# Patient Record
Sex: MALE | Race: WHITE | NOT HISPANIC OR LATINO | Employment: OTHER | URBAN - METROPOLITAN AREA
[De-identification: names, ages, dates, MRNs, and addresses within clinical notes are randomized per-mention and may not be internally consistent; named-entity substitution may affect disease eponyms.]

---

## 2017-01-16 ENCOUNTER — HOSPITAL ENCOUNTER (OUTPATIENT)
Dept: RADIOLOGY | Facility: HOSPITAL | Age: 62
Discharge: HOME | End: 2017-01-16
Attending: INTERNAL MEDICINE
Payer: COMMERCIAL

## 2017-01-16 ENCOUNTER — TELEPHONE (OUTPATIENT)
Dept: TRANSPLANT | Facility: CLINIC | Age: 62
End: 2017-01-16

## 2017-01-16 ENCOUNTER — OFFICE VISIT (OUTPATIENT)
Dept: TRANSPLANT | Facility: CLINIC | Age: 62
End: 2017-01-16
Payer: COMMERCIAL

## 2017-01-16 VITALS
HEIGHT: 67 IN | DIASTOLIC BLOOD PRESSURE: 73 MMHG | WEIGHT: 134.5 LBS | TEMPERATURE: 98 F | OXYGEN SATURATION: 100 % | HEART RATE: 52 BPM | BODY MASS INDEX: 21.11 KG/M2 | RESPIRATION RATE: 16 BRPM | SYSTOLIC BLOOD PRESSURE: 118 MMHG

## 2017-01-16 DIAGNOSIS — Z29.89 PROPHYLACTIC IMMUNOTHERAPY: Chronic | ICD-10-CM

## 2017-01-16 DIAGNOSIS — Z00.5 HEPATITIS B VIRUS INFECTION IN CADAVERIC DONOR: ICD-10-CM

## 2017-01-16 DIAGNOSIS — Z86.19 H/O HEPATITIS: ICD-10-CM

## 2017-01-16 DIAGNOSIS — B19.10 HEPATITIS B VIRUS INFECTION IN CADAVERIC DONOR: ICD-10-CM

## 2017-01-16 DIAGNOSIS — Z94.4 LIVER REPLACED BY TRANSPLANT: ICD-10-CM

## 2017-01-16 DIAGNOSIS — Z94.4 LIVER REPLACED BY TRANSPLANT: Primary | ICD-10-CM

## 2017-01-16 DIAGNOSIS — E87.5 HYPERKALEMIA: Primary | ICD-10-CM

## 2017-01-16 PROCEDURE — 71260 CT THORAX DX C+: CPT | Mod: 26,,, | Performed by: RADIOLOGY

## 2017-01-16 PROCEDURE — 99214 OFFICE O/P EST MOD 30 MIN: CPT | Mod: S$GLB,,, | Performed by: NURSE PRACTITIONER

## 2017-01-16 PROCEDURE — 99999 PR PBB SHADOW E&M-EST. PATIENT-LVL III: CPT | Mod: PBBFAC,,, | Performed by: NURSE PRACTITIONER

## 2017-01-16 PROCEDURE — 74177 CT ABD & PELVIS W/CONTRAST: CPT | Mod: 26,,, | Performed by: RADIOLOGY

## 2017-01-16 PROCEDURE — 97802 MEDICAL NUTRITION INDIV IN: CPT | Mod: S$GLB,,, | Performed by: DIETITIAN, REGISTERED

## 2017-01-16 PROCEDURE — 25500020 PHARM REV CODE 255: Performed by: INTERNAL MEDICINE

## 2017-01-16 PROCEDURE — 71260 CT THORAX DX C+: CPT | Mod: TC

## 2017-01-16 PROCEDURE — 74177 CT ABD & PELVIS W/CONTRAST: CPT | Mod: TC

## 2017-01-16 PROCEDURE — 1159F MED LIST DOCD IN RCRD: CPT | Mod: S$GLB,,, | Performed by: NURSE PRACTITIONER

## 2017-01-16 RX ADMIN — IOHEXOL 15 ML: 350 INJECTION, SOLUTION INTRAVENOUS at 11:01

## 2017-01-16 RX ADMIN — IOHEXOL 75 ML: 350 INJECTION, SOLUTION INTRAVENOUS at 01:01

## 2017-01-16 RX ADMIN — IOHEXOL 15 ML: 350 INJECTION, SOLUTION INTRAVENOUS at 10:01

## 2017-01-16 NOTE — TELEPHONE ENCOUNTER
Informed pt K=6.0 on today's labs with no visible hemolysis. Called in Kionex 30 gms po x 1 per Dr. Ball to [facility] pharmacy. Instructed to take dose today and will repeat potassium level tomorrow. Educated pt on low K diet. He verbalizes understanding.

## 2017-01-16 NOTE — PROGRESS NOTES
Transplant Hepatology  Liver Transplant Recipient Follow-up    Transplant Date: 2013  UNOS Native Liver Dx: Primary Liver Malignancy: Hepatoma (HCC) and Cirrhosis    Mario is here for follow up of his liver transplant.    ORGAN: LIVER  Whole or Partial: whole liver  Donor Type:  - brain death  CDC High Risk: no  Donor CMV Status: negative  Donor HCV Status: positive  Donor HBcAb: positive  Biliary Anastomosis: end to end  Arterial Anatomy: replaced left hepatic from left gastric  IVC reconstruction: end to end ivc  Portal vein status: patent    He has had the following complications since transplant: recurrent Hepatitis C. The noted complications is well controlled.    Subjective:     Interval History: Mario was last seen on 10/2015. Currently, he is doing well. Current complaints include none.  He is s/p 24 weeks of Harvoni w/ SVR 24 since at least 2016  He has good allograft function with normal LFTs, maintained on tacro and MMF  Has hx of skin ca, sees Derm regularly for skin checks  On Truvada for prophylaxis      Review of Systems   Constitutional: Negative for activity change, appetite change, chills, diaphoresis, fatigue, fever and unexpected weight change.   HENT: Negative for facial swelling.    Respiratory: Negative for cough, chest tightness and shortness of breath.    Cardiovascular: Negative for chest pain, palpitations and leg swelling.   Gastrointestinal: Negative for abdominal distention, abdominal pain, blood in stool, constipation, diarrhea, nausea and vomiting.   Musculoskeletal: Negative.  Negative for neck pain and neck stiffness.   Skin: Negative for color change, rash and wound.   Neurological: Negative for dizziness, tremors, weakness and light-headedness.   Hematological: Negative for adenopathy. Does not bruise/bleed easily.   Psychiatric/Behavioral: Negative for agitation and decreased concentration. The patient is not nervous/anxious.        Objective:     Physical  Exam   Constitutional: He is oriented to person, place, and time. He appears well-developed and well-nourished. No distress.   HENT:   Head: Normocephalic and atraumatic.   Eyes: Conjunctivae are normal. Pupils are equal, round, and reactive to light. No scleral icterus.   Neck: Normal range of motion. Neck supple.   Cardiovascular: Normal rate.    Pulmonary/Chest: Effort normal.   Abdominal: Soft. He exhibits no distension and no mass. There is no tenderness. There is no rebound and no guarding.   Musculoskeletal: Normal range of motion.   Neurological: He is alert and oriented to person, place, and time. No cranial nerve deficit. He exhibits normal muscle tone. Coordination normal.   No asterixis   Skin: Skin is warm and dry. No rash noted. He is not diaphoretic. No erythema.        Surgical incision healed well, no hernia appreciated   Psychiatric: He has a normal mood and affect. His behavior is normal. Judgment and thought content normal.     Lab Results   Component Value Date    BILITOT 0.8 01/16/2017    AST 22 01/16/2017    ALT 24 01/16/2017    ALKPHOS 70 01/16/2017    CREATININE 1.1 01/16/2017    ALBUMIN 3.9 01/16/2017     Lab Results   Component Value Date    WBC 4.59 01/16/2017    HGB 13.1 (L) 01/16/2017    HCT 37.2 (L) 01/16/2017    HCT 32 (L) 07/11/2013     (L) 01/16/2017     Lab Results   Component Value Date    TACROLIMUS 6.7 01/16/2017       Assessment/Plan:     1. Liver replaced by transplant    2. H/O hepatitis C    3. Prophylactic immunotherapy    4. Hepatitis B virus infection in cadaveric donor        S/P liver transplant due to HCV cirrhosis/HCC : Normal LFTs. Continue monitoring symptoms, labs and drug levels for drug-related toxicity and side effects  -continue with post transplant labs per protocol  -HCC screening per protocol, AFP normal, CT pending  IS: Chronic immunosuppressive medications for rejection prophylaxis at target.   no adjustment needed.   Maintenance:  -Instructed to  f/u with PCP for regular health maintenance care including cancer screenings and BMD  -Reviewed need to avoid sun exposure with use of sunblock, hats, long sleeves related to increased risk of skin cancers  -Recommend f/u with Dermatology for annual skin checks    RTC 1 year    SYEDA Navarro Patient Status  Functional Status: 100% - Normal, no complaints, no evidence of disease  Physical Capacity: No Limitations

## 2017-01-16 NOTE — LETTER
January 16, 2017        Neto Roblero  [address]  [facility] [address]  Phone: [phone]  Fax: [phone]             [facility]  [address]  [facility] [address]  Phone: [phone]   Patient: Mario Grier   MR Number: [id number]   YOB: 1955   Date of Visit: 1/16/2017       Dear Dr. Neto Roblero    Thank you for referring Mario Grier to me for evaluation. Attached you will find relevant portions of my assessment and plan of care.    If you have questions, please do not hesitate to call me. I look forward to following Mario Grier along with you.    Sincerely,    Ranjana Abel NP    Enclosure    If you would like to receive this communication electronically, please contact [email or web address] or [phone] to request [employer] access.    [employer] is a tool which provides read-only access to select patient information with whom you have a relationship. Its easy to use and provides real time access to review your patients record including encounter summaries, notes, results, and demographic information.    If you feel you have received this communication in error or would no longer like to receive these types of communications, please e-mail [email or web address]

## 2017-01-16 NOTE — PROGRESS NOTES
Met with pt today during post Ltx clinic.   Pt has Ltx 7/11/2013 h/o hep C,HCC,wegiht loss.  Pt present with caregiver today.   K 6.0 today. Pt not familiar with what the significance of elevated potassium level is.   He reports not eating many potatoes, but he is eating chocolate flavored cereal daily with banana. He also has chocolate flavored Slim Fast shakes.    Provided edu on limiting high potassium food intake.  Provided RD contact info.

## 2017-01-17 ENCOUNTER — TELEPHONE (OUTPATIENT)
Dept: TRANSPLANT | Facility: CLINIC | Age: 62
End: 2017-01-17

## 2017-01-17 ENCOUNTER — HOSPITAL ENCOUNTER (EMERGENCY)
Facility: HOSPITAL | Age: 62
Discharge: HOME | End: 2017-01-18
Attending: FAMILY MEDICINE
Payer: COMMERCIAL

## 2017-01-17 VITALS
DIASTOLIC BLOOD PRESSURE: 67 MMHG | BODY MASS INDEX: 20.88 KG/M2 | TEMPERATURE: 100 F | HEIGHT: 67 IN | RESPIRATION RATE: 20 BRPM | SYSTOLIC BLOOD PRESSURE: 134 MMHG | HEART RATE: 92 BPM | OXYGEN SATURATION: 100 % | WEIGHT: 133 LBS

## 2017-01-17 DIAGNOSIS — K52.9 ACUTE GASTROENTERITIS: Primary | ICD-10-CM

## 2017-01-17 LAB
ALBUMIN SERPL BCP-MCNC: 4.6 G/DL
ALP SERPL-CCNC: 75 U/L
ALT SERPL W/O P-5'-P-CCNC: 22 U/L
ANION GAP SERPL CALC-SCNC: 11 MMOL/L
AST SERPL-CCNC: 22 U/L
BASOPHILS # BLD AUTO: 0 K/UL
BASOPHILS NFR BLD: 0 %
BILIRUB SERPL-MCNC: 1.6 MG/DL
BUN SERPL-MCNC: 25 MG/DL
CALCIUM SERPL-MCNC: 9.9 MG/DL
CHLORIDE SERPL-SCNC: 106 MMOL/L
CO2 SERPL-SCNC: 24 MMOL/L
CREAT SERPL-MCNC: 1.1 MG/DL
DIFFERENTIAL METHOD: ABNORMAL
EOSINOPHIL # BLD AUTO: 0 K/UL
EOSINOPHIL NFR BLD: 0 %
ERYTHROCYTE [DISTWIDTH] IN BLOOD BY AUTOMATED COUNT: 12.1 %
EST. GFR  (AFRICAN AMERICAN): >60 ML/MIN/1.73 M^2
EST. GFR  (NON AFRICAN AMERICAN): >60 ML/MIN/1.73 M^2
GLUCOSE SERPL-MCNC: 126 MG/DL
HCT VFR BLD AUTO: 40.2 %
HGB BLD-MCNC: 14.8 G/DL
LIPASE SERPL-CCNC: 10 U/L
LYMPHOCYTES # BLD AUTO: 0.9 K/UL
LYMPHOCYTES NFR BLD: 6.8 %
MCH RBC QN AUTO: 34.5 PG
MCHC RBC AUTO-ENTMCNC: 36.8 %
MCV RBC AUTO: 94 FL
MONOCYTES # BLD AUTO: 0.5 K/UL
MONOCYTES NFR BLD: 3.9 %
NEUTROPHILS # BLD AUTO: 11.1 K/UL
NEUTROPHILS NFR BLD: 89.1 %
PLATELET # BLD AUTO: 136 K/UL
PLATELET BLD QL SMEAR: ABNORMAL
PMV BLD AUTO: 12 FL
POTASSIUM SERPL-SCNC: 3.7 MMOL/L
PROT SERPL-MCNC: 8 G/DL
RBC # BLD AUTO: 4.29 M/UL
SODIUM SERPL-SCNC: 141 MMOL/L
WBC # BLD AUTO: 12.49 K/UL

## 2017-01-17 PROCEDURE — 96365 THER/PROPH/DIAG IV INF INIT: CPT

## 2017-01-17 PROCEDURE — 25000003 PHARM REV CODE 250: Performed by: EMERGENCY MEDICINE

## 2017-01-17 PROCEDURE — 96361 HYDRATE IV INFUSION ADD-ON: CPT

## 2017-01-17 PROCEDURE — 99284 EMERGENCY DEPT VISIT MOD MDM: CPT | Mod: 25

## 2017-01-17 PROCEDURE — 93005 ELECTROCARDIOGRAM TRACING: CPT

## 2017-01-17 PROCEDURE — 63600175 PHARM REV CODE 636 W HCPCS

## 2017-01-17 PROCEDURE — 83690 ASSAY OF LIPASE: CPT

## 2017-01-17 PROCEDURE — 96375 TX/PRO/DX INJ NEW DRUG ADDON: CPT

## 2017-01-17 PROCEDURE — 85025 COMPLETE CBC W/AUTO DIFF WBC: CPT

## 2017-01-17 PROCEDURE — 63600175 PHARM REV CODE 636 W HCPCS: Performed by: EMERGENCY MEDICINE

## 2017-01-17 PROCEDURE — 25000003 PHARM REV CODE 250

## 2017-01-17 PROCEDURE — 93010 ELECTROCARDIOGRAM REPORT: CPT | Mod: ,,, | Performed by: INTERNAL MEDICINE

## 2017-01-17 PROCEDURE — 99285 EMERGENCY DEPT VISIT HI MDM: CPT | Mod: ,,, | Performed by: EMERGENCY MEDICINE

## 2017-01-17 PROCEDURE — 80053 COMPREHEN METABOLIC PANEL: CPT

## 2017-01-17 RX ORDER — PROMETHAZINE HYDROCHLORIDE 25 MG/1
25 SUPPOSITORY RECTAL EVERY 6 HOURS PRN
Qty: 15 SUPPOSITORY | Refills: 0 | OUTPATIENT
Start: 2017-01-17 | End: 2019-03-25

## 2017-01-17 RX ORDER — MORPHINE SULFATE 2 MG/ML
6 INJECTION, SOLUTION INTRAMUSCULAR; INTRAVENOUS
Status: COMPLETED | OUTPATIENT
Start: 2017-01-17 | End: 2017-01-17

## 2017-01-17 RX ORDER — SODIUM CHLORIDE 9 MG/ML
1000 INJECTION, SOLUTION INTRAVENOUS
Status: COMPLETED | OUTPATIENT
Start: 2017-01-17 | End: 2017-01-17

## 2017-01-17 RX ORDER — ONDANSETRON 2 MG/ML
4 INJECTION INTRAMUSCULAR; INTRAVENOUS
Status: COMPLETED | OUTPATIENT
Start: 2017-01-17 | End: 2017-01-17

## 2017-01-17 RX ORDER — TRAMADOL HYDROCHLORIDE 50 MG/1
50 TABLET ORAL
Status: COMPLETED | OUTPATIENT
Start: 2017-01-17 | End: 2017-01-17

## 2017-01-17 RX ADMIN — SODIUM CHLORIDE 1000 ML: 0.9 INJECTION, SOLUTION INTRAVENOUS at 09:01

## 2017-01-17 RX ADMIN — SODIUM CHLORIDE 500 ML: 0.9 INJECTION, SOLUTION INTRAVENOUS at 11:01

## 2017-01-17 RX ADMIN — SODIUM CHLORIDE 1000 ML: 0.9 INJECTION, SOLUTION INTRAVENOUS at 08:01

## 2017-01-17 RX ADMIN — TRAMADOL HYDROCHLORIDE 50 MG: 50 TABLET, COATED ORAL at 08:01

## 2017-01-17 RX ADMIN — PROMETHAZINE HYDROCHLORIDE 12.5 MG: 25 INJECTION INTRAMUSCULAR; INTRAVENOUS at 09:01

## 2017-01-17 RX ADMIN — MORPHINE SULFATE 6 MG: 2 INJECTION, SOLUTION INTRAMUSCULAR; INTRAVENOUS at 11:01

## 2017-01-17 RX ADMIN — ONDANSETRON 4 MG: 2 INJECTION INTRAMUSCULAR; INTRAVENOUS at 08:01

## 2017-01-17 NOTE — TELEPHONE ENCOUNTER
----- Message from Cele Vizcarra sent at 1/17/2017  2:53 PM CST -----  Contact: Lillian  ..Mario Grier is returning call, [phone]

## 2017-01-17 NOTE — ED AVS SNAPSHOT
[facility]  [address]  [place] [address]               Mario Grier   2017  8:19 PM   ED    Description:  Male : 1955   Department:  [facility]           Your Care was Coordinated By:     Provider Role From To    Carlos Comer MD Attending Provider 17    Mita Pepe MD Attending Provider 17 --    Jessica Garber PA-C Physician Assistant 17      Reason for Visit     Abnormal lab           Diagnoses this Visit        Comments    Acute gastroenteritis    -  Primary       ED Disposition     ED Disposition Condition Comment    Discharge             To Do List           Follow-up Information     Follow up with Ranjana Abel NP. Schedule an appointment as soon as possible for a visit in 1 week.    Specialty:  Transplant    Contact information:    [address]  [place] [address]  [phone]        [facility]     [facility] -  Assistance  Registered nurses in the [facility] provide clinical advisement, health education, appointment booking, and other advisory services.  Call for this free service at [phone].             Medications           Message regarding Medications     Verify the changes and/or additions to your medication regime listed below are the same as discussed with your clinician today.  If any of these changes or additions are incorrect, please notify your healthcare provider.        These medications were administered today        Dose Freq    0.9%  NaCl infusion 1,000 mL ED 1 Time    Sig: Inject 1,000 mLs into the vein ED 1 Time.    Class: Normal    Route: Intravenous    Cosign for Ordering: Accepted by Carlos Comer MD on 2017  9:35 PM    ondansetron injection 4 mg 4 mg ED 1 Time    Sig: Inject 4 mg into the vein ED 1 Time.    Class: Normal    Route: Intravenous    Cosign for  "Ordering: Accepted by Carlos Comer MD on 1/17/2017  9:35 PM    tramadol tablet 50 mg 50 mg ED 1 Time    Sig: Take 1 tablet (50 mg total) by mouth ED 1 Time.    Class: Normal    Route: Oral    Cosign for Ordering: Accepted by Carlos Comer MD on 1/17/2017  9:35 PM    promethazine (PHENERGAN) 12.5 mg in dextrose 5 % 50 mL IVPB 12.5 mg ED 1 Time    Sig: Inject 12.5 mg into the vein ED 1 Time.    Class: Normal    Route: Intravenous    Cosign for Ordering: Required by Carlos Comer MD    0.9%  NaCl infusion 1,000 mL ED 1 Time    Sig: Inject 1,000 mLs into the vein ED 1 Time.    Class: Normal    Route: Intravenous    Cosign for Ordering: Required by Carlos Comer MD    morphine injection 6 mg 6 mg ED 1 Time    Sig: Inject 3 mLs (6 mg total) into the vein ED 1 Time.    Class: Normal    Route: Intravenous    sodium chloride 0.9% bolus 500 mL 500 mL ED 1 Time    Sig: Inject 500 mLs into the vein ED 1 Time.    Class: Normal    Route: Intravenous           Verify that the below list of medications is an accurate representation of the medications you are currently taking.  If none reported, the list may be blank. If incorrect, please contact your healthcare provider. Carry this list with you in case of emergency.           Current Medications     diazepam (VALIUM) 10 MG Tab Take 10 mg by mouth nightly as needed.     multivitamin capsule Take 1 capsule by mouth once daily.    mycophenolate (CELLCEPT) 250 mg Cap Take 2 capsules (500 mg total) by mouth 2 (two) times daily.    oxycodone 20 mg Tab Take 1 tablet by mouth 2 (two) times daily as needed.     tacrolimus (PROGRAF) 1 MG Cap 2 mg in am and 1 mg in pm    TRUVADA 200-300 mg Tab TAKE 1 TABLET DAILY           Clinical Reference Information           Your Vitals Were     BP Pulse Temp Resp Height Weight    134/67 92 99.6 °F (37.6 °C) (Oral) 20 5' 7" (1.702 m) 60.3 kg (133 lb)    SpO2 BMI             100% 20.83 kg/m2         Allergies as of 1/17/2017        " Reactions    Penicillins     Ciprofloxacin Rash      Immunizations Administered on Date of Encounter - 1/17/2017     None      ED Micro, Lab, POCT     Start Ordered       Status Ordering Provider    01/17/17 2034 01/17/17 2034  CBC auto differential  STAT      Final result     01/17/17 2034 01/17/17 2034  Comprehensive metabolic panel  STAT      Final result     01/17/17 2034 01/17/17 2034  Lipase  STAT      Final result     01/17/17 2034 01/17/17 2034    STAT,   Status:  Canceled      Canceled       ED Imaging Orders     None      Discharge References/Attachments     GASTROENTERITIS, NONINFECTIOUS (ENGLISH)      Smoking Cessation     If you would like to quit smoking:   You may be eligible for free services if you are a [place] resident and started smoking cigarettes before September 1, 1988.  Call the Smoking Cessation Trust ([facility]) toll free at [phone] or [phone].   Call 9-058-QUIT-NOW if you do not meet the above criteria.             [facility] complies with applicable Federal civil rights laws and does not discriminate on the basis of race, color, national origin, age, disability, or sex.        Language Assistance Services     ATTENTION: Language assistance services are available, free of charge. Please call [phone].      ATENCIÓN: Si habla español, tiene a rangel disposición servicios gratuitos de asistencia lingüística. Llame al [phone].     CHÚ Ý: N?u b?n nói Ti?ng Vi?t, có các d?ch v? h? tr? ngôn ng? mi?n phí dành cho b?n. G?i s? [phone].

## 2017-01-17 NOTE — TELEPHONE ENCOUNTER
Spoke to patient who states he took Kionex last night and had multiple bowel movements but this morning has woken up nauseated. He states he has thrown up twice and is not feeling well. Advised pt if symptoms do not start to improve would go to local Urgent Care for treatment and potassium recheck. He verbalizes understanding and will call with update.

## 2017-01-17 NOTE — TELEPHONE ENCOUNTER
Spoke to pt's girlfriend Lillian to follow up on pt's status which she said has not improved. She states pt has not stopped vomiting and is not able to keep down any water. Advised that she take pt to urgent care or ER asap to be evaluated. She verbalizes understanding.

## 2017-01-17 NOTE — TELEPHONE ENCOUNTER
----- Message from Cele Vizcarra sent at 1/17/2017 10:23 AM CST -----  Contact: Lillian Saleem was prescribed Kionex 30 gms po x 1 on yesterday and is very sick (constant throwing up and fatigue)  [phone]

## 2017-01-18 ENCOUNTER — TELEPHONE (OUTPATIENT)
Dept: TRANSPLANT | Facility: CLINIC | Age: 62
End: 2017-01-18

## 2017-01-18 NOTE — ED NOTES
Patient identifiers verified and correct for Mario Grier.    LOC: The patient is awake, alert and aware of environment with an appropriate affect, the patient is oriented x 3 and speaking appropriately.  APPEARANCE: Patient resting comfortably and in no acute distress, patient is clean and well groomed, patient's clothing is properly fastened.  SKIN: The skin is warm and dry, color consistent with ethnicity, patient has normal skin turgor and moist mucus membranes, skin intact, no breakdown or bruising noted.  MUSCULOSKELETAL: Patient moving all extremities spontaneously, no obvious swelling or deformities noted.  RESPIRATORY: Airway is open and patent, respirations are spontaneous, patient has a normal effort and rate, no accessory muscle use noted, bilateral breath sounds even and clear.  CARDIAC: Patient has a normal rate and regular rhythm, no periphreal edema noted, capillary refill < 3 seconds.  ABDOMEN: Soft and non tender to palpation, no distention noted, normoactive bowel sounds present in all four quadrants. Pt reports nausea and vomiting  NEUROLOGIC: Pupils equal bilaterally, eyes open spontaneously, behavior appropriate to situation, follows commands, facial expression symmetrical, bilateral hand grasp equal and even, purposeful motor response noted, normal sensation in all extremities when touched with a finger.
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
## 2017-01-18 NOTE — ED NOTES
Pt/wife requesting pain med, and states his whole body and joints hurt. Pa informed of pt's request, and states Dr. Pepe will come, and assess pt before pain med is ordered.
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
## 2017-01-18 NOTE — TELEPHONE ENCOUNTER
----- Message from Jim Ball MD sent at 1/17/2017  8:47 AM CST -----  Results reviewed. Please CT stable.

## 2017-01-18 NOTE — TELEPHONE ENCOUNTER
Informed pt labs and CT scans reviewed are stable, no med changes needed. Will repeat labs in 3 months and CT scans in 1 year. Letter sent.

## 2017-01-18 NOTE — PROVIDER PROGRESS NOTES - EMERGENCY DEPT.
Encounter Date: 1/17/2017    ED Physician Progress Notes       SCRIBE NOTE: I, Graham Goins, am scribing for, and in the presence of,  Dr. Minor.  Physician Statement: I, Dr. Minor, personally performed the services described in this documentation as scribed by Graham Goins in my presence, and it is both accurate and complete.      EKG - STEMI Decision  Initial Reading: No STEMI present.

## 2017-01-30 RX ORDER — EMTRICITABINE AND TENOFOVIR DISOPROXIL FUMARATE 200; 300 MG/1; MG/1
1 TABLET, FILM COATED ORAL DAILY
Qty: 30 TABLET | Refills: 11 | Status: SHIPPED | OUTPATIENT
Start: 2017-01-30 | End: 2017-02-01 | Stop reason: SDUPTHER

## 2017-01-30 NOTE — TELEPHONE ENCOUNTER
----- Message from Gaby Lopez sent at 1/30/2017 10:38 AM CST -----  Contact: pt  Calling to get med refill for TRUVADA 200-300 mg Tab please call pt @ # [phone].

## 2017-02-01 RX ORDER — EMTRICITABINE AND TENOFOVIR DISOPROXIL FUMARATE 200; 300 MG/1; MG/1
TABLET, FILM COATED ORAL
Qty: 30 TABLET | Refills: 0 | Status: SHIPPED | OUTPATIENT
Start: 2017-02-01 | End: 2018-01-24 | Stop reason: SDUPTHER

## 2017-03-16 ENCOUNTER — PATIENT MESSAGE (OUTPATIENT)
Dept: TRANSPLANT | Facility: CLINIC | Age: 62
End: 2017-03-16

## 2017-03-17 NOTE — TELEPHONE ENCOUNTER
RAMU returned call to patient this morning, no answer, RAMU left  for return call about insurance loss and prescription assistance.   RAMU replied to patient via My Chart and sent an email to patient with needed documents to be completed.   SW asking for a return phone call as well to discuss prescription assistance for Prograf and Cellcept through transplant, information for [facility] assistance, and information for [facility] Insurance repJoseluis.    RAMU wants to address loss of coverage and needed continued follow up care.    RAMU remains available.

## 2017-04-10 ENCOUNTER — PATIENT MESSAGE (OUTPATIENT)
Dept: TRANSPLANT | Facility: CLINIC | Age: 62
End: 2017-04-10

## 2017-04-11 ENCOUNTER — PATIENT MESSAGE (OUTPATIENT)
Dept: TRANSPLANT | Facility: CLINIC | Age: 62
End: 2017-04-11

## 2017-04-25 ENCOUNTER — TELEPHONE (OUTPATIENT)
Dept: TRANSPLANT | Facility: CLINIC | Age: 62
End: 2017-04-25

## 2017-04-25 NOTE — TELEPHONE ENCOUNTER
----- Message from Cele Vizcarra sent at 4/25/2017 11:07 AM CDT -----  Contact: Alfred Hatch  Calling to get a DX code for pts TRUVADA 200-300 mg Tab, please call    
Diagnosis code provided to pharmacy  
07:30

## 2017-05-15 ENCOUNTER — LAB VISIT (OUTPATIENT)
Dept: LAB | Facility: HOSPITAL | Age: 62
End: 2017-05-15
Attending: INTERNAL MEDICINE
Payer: MEDICAID

## 2017-05-15 DIAGNOSIS — Z94.4 LIVER REPLACED BY TRANSPLANT: ICD-10-CM

## 2017-05-15 LAB
ALBUMIN SERPL BCP-MCNC: 3.8 G/DL
ALP SERPL-CCNC: 83 U/L
ALT SERPL W/O P-5'-P-CCNC: 37 U/L
ANION GAP SERPL CALC-SCNC: 8 MMOL/L
AST SERPL-CCNC: 38 U/L
BASOPHILS # BLD AUTO: 0.01 K/UL
BASOPHILS NFR BLD: 0.2 %
BILIRUB SERPL-MCNC: 0.7 MG/DL
BUN SERPL-MCNC: 23 MG/DL
CALCIUM SERPL-MCNC: 9.3 MG/DL
CHLORIDE SERPL-SCNC: 106 MMOL/L
CO2 SERPL-SCNC: 26 MMOL/L
CREAT SERPL-MCNC: 1.4 MG/DL
DIFFERENTIAL METHOD: ABNORMAL
EOSINOPHIL # BLD AUTO: 0.2 K/UL
EOSINOPHIL NFR BLD: 3.1 %
ERYTHROCYTE [DISTWIDTH] IN BLOOD BY AUTOMATED COUNT: 12 %
EST. GFR  (AFRICAN AMERICAN): >60 ML/MIN/1.73 M^2
EST. GFR  (NON AFRICAN AMERICAN): 54 ML/MIN/1.73 M^2
GGT SERPL-CCNC: 199 U/L
GLUCOSE SERPL-MCNC: 100 MG/DL
HCT VFR BLD AUTO: 37.2 %
HGB BLD-MCNC: 13 G/DL
LYMPHOCYTES # BLD AUTO: 1 K/UL
LYMPHOCYTES NFR BLD: 20 %
MCH RBC QN AUTO: 34.2 PG
MCHC RBC AUTO-ENTMCNC: 34.9 %
MCV RBC AUTO: 98 FL
MONOCYTES # BLD AUTO: 0.4 K/UL
MONOCYTES NFR BLD: 7.6 %
NEUTROPHILS # BLD AUTO: 3.6 K/UL
NEUTROPHILS NFR BLD: 69.1 %
PLATELET # BLD AUTO: 113 K/UL
PMV BLD AUTO: 12.4 FL
POTASSIUM SERPL-SCNC: 4.7 MMOL/L
PROT SERPL-MCNC: 7.3 G/DL
RBC # BLD AUTO: 3.8 M/UL
SODIUM SERPL-SCNC: 140 MMOL/L
WBC # BLD AUTO: 5.15 K/UL

## 2017-05-15 PROCEDURE — 80197 ASSAY OF TACROLIMUS: CPT

## 2017-05-15 PROCEDURE — 80053 COMPREHEN METABOLIC PANEL: CPT

## 2017-05-15 PROCEDURE — 87340 HEPATITIS B SURFACE AG IA: CPT

## 2017-05-15 PROCEDURE — 87517 HEPATITIS B DNA QUANT: CPT

## 2017-05-15 PROCEDURE — 85025 COMPLETE CBC W/AUTO DIFF WBC: CPT

## 2017-05-15 PROCEDURE — 82977 ASSAY OF GGT: CPT

## 2017-05-15 PROCEDURE — 36415 COLL VENOUS BLD VENIPUNCTURE: CPT

## 2017-05-16 LAB
HBV SURFACE AG SERPL QL IA: NEGATIVE
TACROLIMUS BLD-MCNC: 6.2 NG/ML

## 2017-05-17 ENCOUNTER — TELEPHONE (OUTPATIENT)
Dept: TRANSPLANT | Facility: CLINIC | Age: 62
End: 2017-05-17

## 2017-05-17 NOTE — TELEPHONE ENCOUNTER
----- Message from Jim Ball MD sent at 5/17/2017  7:46 AM CDT -----  Results reviewed. Please advise labs are stable.

## 2017-05-18 LAB
HEPATITIS B VIRAL DNA - QUANTITATIVE: <10 IU/ML
HEPATITIS B VIRUS DNA: NOT DETECTED
LOG HBV IU/ML: <1 LOG (10) IU/ML

## 2017-08-14 ENCOUNTER — LAB VISIT (OUTPATIENT)
Dept: LAB | Facility: HOSPITAL | Age: 62
End: 2017-08-14
Attending: INTERNAL MEDICINE
Payer: MEDICAID

## 2017-08-14 DIAGNOSIS — Z94.4 LIVER REPLACED BY TRANSPLANT: ICD-10-CM

## 2017-08-14 LAB
ALBUMIN SERPL BCP-MCNC: 3.7 G/DL
ALP SERPL-CCNC: 80 U/L
ALT SERPL W/O P-5'-P-CCNC: 11 U/L
ANION GAP SERPL CALC-SCNC: 7 MMOL/L
AST SERPL-CCNC: 19 U/L
BASOPHILS # BLD AUTO: 0.01 K/UL
BASOPHILS NFR BLD: 0.2 %
BILIRUB SERPL-MCNC: 0.8 MG/DL
BUN SERPL-MCNC: 25 MG/DL
CALCIUM SERPL-MCNC: 9.4 MG/DL
CHLORIDE SERPL-SCNC: 104 MMOL/L
CO2 SERPL-SCNC: 27 MMOL/L
CREAT SERPL-MCNC: 1.7 MG/DL
DIFFERENTIAL METHOD: ABNORMAL
EOSINOPHIL # BLD AUTO: 0.2 K/UL
EOSINOPHIL NFR BLD: 3.7 %
ERYTHROCYTE [DISTWIDTH] IN BLOOD BY AUTOMATED COUNT: 12 %
EST. GFR  (AFRICAN AMERICAN): 49 ML/MIN/1.73 M^2
EST. GFR  (NON AFRICAN AMERICAN): 43 ML/MIN/1.73 M^2
GGT SERPL-CCNC: 126 U/L
GLUCOSE SERPL-MCNC: 96 MG/DL
HCT VFR BLD AUTO: 35.7 %
HGB BLD-MCNC: 12.3 G/DL
LYMPHOCYTES # BLD AUTO: 1.2 K/UL
LYMPHOCYTES NFR BLD: 27 %
MCH RBC QN AUTO: 34.6 PG
MCHC RBC AUTO-ENTMCNC: 34.5 G/DL
MCV RBC AUTO: 101 FL
MONOCYTES # BLD AUTO: 0.4 K/UL
MONOCYTES NFR BLD: 8.5 %
NEUTROPHILS # BLD AUTO: 2.7 K/UL
NEUTROPHILS NFR BLD: 60.6 %
PLATELET # BLD AUTO: 114 K/UL
PMV BLD AUTO: 11.7 FL
POTASSIUM SERPL-SCNC: 5.1 MMOL/L
PROT SERPL-MCNC: 7 G/DL
RBC # BLD AUTO: 3.55 M/UL
SODIUM SERPL-SCNC: 138 MMOL/L
TACROLIMUS BLD-MCNC: 9.1 NG/ML
WBC # BLD AUTO: 4.37 K/UL

## 2017-08-14 PROCEDURE — 36415 COLL VENOUS BLD VENIPUNCTURE: CPT

## 2017-08-14 PROCEDURE — 87517 HEPATITIS B DNA QUANT: CPT

## 2017-08-14 PROCEDURE — 85025 COMPLETE CBC W/AUTO DIFF WBC: CPT

## 2017-08-14 PROCEDURE — 87340 HEPATITIS B SURFACE AG IA: CPT

## 2017-08-14 PROCEDURE — 80053 COMPREHEN METABOLIC PANEL: CPT

## 2017-08-14 PROCEDURE — 82977 ASSAY OF GGT: CPT

## 2017-08-14 PROCEDURE — 80197 ASSAY OF TACROLIMUS: CPT

## 2017-08-15 LAB — HBV SURFACE AG SERPL QL IA: NEGATIVE

## 2017-08-15 NOTE — TELEPHONE ENCOUNTER
Informed pt labs reviewed, instructed pt to decrease prograf to 1 mg BID. Will repeat labs Monday 8/21.

## 2017-08-16 RX ORDER — TACROLIMUS 1 MG/1
1 CAPSULE ORAL EVERY 12 HOURS
Qty: 90 CAPSULE | Refills: 11 | Status: SHIPPED | OUTPATIENT
Start: 2017-08-16 | End: 2018-08-15 | Stop reason: SDUPTHER

## 2017-08-30 ENCOUNTER — LAB VISIT (OUTPATIENT)
Dept: LAB | Facility: HOSPITAL | Age: 62
End: 2017-08-30
Attending: INTERNAL MEDICINE
Payer: MEDICAID

## 2017-08-30 DIAGNOSIS — Z94.4 LIVER REPLACED BY TRANSPLANT: ICD-10-CM

## 2017-08-30 LAB
ALBUMIN SERPL BCP-MCNC: 3.8 G/DL
ALP SERPL-CCNC: 82 U/L
ALT SERPL W/O P-5'-P-CCNC: 24 U/L
ANION GAP SERPL CALC-SCNC: 5 MMOL/L
AST SERPL-CCNC: 34 U/L
BASOPHILS # BLD AUTO: 0.01 K/UL
BASOPHILS NFR BLD: 0.3 %
BILIRUB SERPL-MCNC: 1 MG/DL
BUN SERPL-MCNC: 14 MG/DL
CALCIUM SERPL-MCNC: 9.4 MG/DL
CHLORIDE SERPL-SCNC: 105 MMOL/L
CO2 SERPL-SCNC: 29 MMOL/L
CREAT SERPL-MCNC: 1.3 MG/DL
DIFFERENTIAL METHOD: ABNORMAL
EOSINOPHIL # BLD AUTO: 0.1 K/UL
EOSINOPHIL NFR BLD: 3.3 %
ERYTHROCYTE [DISTWIDTH] IN BLOOD BY AUTOMATED COUNT: 11.9 %
EST. GFR  (AFRICAN AMERICAN): >60 ML/MIN/1.73 M^2
EST. GFR  (NON AFRICAN AMERICAN): 59 ML/MIN/1.73 M^2
GGT SERPL-CCNC: 114 U/L
GLUCOSE SERPL-MCNC: 99 MG/DL
HCT VFR BLD AUTO: 33.8 %
HGB BLD-MCNC: 11.6 G/DL
LYMPHOCYTES # BLD AUTO: 0.8 K/UL
LYMPHOCYTES NFR BLD: 19.8 %
MCH RBC QN AUTO: 34.6 PG
MCHC RBC AUTO-ENTMCNC: 34.3 G/DL
MCV RBC AUTO: 101 FL
MONOCYTES # BLD AUTO: 0.4 K/UL
MONOCYTES NFR BLD: 11.1 %
NEUTROPHILS # BLD AUTO: 2.6 K/UL
NEUTROPHILS NFR BLD: 65.5 %
PLATELET # BLD AUTO: 114 K/UL
PMV BLD AUTO: 10.9 FL
POTASSIUM SERPL-SCNC: 4.5 MMOL/L
PROT SERPL-MCNC: 7.1 G/DL
RBC # BLD AUTO: 3.35 M/UL
SODIUM SERPL-SCNC: 139 MMOL/L
WBC # BLD AUTO: 3.98 K/UL

## 2017-08-30 PROCEDURE — 80197 ASSAY OF TACROLIMUS: CPT

## 2017-08-30 PROCEDURE — 80053 COMPREHEN METABOLIC PANEL: CPT

## 2017-08-30 PROCEDURE — 85025 COMPLETE CBC W/AUTO DIFF WBC: CPT

## 2017-08-30 PROCEDURE — 82977 ASSAY OF GGT: CPT

## 2017-08-30 PROCEDURE — 36415 COLL VENOUS BLD VENIPUNCTURE: CPT

## 2017-08-31 LAB — TACROLIMUS BLD-MCNC: 5.5 NG/ML

## 2017-09-01 ENCOUNTER — TELEPHONE (OUTPATIENT)
Dept: TRANSPLANT | Facility: CLINIC | Age: 62
End: 2017-09-01

## 2017-09-01 DIAGNOSIS — Z94.4 LIVER REPLACED BY TRANSPLANT: Primary | ICD-10-CM

## 2017-09-01 NOTE — TELEPHONE ENCOUNTER
----- Message from Jim Ball MD sent at 9/1/2017  8:25 AM CDT -----  Results reviewed. Please advise labs are stable.

## 2017-09-01 NOTE — TELEPHONE ENCOUNTER
Labs reviewed by Dr. Ball  Continue routine labs no changes needed.  Letter sent for next lab appointment 11/13/17

## 2017-09-18 RX ORDER — MYCOPHENOLATE MOFETIL 250 MG/1
CAPSULE ORAL
Qty: 120 CAPSULE | Refills: 10 | Status: SHIPPED | OUTPATIENT
Start: 2017-09-18 | End: 2018-08-15 | Stop reason: SDUPTHER

## 2017-11-15 ENCOUNTER — LAB VISIT (OUTPATIENT)
Dept: LAB | Facility: HOSPITAL | Age: 62
End: 2017-11-15
Attending: INTERNAL MEDICINE
Payer: MEDICAID

## 2017-11-15 DIAGNOSIS — Z94.4 LIVER REPLACED BY TRANSPLANT: ICD-10-CM

## 2017-11-15 LAB
ALBUMIN SERPL BCP-MCNC: 3.5 G/DL
ALP SERPL-CCNC: 61 U/L
ALT SERPL W/O P-5'-P-CCNC: 14 U/L
ANION GAP SERPL CALC-SCNC: 4 MMOL/L
AST SERPL-CCNC: 22 U/L
BASOPHILS # BLD AUTO: 0.01 K/UL
BASOPHILS NFR BLD: 0.2 %
BILIRUB SERPL-MCNC: 0.4 MG/DL
BUN SERPL-MCNC: 22 MG/DL
CALCIUM SERPL-MCNC: 8.7 MG/DL
CHLORIDE SERPL-SCNC: 105 MMOL/L
CO2 SERPL-SCNC: 30 MMOL/L
CREAT SERPL-MCNC: 1.4 MG/DL
DIFFERENTIAL METHOD: ABNORMAL
EOSINOPHIL # BLD AUTO: 0.2 K/UL
EOSINOPHIL NFR BLD: 3.6 %
ERYTHROCYTE [DISTWIDTH] IN BLOOD BY AUTOMATED COUNT: 11.7 %
EST. GFR  (AFRICAN AMERICAN): >60 ML/MIN/1.73 M^2
EST. GFR  (NON AFRICAN AMERICAN): 54 ML/MIN/1.73 M^2
GLUCOSE SERPL-MCNC: 92 MG/DL
HCT VFR BLD AUTO: 33.8 %
HGB BLD-MCNC: 11.1 G/DL
LYMPHOCYTES # BLD AUTO: 1.1 K/UL
LYMPHOCYTES NFR BLD: 24.2 %
MCH RBC QN AUTO: 33.8 PG
MCHC RBC AUTO-ENTMCNC: 32.8 G/DL
MCV RBC AUTO: 103 FL
MONOCYTES # BLD AUTO: 0.5 K/UL
MONOCYTES NFR BLD: 10.8 %
NEUTROPHILS # BLD AUTO: 2.9 K/UL
NEUTROPHILS NFR BLD: 61.2 %
PLATELET # BLD AUTO: 103 K/UL
PMV BLD AUTO: 12 FL
POTASSIUM SERPL-SCNC: 4.4 MMOL/L
PROT SERPL-MCNC: 6.5 G/DL
RBC # BLD AUTO: 3.28 M/UL
SODIUM SERPL-SCNC: 139 MMOL/L
TACROLIMUS BLD-MCNC: 5.3 NG/ML
WBC # BLD AUTO: 4.72 K/UL

## 2017-11-15 PROCEDURE — 80197 ASSAY OF TACROLIMUS: CPT

## 2017-11-15 PROCEDURE — 36415 COLL VENOUS BLD VENIPUNCTURE: CPT

## 2017-11-15 PROCEDURE — 85025 COMPLETE CBC W/AUTO DIFF WBC: CPT

## 2017-11-15 PROCEDURE — 80053 COMPREHEN METABOLIC PANEL: CPT

## 2017-11-16 ENCOUNTER — TELEPHONE (OUTPATIENT)
Dept: TRANSPLANT | Facility: CLINIC | Age: 62
End: 2017-11-16

## 2017-11-16 DIAGNOSIS — Z94.4 LIVER REPLACED BY TRANSPLANT: Primary | ICD-10-CM

## 2017-11-16 NOTE — TELEPHONE ENCOUNTER
----- Message from Jim Ball MD sent at 11/16/2017  1:12 PM CST -----  Results reviewed. Please advise labs are stable.

## 2017-12-08 ENCOUNTER — PATIENT MESSAGE (OUTPATIENT)
Dept: TRANSPLANT | Facility: CLINIC | Age: 62
End: 2017-12-08

## 2017-12-08 DIAGNOSIS — Z79.52 LONG TERM CURRENT USE OF SYSTEMIC STEROIDS: ICD-10-CM

## 2017-12-08 DIAGNOSIS — Z94.4 LIVER REPLACED BY TRANSPLANT: Primary | ICD-10-CM

## 2017-12-08 DIAGNOSIS — Z79.899 HIGH RISK MEDICATION USE: ICD-10-CM

## 2017-12-21 ENCOUNTER — PATIENT MESSAGE (OUTPATIENT)
Dept: TRANSPLANT | Facility: CLINIC | Age: 62
End: 2017-12-21

## 2017-12-27 ENCOUNTER — NURSE TRIAGE (OUTPATIENT)
Dept: ADMINISTRATIVE | Facility: CLINIC | Age: 62
End: 2017-12-27
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
## 2018-01-24 RX ORDER — EMTRICITABINE AND TENOFOVIR DISOPROXIL FUMARATE 200; 300 MG/1; MG/1
TABLET, FILM COATED ORAL
Qty: 30 TABLET | Refills: 10 | Status: SHIPPED | OUTPATIENT
Start: 2018-01-24 | End: 2018-12-03 | Stop reason: SDUPTHER

## 2018-02-26 ENCOUNTER — CLINICAL SUPPORT (OUTPATIENT)
Dept: OCCUPATIONAL MEDICINE | Facility: CLINIC | Age: 63
End: 2018-02-26

## 2018-02-26 DIAGNOSIS — Z02.1 PRE-EMPLOYMENT DRUG SCREENING: ICD-10-CM

## 2018-02-26 PROCEDURE — 80305 DRUG TEST PRSMV DIR OPT OBS: CPT | Mod: S$GLB,,, | Performed by: EMERGENCY MEDICINE

## 2018-03-14 ENCOUNTER — TELEPHONE (OUTPATIENT)
Dept: TRANSPLANT | Facility: CLINIC | Age: 63
End: 2018-03-14

## 2018-04-04 ENCOUNTER — TELEPHONE (OUTPATIENT)
Dept: TRANSPLANT | Facility: CLINIC | Age: 63
End: 2018-04-04

## 2018-04-18 ENCOUNTER — PATIENT MESSAGE (OUTPATIENT)
Dept: TRANSPLANT | Facility: CLINIC | Age: 63
End: 2018-04-18

## 2018-04-19 ENCOUNTER — TELEPHONE (OUTPATIENT)
Dept: TRANSPLANT | Facility: CLINIC | Age: 63
End: 2018-04-19

## 2018-04-19 ENCOUNTER — PATIENT MESSAGE (OUTPATIENT)
Dept: TRANSPLANT | Facility: CLINIC | Age: 63
End: 2018-04-19

## 2018-04-19 NOTE — TELEPHONE ENCOUNTER
Informed pt labs reviewed and LFTs are elevated, no med changes needed but will need repeat labs Monday 4/23 to trend.

## 2018-04-27 ENCOUNTER — HOSPITAL ENCOUNTER (OUTPATIENT)
Dept: RADIOLOGY | Facility: CLINIC | Age: 63
Discharge: HOME | End: 2018-04-27
Attending: INTERNAL MEDICINE
Payer: COMMERCIAL

## 2018-04-27 ENCOUNTER — HOSPITAL ENCOUNTER (OUTPATIENT)
Dept: RADIOLOGY | Facility: HOSPITAL | Age: 63
Discharge: HOME | End: 2018-04-27
Attending: INTERNAL MEDICINE
Payer: COMMERCIAL

## 2018-04-27 DIAGNOSIS — Z79.52 LONG TERM CURRENT USE OF SYSTEMIC STEROIDS: ICD-10-CM

## 2018-04-27 DIAGNOSIS — Z94.4 LIVER REPLACED BY TRANSPLANT: ICD-10-CM

## 2018-04-27 PROCEDURE — 74178 CT ABD&PLV WO CNTR FLWD CNTR: CPT | Mod: 26,,, | Performed by: RADIOLOGY

## 2018-04-27 PROCEDURE — 71260 CT THORAX DX C+: CPT | Mod: 26,,, | Performed by: RADIOLOGY

## 2018-04-27 PROCEDURE — 25500020 PHARM REV CODE 255: Performed by: INTERNAL MEDICINE

## 2018-04-27 PROCEDURE — 77080 DXA BONE DENSITY AXIAL: CPT | Mod: 26,,, | Performed by: INTERNAL MEDICINE

## 2018-04-27 PROCEDURE — 74178 CT ABD&PLV WO CNTR FLWD CNTR: CPT | Mod: TC

## 2018-04-27 PROCEDURE — 77080 DXA BONE DENSITY AXIAL: CPT | Mod: TC

## 2018-04-27 PROCEDURE — 71260 CT THORAX DX C+: CPT | Mod: TC

## 2018-04-27 RX ADMIN — IOHEXOL 75 ML: 350 INJECTION, SOLUTION INTRAVENOUS at 12:04

## 2018-04-30 ENCOUNTER — TELEPHONE (OUTPATIENT)
Dept: TRANSPLANT | Facility: CLINIC | Age: 63
End: 2018-04-30

## 2018-04-30 NOTE — TELEPHONE ENCOUNTER
----- Message from Jim Ball MD sent at 4/29/2018 11:37 PM CDT -----  Results reviewed. Please advise CT scan stable.

## 2018-04-30 NOTE — TELEPHONE ENCOUNTER
CT scan stable, letter sent to pt. Pt rescheduled labs for 5/2 and cancelled clinic appt. Will await results.

## 2018-05-03 ENCOUNTER — TELEPHONE (OUTPATIENT)
Dept: TRANSPLANT | Facility: CLINIC | Age: 63
End: 2018-05-03

## 2018-05-03 DIAGNOSIS — Z94.4 LIVER REPLACED BY TRANSPLANT: Primary | ICD-10-CM

## 2018-05-07 ENCOUNTER — PATIENT MESSAGE (OUTPATIENT)
Dept: TRANSPLANT | Facility: CLINIC | Age: 63
End: 2018-05-07

## 2018-05-07 ENCOUNTER — TELEPHONE (OUTPATIENT)
Dept: TRANSPLANT | Facility: CLINIC | Age: 63
End: 2018-05-07

## 2018-05-07 NOTE — TELEPHONE ENCOUNTER
----- Message from Jim Ball MD sent at 5/5/2018 11:17 PM CDT -----  Results reviewed. Please advise labs are stable.

## 2018-06-01 ENCOUNTER — PATIENT MESSAGE (OUTPATIENT)
Dept: TRANSPLANT | Facility: CLINIC | Age: 63
End: 2018-06-01

## 2018-08-15 RX ORDER — MYCOPHENOLATE MOFETIL 250 MG/1
500 CAPSULE ORAL 2 TIMES DAILY
Qty: 120 CAPSULE | Refills: 11 | Status: SHIPPED | OUTPATIENT
Start: 2018-08-15 | End: 2019-07-29 | Stop reason: SDUPTHER

## 2018-08-15 RX ORDER — TACROLIMUS 1 MG/1
1 CAPSULE ORAL EVERY 12 HOURS
Qty: 60 CAPSULE | Refills: 11 | Status: SHIPPED | OUTPATIENT
Start: 2018-08-15 | End: 2019-07-29 | Stop reason: SDUPTHER

## 2018-08-27 ENCOUNTER — TELEPHONE (OUTPATIENT)
Dept: TRANSPLANT | Facility: CLINIC | Age: 63
End: 2018-08-27

## 2018-08-27 NOTE — TELEPHONE ENCOUNTER
----- Message from Tamika Zaragoza sent at 8/27/2018 10:53 AM CDT -----  Called patient's lab to request results.  Lab reports patient did not come in for lab.  Called patient and left message requesting labs be drawn, 8/27/18.

## 2018-08-29 DIAGNOSIS — Z94.4 LIVER REPLACED BY TRANSPLANT: Primary | ICD-10-CM

## 2018-09-07 ENCOUNTER — LAB VISIT (OUTPATIENT)
Dept: LAB | Facility: HOSPITAL | Age: 63
End: 2018-09-07
Attending: INTERNAL MEDICINE
Payer: MEDICAID

## 2018-09-07 DIAGNOSIS — Z94.4 LIVER REPLACED BY TRANSPLANT: ICD-10-CM

## 2018-09-07 LAB
AFP SERPL-MCNC: 1.8 NG/ML
ALBUMIN SERPL BCP-MCNC: 3.9 G/DL
ALP SERPL-CCNC: 82 U/L
ALT SERPL W/O P-5'-P-CCNC: 14 U/L
ANION GAP SERPL CALC-SCNC: 6 MMOL/L
AST SERPL-CCNC: 19 U/L
BASOPHILS # BLD AUTO: 0.02 K/UL
BASOPHILS NFR BLD: 0.4 %
BILIRUB SERPL-MCNC: 0.6 MG/DL
BUN SERPL-MCNC: 24 MG/DL
CALCIUM SERPL-MCNC: 9.9 MG/DL
CHLORIDE SERPL-SCNC: 106 MMOL/L
CO2 SERPL-SCNC: 28 MMOL/L
CREAT SERPL-MCNC: 1.2 MG/DL
DIFFERENTIAL METHOD: ABNORMAL
EOSINOPHIL # BLD AUTO: 0.2 K/UL
EOSINOPHIL NFR BLD: 4.9 %
ERYTHROCYTE [DISTWIDTH] IN BLOOD BY AUTOMATED COUNT: 12.3 %
EST. GFR  (AFRICAN AMERICAN): >60 ML/MIN/1.73 M^2
EST. GFR  (NON AFRICAN AMERICAN): >60 ML/MIN/1.73 M^2
GLUCOSE SERPL-MCNC: 95 MG/DL
HCT VFR BLD AUTO: 39.7 %
HGB BLD-MCNC: 13.4 G/DL
IMM GRANULOCYTES # BLD AUTO: 0.01 K/UL
IMM GRANULOCYTES NFR BLD AUTO: 0.2 %
LYMPHOCYTES # BLD AUTO: 1.3 K/UL
LYMPHOCYTES NFR BLD: 27.3 %
MCH RBC QN AUTO: 34 PG
MCHC RBC AUTO-ENTMCNC: 33.8 G/DL
MCV RBC AUTO: 101 FL
MONOCYTES # BLD AUTO: 0.5 K/UL
MONOCYTES NFR BLD: 9.7 %
NEUTROPHILS # BLD AUTO: 2.7 K/UL
NEUTROPHILS NFR BLD: 57.5 %
NRBC BLD-RTO: 0 /100 WBC
PLATELET # BLD AUTO: 151 K/UL
PMV BLD AUTO: 12.2 FL
POTASSIUM SERPL-SCNC: 5.3 MMOL/L
PROT SERPL-MCNC: 7.1 G/DL
RBC # BLD AUTO: 3.94 M/UL
SODIUM SERPL-SCNC: 140 MMOL/L
WBC # BLD AUTO: 4.72 K/UL

## 2018-09-07 PROCEDURE — 82105 ALPHA-FETOPROTEIN SERUM: CPT

## 2018-09-07 PROCEDURE — 80053 COMPREHEN METABOLIC PANEL: CPT

## 2018-09-07 PROCEDURE — 87517 HEPATITIS B DNA QUANT: CPT

## 2018-09-07 PROCEDURE — 80197 ASSAY OF TACROLIMUS: CPT

## 2018-09-07 PROCEDURE — 85025 COMPLETE CBC W/AUTO DIFF WBC: CPT

## 2018-09-07 PROCEDURE — 87340 HEPATITIS B SURFACE AG IA: CPT

## 2018-09-08 ENCOUNTER — PATIENT MESSAGE (OUTPATIENT)
Dept: TRANSPLANT | Facility: CLINIC | Age: 63
End: 2018-09-08

## 2018-09-08 LAB — TACROLIMUS BLD-MCNC: 7.6 NG/ML

## 2018-09-09 ENCOUNTER — PATIENT MESSAGE (OUTPATIENT)
Dept: TRANSPLANT | Facility: CLINIC | Age: 63
End: 2018-09-09

## 2018-09-10 ENCOUNTER — TELEPHONE (OUTPATIENT)
Dept: TRANSPLANT | Facility: CLINIC | Age: 63
End: 2018-09-10

## 2018-09-10 DIAGNOSIS — Z94.4 LIVER REPLACED BY TRANSPLANT: Primary | ICD-10-CM

## 2018-09-10 LAB — HBV SURFACE AG SERPL QL IA: NEGATIVE

## 2018-09-10 NOTE — TELEPHONE ENCOUNTER
----- Message from Jim Ball MD sent at 9/9/2018  4:33 PM CDT -----  Results reviewed. Please advise labs are stable.

## 2018-11-14 ENCOUNTER — PATIENT MESSAGE (OUTPATIENT)
Dept: TRANSPLANT | Facility: CLINIC | Age: 63
End: 2018-11-14

## 2018-12-03 RX ORDER — EMTRICITABINE AND TENOFOVIR DISOPROXIL FUMARATE 200; 300 MG/1; MG/1
TABLET, FILM COATED ORAL
Qty: 30 TABLET | Refills: 10 | Status: SHIPPED | OUTPATIENT
Start: 2018-12-03 | End: 2019-10-21 | Stop reason: SDUPTHER

## 2018-12-04 ENCOUNTER — LAB VISIT (OUTPATIENT)
Dept: LAB | Facility: HOSPITAL | Age: 63
End: 2018-12-04
Attending: INTERNAL MEDICINE
Payer: MEDICAID

## 2018-12-04 DIAGNOSIS — Z94.4 LIVER REPLACED BY TRANSPLANT: ICD-10-CM

## 2018-12-04 LAB
ALBUMIN SERPL BCP-MCNC: 3.9 G/DL
ALP SERPL-CCNC: 73 U/L
ALT SERPL W/O P-5'-P-CCNC: 13 U/L
ANION GAP SERPL CALC-SCNC: 5 MMOL/L
AST SERPL-CCNC: 20 U/L
BASOPHILS # BLD AUTO: 0.02 K/UL
BASOPHILS NFR BLD: 0.4 %
BILIRUB SERPL-MCNC: 0.8 MG/DL
BUN SERPL-MCNC: 17 MG/DL
CALCIUM SERPL-MCNC: 9.4 MG/DL
CHLORIDE SERPL-SCNC: 105 MMOL/L
CO2 SERPL-SCNC: 28 MMOL/L
CREAT SERPL-MCNC: 1.1 MG/DL
DIFFERENTIAL METHOD: ABNORMAL
EOSINOPHIL # BLD AUTO: 0.2 K/UL
EOSINOPHIL NFR BLD: 3.9 %
ERYTHROCYTE [DISTWIDTH] IN BLOOD BY AUTOMATED COUNT: 11.9 %
EST. GFR  (AFRICAN AMERICAN): >60 ML/MIN/1.73 M^2
EST. GFR  (NON AFRICAN AMERICAN): >60 ML/MIN/1.73 M^2
GLUCOSE SERPL-MCNC: 95 MG/DL
HCT VFR BLD AUTO: 41.3 %
HGB BLD-MCNC: 13.9 G/DL
IMM GRANULOCYTES # BLD AUTO: 0.01 K/UL
IMM GRANULOCYTES NFR BLD AUTO: 0.2 %
LYMPHOCYTES # BLD AUTO: 1.1 K/UL
LYMPHOCYTES NFR BLD: 23.4 %
MCH RBC QN AUTO: 34 PG
MCHC RBC AUTO-ENTMCNC: 33.7 G/DL
MCV RBC AUTO: 101 FL
MONOCYTES # BLD AUTO: 0.5 K/UL
MONOCYTES NFR BLD: 10.4 %
NEUTROPHILS # BLD AUTO: 2.9 K/UL
NEUTROPHILS NFR BLD: 61.7 %
NRBC BLD-RTO: 0 /100 WBC
PLATELET # BLD AUTO: 156 K/UL
PMV BLD AUTO: 11.8 FL
POTASSIUM SERPL-SCNC: 4.7 MMOL/L
PROT SERPL-MCNC: 7.4 G/DL
RBC # BLD AUTO: 4.09 M/UL
SODIUM SERPL-SCNC: 138 MMOL/L
WBC # BLD AUTO: 4.62 K/UL

## 2018-12-04 PROCEDURE — 87517 HEPATITIS B DNA QUANT: CPT

## 2018-12-04 PROCEDURE — 87340 HEPATITIS B SURFACE AG IA: CPT

## 2018-12-04 PROCEDURE — 85025 COMPLETE CBC W/AUTO DIFF WBC: CPT

## 2018-12-04 PROCEDURE — 80197 ASSAY OF TACROLIMUS: CPT

## 2018-12-04 PROCEDURE — 80053 COMPREHEN METABOLIC PANEL: CPT

## 2018-12-05 ENCOUNTER — TELEPHONE (OUTPATIENT)
Dept: TRANSPLANT | Facility: CLINIC | Age: 63
End: 2018-12-05

## 2018-12-05 LAB
HBV SURFACE AG SERPL QL IA: NEGATIVE
TACROLIMUS BLD-MCNC: 4.8 NG/ML

## 2018-12-05 NOTE — TELEPHONE ENCOUNTER
----- Message from Jim Ball MD sent at 12/5/2018 12:10 PM CST -----  Results reviewed. Please advise labs are stable.

## 2018-12-07 LAB
HBV DNA SERPL NAA+PROBE-ACNC: <10 IU/ML
HBV DNA SERPL NAA+PROBE-LOG IU: <1 LOG (10) IU/ML
HBV DNA SERPL QL NAA+PROBE: NOT DETECTED

## 2018-12-18 ENCOUNTER — PATIENT MESSAGE (OUTPATIENT)
Dept: TRANSPLANT | Facility: CLINIC | Age: 63
End: 2018-12-18

## 2019-01-02 ENCOUNTER — OFFICE VISIT (OUTPATIENT)
Dept: TRANSPLANT | Facility: CLINIC | Age: 64
End: 2019-01-02
Payer: MEDICAID

## 2019-01-02 ENCOUNTER — PATIENT MESSAGE (OUTPATIENT)
Dept: TRANSPLANT | Facility: CLINIC | Age: 64
End: 2019-01-02

## 2019-01-02 VITALS
RESPIRATION RATE: 18 BRPM | SYSTOLIC BLOOD PRESSURE: 132 MMHG | OXYGEN SATURATION: 98 % | HEIGHT: 68 IN | BODY MASS INDEX: 20.95 KG/M2 | HEART RATE: 48 BPM | DIASTOLIC BLOOD PRESSURE: 77 MMHG | TEMPERATURE: 98 F | WEIGHT: 138.25 LBS

## 2019-01-02 DIAGNOSIS — M85.80 OSTEOPENIA, UNSPECIFIED LOCATION: ICD-10-CM

## 2019-01-02 DIAGNOSIS — R68.82 DECREASED LIBIDO: ICD-10-CM

## 2019-01-02 DIAGNOSIS — Z94.4 LIVER REPLACED BY TRANSPLANT: Primary | ICD-10-CM

## 2019-01-02 DIAGNOSIS — Z00.5 HEPATITIS B VIRUS INFECTION IN CADAVERIC DONOR: ICD-10-CM

## 2019-01-02 DIAGNOSIS — B19.10 HEPATITIS B VIRUS INFECTION IN CADAVERIC DONOR: ICD-10-CM

## 2019-01-02 DIAGNOSIS — Z29.89 PROPHYLACTIC IMMUNOTHERAPY: Chronic | ICD-10-CM

## 2019-01-02 DIAGNOSIS — Z86.19 H/O HEPATITIS: ICD-10-CM

## 2019-01-02 PROCEDURE — 99214 OFFICE O/P EST MOD 30 MIN: CPT | Mod: S$PBB,,, | Performed by: NURSE PRACTITIONER

## 2019-01-02 PROCEDURE — 99214 OFFICE O/P EST MOD 30 MIN: CPT | Mod: PBBFAC | Performed by: NURSE PRACTITIONER

## 2019-01-02 PROCEDURE — 99999 PR PBB SHADOW E&M-EST. PATIENT-LVL IV: ICD-10-PCS | Mod: PBBFAC,,, | Performed by: NURSE PRACTITIONER

## 2019-01-02 PROCEDURE — 99999 PR PBB SHADOW E&M-EST. PATIENT-LVL IV: CPT | Mod: PBBFAC,,, | Performed by: NURSE PRACTITIONER

## 2019-01-02 PROCEDURE — 99214 PR OFFICE/OUTPT VISIT, EST, LEVL IV, 30-39 MIN: ICD-10-PCS | Mod: S$PBB,,, | Performed by: NURSE PRACTITIONER

## 2019-01-02 NOTE — PATIENT INSTRUCTIONS
1. Start calcium at [facility] (can get over the counter). About 1000 mg of each.  2. Referral to urology  3. Follow-up with dermatology for skin check  4. CT scan due April 2019  5. Continue labs every 3 months  6. Follow-up in clinic in 1 year

## 2019-01-22 ENCOUNTER — CLINICAL SUPPORT (OUTPATIENT)
Dept: SMOKING CESSATION | Facility: CLINIC | Age: 64
End: 2019-01-22
Payer: COMMERCIAL

## 2019-01-22 DIAGNOSIS — F17.210 HEAVY CIGARETTE SMOKER (20-39 PER DAY): Primary | ICD-10-CM

## 2019-01-22 PROCEDURE — 99999 PR PBB SHADOW E&M-EST. PATIENT-LVL I: CPT | Mod: PBBFAC,,,

## 2019-01-22 PROCEDURE — 99404 PR PREVENT COUNSEL,INDIV,60 MIN: ICD-10-PCS | Mod: S$GLB,,,

## 2019-01-22 PROCEDURE — 99404 PREV MED CNSL INDIV APPRX 60: CPT | Mod: S$GLB,,,

## 2019-01-22 PROCEDURE — 99999 PR PBB SHADOW E&M-EST. PATIENT-LVL I: ICD-10-PCS | Mod: PBBFAC,,,

## 2019-01-22 RX ORDER — IBUPROFEN 200 MG
1 TABLET ORAL DAILY
Qty: 28 PATCH | Refills: 0 | Status: SHIPPED | OUTPATIENT
Start: 2019-01-22 | End: 2019-09-16 | Stop reason: SDUPTHER

## 2019-01-22 RX ORDER — VARENICLINE TARTRATE 0.5 (11)-1
KIT ORAL
Qty: 1 PACKAGE | Refills: 0 | Status: SHIPPED | OUTPATIENT
Start: 2019-01-22 | End: 2019-03-19 | Stop reason: SDUPTHER

## 2019-01-22 NOTE — Clinical Note
Patient seen for the tobacco cessation program. This is his first attempt to stop smoking through our program. He is a cigarette smoker of 1.5  PPD X 44 years. He admits recovering drug and alcohol abuse in the past. He reports receiving a liver transplant 6 years ago and states he will not abuse his new liver. He is motivated to quit the use of tobacco and has agreed to attend our 6 week tobacco cessation program.

## 2019-02-18 ENCOUNTER — CLINICAL SUPPORT (OUTPATIENT)
Dept: SMOKING CESSATION | Facility: CLINIC | Age: 64
End: 2019-02-18
Payer: COMMERCIAL

## 2019-02-18 DIAGNOSIS — F17.210 MODERATE SMOKER (20 OR LESS PER DAY): Primary | ICD-10-CM

## 2019-02-18 PROCEDURE — 99407 PR TOBACCO USE CESSATION INTENSIVE >10 MINUTES: ICD-10-PCS | Mod: S$GLB,,,

## 2019-02-18 PROCEDURE — 99407 BEHAV CHNG SMOKING > 10 MIN: CPT | Mod: S$GLB,,,

## 2019-03-06 ENCOUNTER — LAB VISIT (OUTPATIENT)
Dept: LAB | Facility: HOSPITAL | Age: 64
End: 2019-03-06
Attending: INTERNAL MEDICINE
Payer: MEDICAID

## 2019-03-06 DIAGNOSIS — Z94.4 LIVER REPLACED BY TRANSPLANT: ICD-10-CM

## 2019-03-06 LAB
ALBUMIN SERPL BCP-MCNC: 4 G/DL
ALP SERPL-CCNC: 75 U/L
ALT SERPL W/O P-5'-P-CCNC: 20 U/L
ANION GAP SERPL CALC-SCNC: 5 MMOL/L
AST SERPL-CCNC: 18 U/L
BASOPHILS # BLD AUTO: 0.03 K/UL
BASOPHILS NFR BLD: 0.6 %
BILIRUB SERPL-MCNC: 0.9 MG/DL
BUN SERPL-MCNC: 19 MG/DL
CALCIUM SERPL-MCNC: 9.7 MG/DL
CHLORIDE SERPL-SCNC: 106 MMOL/L
CO2 SERPL-SCNC: 29 MMOL/L
CREAT SERPL-MCNC: 1.1 MG/DL
DIFFERENTIAL METHOD: ABNORMAL
EOSINOPHIL # BLD AUTO: 0.3 K/UL
EOSINOPHIL NFR BLD: 6 %
ERYTHROCYTE [DISTWIDTH] IN BLOOD BY AUTOMATED COUNT: 12.5 %
EST. GFR  (AFRICAN AMERICAN): >60 ML/MIN/1.73 M^2
EST. GFR  (NON AFRICAN AMERICAN): >60 ML/MIN/1.73 M^2
GLUCOSE SERPL-MCNC: 86 MG/DL
HCT VFR BLD AUTO: 40.4 %
HGB BLD-MCNC: 13.8 G/DL
IMM GRANULOCYTES # BLD AUTO: 0.01 K/UL
IMM GRANULOCYTES NFR BLD AUTO: 0.2 %
LYMPHOCYTES # BLD AUTO: 1.4 K/UL
LYMPHOCYTES NFR BLD: 28.3 %
MCH RBC QN AUTO: 34 PG
MCHC RBC AUTO-ENTMCNC: 34.2 G/DL
MCV RBC AUTO: 100 FL
MONOCYTES # BLD AUTO: 0.5 K/UL
MONOCYTES NFR BLD: 10.8 %
NEUTROPHILS # BLD AUTO: 2.7 K/UL
NEUTROPHILS NFR BLD: 54.1 %
NRBC BLD-RTO: 0 /100 WBC
PLATELET # BLD AUTO: 150 K/UL
PMV BLD AUTO: 12.4 FL
POTASSIUM SERPL-SCNC: 4.8 MMOL/L
PROT SERPL-MCNC: 7.1 G/DL
RBC # BLD AUTO: 4.06 M/UL
SODIUM SERPL-SCNC: 140 MMOL/L
WBC # BLD AUTO: 4.98 K/UL

## 2019-03-06 PROCEDURE — 80197 ASSAY OF TACROLIMUS: CPT

## 2019-03-06 PROCEDURE — 87517 HEPATITIS B DNA QUANT: CPT

## 2019-03-06 PROCEDURE — 80053 COMPREHEN METABOLIC PANEL: CPT

## 2019-03-06 PROCEDURE — 85025 COMPLETE CBC W/AUTO DIFF WBC: CPT

## 2019-03-06 PROCEDURE — 36415 COLL VENOUS BLD VENIPUNCTURE: CPT | Mod: PO

## 2019-03-06 PROCEDURE — 87340 HEPATITIS B SURFACE AG IA: CPT

## 2019-03-07 LAB
HBV SURFACE AG SERPL QL IA: NEGATIVE
TACROLIMUS BLD-MCNC: 7.6 NG/ML

## 2019-03-08 ENCOUNTER — TELEPHONE (OUTPATIENT)
Dept: TRANSPLANT | Facility: CLINIC | Age: 64
End: 2019-03-08

## 2019-03-08 DIAGNOSIS — Z94.4 LIVER REPLACED BY TRANSPLANT: Primary | ICD-10-CM

## 2019-03-08 NOTE — TELEPHONE ENCOUNTER
----- Message from Jim Ball MD sent at 3/8/2019 12:29 PM CST -----  Results reviewed. Please advise labs are stable.

## 2019-03-19 ENCOUNTER — CLINICAL SUPPORT (OUTPATIENT)
Dept: SMOKING CESSATION | Facility: CLINIC | Age: 64
End: 2019-03-19
Payer: COMMERCIAL

## 2019-03-19 DIAGNOSIS — F17.210 LIGHT CIGARETTE SMOKER (1-9 CIGS/DAY): Primary | ICD-10-CM

## 2019-03-19 DIAGNOSIS — F17.210 HEAVY CIGARETTE SMOKER (20-39 PER DAY): ICD-10-CM

## 2019-03-19 PROCEDURE — 99407 BEHAV CHNG SMOKING > 10 MIN: CPT | Mod: S$GLB,,,

## 2019-03-19 PROCEDURE — 99407 PR TOBACCO USE CESSATION INTENSIVE >10 MINUTES: ICD-10-PCS | Mod: S$GLB,,,

## 2019-03-19 RX ORDER — VARENICLINE TARTRATE 0.5 (11)-1
KIT ORAL
Qty: 1 PACKAGE | Refills: 0 | Status: SHIPPED | OUTPATIENT
Start: 2019-03-19 | End: 2019-08-12 | Stop reason: SDUPTHER

## 2019-03-25 ENCOUNTER — HOSPITAL ENCOUNTER (EMERGENCY)
Facility: HOSPITAL | Age: 64
Discharge: HOME | End: 2019-03-25
Attending: EMERGENCY MEDICINE | Admitting: INTERNAL MEDICINE
Payer: MEDICAID

## 2019-03-25 VITALS
WEIGHT: 139 LBS | SYSTOLIC BLOOD PRESSURE: 165 MMHG | HEART RATE: 51 BPM | TEMPERATURE: 98 F | OXYGEN SATURATION: 98 % | DIASTOLIC BLOOD PRESSURE: 68 MMHG | RESPIRATION RATE: 18 BRPM | HEIGHT: 66 IN | BODY MASS INDEX: 22.34 KG/M2

## 2019-03-25 DIAGNOSIS — R11.2 NON-INTRACTABLE VOMITING WITH NAUSEA, UNSPECIFIED VOMITING TYPE: ICD-10-CM

## 2019-03-25 DIAGNOSIS — R68.89 MULTIPLE COMPLAINTS: ICD-10-CM

## 2019-03-25 DIAGNOSIS — Z94.4 HISTORY OF LIVER TRANSPLANT: ICD-10-CM

## 2019-03-25 DIAGNOSIS — R10.84 GENERALIZED ABDOMINAL PAIN: Primary | ICD-10-CM

## 2019-03-25 LAB
ALBUMIN SERPL BCP-MCNC: 4.7 G/DL (ref 3.5–5.2)
ALP SERPL-CCNC: 89 U/L (ref 55–135)
ALT SERPL W/O P-5'-P-CCNC: 14 U/L (ref 10–44)
ANION GAP SERPL CALC-SCNC: 12 MMOL/L (ref 8–16)
AST SERPL-CCNC: 18 U/L (ref 10–40)
BACTERIA #/AREA URNS AUTO: NORMAL /HPF
BASOPHILS # BLD AUTO: 0.01 K/UL (ref 0–0.2)
BASOPHILS NFR BLD: 0.1 % (ref 0–1.9)
BILIRUB DIRECT SERPL-MCNC: 0.5 MG/DL (ref 0.1–0.3)
BILIRUB SERPL-MCNC: 1.6 MG/DL (ref 0.1–1)
BILIRUB UR QL STRIP: NEGATIVE
BUN SERPL-MCNC: 24 MG/DL (ref 8–23)
CALCIUM SERPL-MCNC: 10.6 MG/DL (ref 8.7–10.5)
CHLORIDE SERPL-SCNC: 106 MMOL/L (ref 95–110)
CLARITY UR REFRACT.AUTO: CLEAR
CO2 SERPL-SCNC: 23 MMOL/L (ref 23–29)
COLOR UR AUTO: YELLOW
CREAT SERPL-MCNC: 1.3 MG/DL (ref 0.5–1.4)
DIFFERENTIAL METHOD: ABNORMAL
EOSINOPHIL # BLD AUTO: 0 K/UL (ref 0–0.5)
EOSINOPHIL NFR BLD: 0 % (ref 0–8)
ERYTHROCYTE [DISTWIDTH] IN BLOOD BY AUTOMATED COUNT: 12.2 % (ref 11.5–14.5)
EST. GFR  (AFRICAN AMERICAN): >60 ML/MIN/1.73 M^2
EST. GFR  (NON AFRICAN AMERICAN): 58.1 ML/MIN/1.73 M^2
GLUCOSE SERPL-MCNC: 133 MG/DL (ref 70–110)
GLUCOSE UR QL STRIP: NEGATIVE
HCT VFR BLD AUTO: 43.6 % (ref 40–54)
HGB BLD-MCNC: 15.1 G/DL (ref 14–18)
HGB UR QL STRIP: NEGATIVE
HYALINE CASTS UR QL AUTO: 0 /LPF
IMM GRANULOCYTES # BLD AUTO: 0.03 K/UL (ref 0–0.04)
IMM GRANULOCYTES NFR BLD AUTO: 0.3 % (ref 0–0.5)
INR PPP: 1.1 (ref 0.8–1.2)
KETONES UR QL STRIP: ABNORMAL
LEUKOCYTE ESTERASE UR QL STRIP: NEGATIVE
LIPASE SERPL-CCNC: 8 U/L (ref 4–60)
LYMPHOCYTES # BLD AUTO: 0.8 K/UL (ref 1–4.8)
LYMPHOCYTES NFR BLD: 7.5 % (ref 18–48)
MCH RBC QN AUTO: 33.9 PG (ref 27–31)
MCHC RBC AUTO-ENTMCNC: 34.6 G/DL (ref 32–36)
MCV RBC AUTO: 98 FL (ref 82–98)
MICROSCOPIC COMMENT: NORMAL
MONOCYTES # BLD AUTO: 0.4 K/UL (ref 0.3–1)
MONOCYTES NFR BLD: 3.5 % (ref 4–15)
NEUTROPHILS # BLD AUTO: 9.1 K/UL (ref 1.8–7.7)
NEUTROPHILS NFR BLD: 88.6 % (ref 38–73)
NITRITE UR QL STRIP: NEGATIVE
NRBC BLD-RTO: 0 /100 WBC
PH UR STRIP: 8 [PH] (ref 5–8)
PLATELET # BLD AUTO: 217 K/UL (ref 150–350)
PMV BLD AUTO: 11.5 FL (ref 9.2–12.9)
POTASSIUM SERPL-SCNC: 4.1 MMOL/L (ref 3.5–5.1)
PROT SERPL-MCNC: 8.6 G/DL (ref 6–8.4)
PROT UR QL STRIP: ABNORMAL
PROTHROMBIN TIME: 10.9 SEC (ref 9–12.5)
RBC # BLD AUTO: 4.46 M/UL (ref 4.6–6.2)
RBC #/AREA URNS AUTO: 2 /HPF (ref 0–4)
SODIUM SERPL-SCNC: 141 MMOL/L (ref 136–145)
SP GR UR STRIP: 1.02 (ref 1–1.03)
URN SPEC COLLECT METH UR: ABNORMAL
WBC # BLD AUTO: 10.3 K/UL (ref 3.9–12.7)
WBC #/AREA URNS AUTO: 2 /HPF (ref 0–5)

## 2019-03-25 PROCEDURE — 85025 COMPLETE CBC W/AUTO DIFF WBC: CPT

## 2019-03-25 PROCEDURE — 99285 EMERGENCY DEPT VISIT HI MDM: CPT | Mod: 25

## 2019-03-25 PROCEDURE — 85610 PROTHROMBIN TIME: CPT

## 2019-03-25 PROCEDURE — 99285 PR EMERGENCY DEPT VISIT,LEVEL V: ICD-10-PCS | Mod: ,,, | Performed by: EMERGENCY MEDICINE

## 2019-03-25 PROCEDURE — 25000003 PHARM REV CODE 250: Performed by: STUDENT IN AN ORGANIZED HEALTH CARE EDUCATION/TRAINING PROGRAM

## 2019-03-25 PROCEDURE — 63600175 PHARM REV CODE 636 W HCPCS: Performed by: STUDENT IN AN ORGANIZED HEALTH CARE EDUCATION/TRAINING PROGRAM

## 2019-03-25 PROCEDURE — 96376 TX/PRO/DX INJ SAME DRUG ADON: CPT

## 2019-03-25 PROCEDURE — 83690 ASSAY OF LIPASE: CPT

## 2019-03-25 PROCEDURE — 93005 ELECTROCARDIOGRAM TRACING: CPT

## 2019-03-25 PROCEDURE — 93010 EKG 12-LEAD: ICD-10-PCS | Mod: ,,, | Performed by: INTERNAL MEDICINE

## 2019-03-25 PROCEDURE — 93010 ELECTROCARDIOGRAM REPORT: CPT | Mod: ,,, | Performed by: INTERNAL MEDICINE

## 2019-03-25 PROCEDURE — 96361 HYDRATE IV INFUSION ADD-ON: CPT

## 2019-03-25 PROCEDURE — 80053 COMPREHEN METABOLIC PANEL: CPT

## 2019-03-25 PROCEDURE — 96375 TX/PRO/DX INJ NEW DRUG ADDON: CPT

## 2019-03-25 PROCEDURE — 80197 ASSAY OF TACROLIMUS: CPT

## 2019-03-25 PROCEDURE — 96374 THER/PROPH/DIAG INJ IV PUSH: CPT

## 2019-03-25 PROCEDURE — 81001 URINALYSIS AUTO W/SCOPE: CPT

## 2019-03-25 PROCEDURE — 99285 EMERGENCY DEPT VISIT HI MDM: CPT | Mod: ,,, | Performed by: EMERGENCY MEDICINE

## 2019-03-25 PROCEDURE — 82248 BILIRUBIN DIRECT: CPT

## 2019-03-25 RX ORDER — OXYCODONE AND ACETAMINOPHEN 5; 325 MG/1; MG/1
1 TABLET ORAL EVERY 4 HOURS PRN
Qty: 12 TABLET | Refills: 0 | Status: SHIPPED | OUTPATIENT
Start: 2019-03-25

## 2019-03-25 RX ORDER — HYDROMORPHONE HYDROCHLORIDE 1 MG/ML
1.5 INJECTION, SOLUTION INTRAMUSCULAR; INTRAVENOUS; SUBCUTANEOUS
Status: COMPLETED | OUTPATIENT
Start: 2019-03-25 | End: 2019-03-25

## 2019-03-25 RX ORDER — ONDANSETRON 2 MG/ML
8 INJECTION INTRAMUSCULAR; INTRAVENOUS
Status: COMPLETED | OUTPATIENT
Start: 2019-03-25 | End: 2019-03-25

## 2019-03-25 RX ORDER — MORPHINE SULFATE 4 MG/ML
4 INJECTION, SOLUTION INTRAMUSCULAR; INTRAVENOUS
Status: COMPLETED | OUTPATIENT
Start: 2019-03-25 | End: 2019-03-25

## 2019-03-25 RX ORDER — PROMETHAZINE HYDROCHLORIDE 25 MG/1
12.5 TABLET ORAL EVERY 6 HOURS PRN
Qty: 8 TABLET | Refills: 0 | Status: SHIPPED | OUTPATIENT
Start: 2019-03-25

## 2019-03-25 RX ORDER — HYDROMORPHONE HYDROCHLORIDE 1 MG/ML
1 INJECTION, SOLUTION INTRAMUSCULAR; INTRAVENOUS; SUBCUTANEOUS
Status: COMPLETED | OUTPATIENT
Start: 2019-03-25 | End: 2019-03-25

## 2019-03-25 RX ADMIN — ONDANSETRON 8 MG: 2 INJECTION INTRAMUSCULAR; INTRAVENOUS at 05:03

## 2019-03-25 RX ADMIN — HYDROMORPHONE HYDROCHLORIDE 1 MG: 1 INJECTION, SOLUTION INTRAMUSCULAR; INTRAVENOUS; SUBCUTANEOUS at 06:03

## 2019-03-25 RX ADMIN — MORPHINE SULFATE 4 MG: 4 INJECTION, SOLUTION INTRAMUSCULAR; INTRAVENOUS at 05:03

## 2019-03-25 RX ADMIN — HYDROMORPHONE HYDROCHLORIDE 1.5 MG: 1 INJECTION, SOLUTION INTRAMUSCULAR; INTRAVENOUS; SUBCUTANEOUS at 07:03

## 2019-03-25 RX ADMIN — SODIUM CHLORIDE, SODIUM LACTATE, POTASSIUM CHLORIDE, AND CALCIUM CHLORIDE 1000 ML: .6; .31; .03; .02 INJECTION, SOLUTION INTRAVENOUS at 05:03
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
## 2019-03-25 NOTE — ED NOTES
Patient identifiers verified and correct for Mario Grier.    LOC: The patient is awake, alert and aware of environment with an appropriate affect, the patient is oriented x 3 and speaking appropriately.  APPEARANCE: Patient resting comfortably and in no acute distress, patient is clean and well groomed, patient's clothing is properly fastened.  SKIN: The skin is warm and dry, color consistent with ethnicity, patient has normal skin turgor and moist mucus membranes, skin intact, no breakdown or bruising noted.  MUSCULOSKELETAL: Patient moving all extremities spontaneously, no obvious swelling or deformities noted.  RESPIRATORY: Airway is open and patent, respirations are spontaneous, patient has a normal effort and rate, no accessory muscle use noted, bilateral breath sounds clear and present.   CARDIAC: Patient has a normal rate and regular rhythm, no periphreal edema noted, capillary refill < 3 seconds.  ABDOMEN: Soft and tender to palpation, no distention noted, normoactive bowel sounds present in all four quadrants.  NEUROLOGIC: PERRL, 3 mm bilaterally, eyes open spontaneously, behavior appropriate to situation, follows commands, facial expression symmetrical, bilateral hand grasp equal and even, purposeful motor response noted, normal sensation in all extremities when touched with a finger.

## 2019-03-25 NOTE — ED NOTES
Pt reports abd pain, nausea, vomiting and abd pain today. She denies fever, chills, diarrhea. Hx of Liver transplant 5 years ago.

## 2019-03-25 NOTE — PROVIDER PROGRESS NOTES - EMERGENCY DEPT.
Encounter Date: 3/25/2019    ED Physician Progress Notes       SCRIBE NOTE: I, Nury Youngblood, am scribing for, and in the presence of,  Dr. Ugalde.  Physician Statement: I, Dr. Ugalde, personally performed the services described in this documentation as scribed by Nury Youngblood in my presence, and it is both accurate and complete.      EKG - STEMI Decision  Initial Reading: No STEMI present.

## 2019-03-26 ENCOUNTER — PES CALL (OUTPATIENT)
Dept: ADMINISTRATIVE | Facility: CLINIC | Age: 64
End: 2019-03-26

## 2019-03-26 LAB — TACROLIMUS BLD-MCNC: 7.5 NG/ML (ref 5–15)

## 2019-03-26 NOTE — DISCHARGE INSTRUCTIONS
Please contact your Transplant Coordinator and follow up with your Primary Care Provider as soon as possible.

## 2019-04-23 ENCOUNTER — CLINICAL SUPPORT (OUTPATIENT)
Dept: SMOKING CESSATION | Facility: CLINIC | Age: 64
End: 2019-04-23
Payer: COMMERCIAL

## 2019-04-23 DIAGNOSIS — F17.210 LIGHT CIGARETTE SMOKER (1-9 CIGS/DAY): Primary | ICD-10-CM

## 2019-04-23 PROCEDURE — 99407 BEHAV CHNG SMOKING > 10 MIN: CPT | Mod: S$GLB,,,

## 2019-04-23 PROCEDURE — 99407 PR TOBACCO USE CESSATION INTENSIVE >10 MINUTES: ICD-10-PCS | Mod: S$GLB,,,

## 2019-05-15 ENCOUNTER — TELEPHONE (OUTPATIENT)
Dept: TRANSPLANT | Facility: CLINIC | Age: 64
End: 2019-05-15

## 2019-05-15 DIAGNOSIS — Z94.4 LIVER REPLACED BY TRANSPLANT: Primary | ICD-10-CM

## 2019-05-15 NOTE — TELEPHONE ENCOUNTER
----- Message from Natalia Myles MA sent at 5/15/2019  3:07 PM CDT -----  Contact: Pt      ----- Message -----  From: Tatiana Swanson  Sent: 5/15/2019   9:48 AM  To: Lizzy Fernandez Staff    Pt called to speak to the nurse to request a new order CT Scan due to missing his schedule appt and would like the appt schedule on 6/3/19 when he comes in for his lab. Pt also wants a call back to discuss his recent tests and can be reached at [phone]

## 2019-06-04 DIAGNOSIS — Z94.4 LIVER REPLACED BY TRANSPLANT: Primary | ICD-10-CM

## 2019-06-05 ENCOUNTER — HOSPITAL ENCOUNTER (OUTPATIENT)
Dept: RADIOLOGY | Facility: HOSPITAL | Age: 64
Discharge: HOME | End: 2019-06-05
Attending: INTERNAL MEDICINE
Payer: MEDICAID

## 2019-06-05 DIAGNOSIS — Z94.4 LIVER REPLACED BY TRANSPLANT: ICD-10-CM

## 2019-06-05 PROCEDURE — 71260 CT THORAX DX C+: CPT | Mod: 26,,, | Performed by: RADIOLOGY

## 2019-06-05 PROCEDURE — 71260 CT THORAX DX C+: CPT | Mod: TC

## 2019-06-05 PROCEDURE — 71260 CT CHEST WITH CONTRAST: ICD-10-PCS | Mod: 26,,, | Performed by: RADIOLOGY

## 2019-06-05 PROCEDURE — 25500020 PHARM REV CODE 255: Performed by: INTERNAL MEDICINE

## 2019-06-05 PROCEDURE — 74178 CT ABD&PLV WO CNTR FLWD CNTR: CPT | Mod: TC

## 2019-06-05 PROCEDURE — 74178 CT ABDOMEN PELVIS W WO CONTRAST: ICD-10-PCS | Mod: 26,,, | Performed by: RADIOLOGY

## 2019-06-05 PROCEDURE — 74178 CT ABD&PLV WO CNTR FLWD CNTR: CPT | Mod: 26,,, | Performed by: RADIOLOGY

## 2019-06-05 RX ADMIN — IOHEXOL 75 ML: 350 INJECTION, SOLUTION INTRAVENOUS at 01:06

## 2019-06-06 ENCOUNTER — TELEPHONE (OUTPATIENT)
Dept: TRANSPLANT | Facility: CLINIC | Age: 64
End: 2019-06-06

## 2019-06-06 DIAGNOSIS — Z94.4 LIVER REPLACED BY TRANSPLANT: Primary | ICD-10-CM

## 2019-06-06 NOTE — TELEPHONE ENCOUNTER
----- Message from Jim Ball MD sent at 6/6/2019 10:46 AM CDT -----  Results reviewed. Please advise labs are stable.

## 2019-07-29 RX ORDER — MYCOPHENOLATE MOFETIL 250 MG/1
CAPSULE ORAL
Qty: 120 CAPSULE | Refills: 11 | Status: SHIPPED | OUTPATIENT
Start: 2019-07-29 | End: 2019-08-02

## 2019-07-29 RX ORDER — TACROLIMUS 1 MG/1
CAPSULE ORAL
Qty: 60 CAPSULE | Refills: 11 | Status: SHIPPED | OUTPATIENT
Start: 2019-07-29 | End: 2020-07-23

## 2019-08-02 ENCOUNTER — TELEPHONE (OUTPATIENT)
Dept: TRANSPLANT | Facility: CLINIC | Age: 64
End: 2019-08-02

## 2019-08-02 NOTE — TELEPHONE ENCOUNTER
"----- Message from Jim Ball MD sent at 8/1/2019 10:17 PM CDT -----  Regarding: RE:  Dc cellcept. 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     He is on cellcept it was added back after biopsy in 2016- LFTs have been normal since we added it back.     "Liver Biopsy: chronic hepatitis with some plasma cells     Plan : check DSA; add cellcept 500mg bid"  "

## 2019-08-02 NOTE — TELEPHONE ENCOUNTER
Reviewed pt's recent diagnosis of sqaumous cell carcinomoa of the groin with Dr. Ball. Pt to start topical cream of Fluoroseal x 8 weeks. Dr. Ball states for pt to d/c cellcept. Will recheck labs in 2 weeks on 8/12.

## 2019-08-06 PROBLEM — C44.92 SQUAMOUS CELL SKIN CANCER: Status: ACTIVE | Noted: 2019-07-18

## 2019-08-09 ENCOUNTER — CLINICAL SUPPORT (OUTPATIENT)
Dept: SMOKING CESSATION | Facility: CLINIC | Age: 64
End: 2019-08-09
Payer: COMMERCIAL

## 2019-08-09 DIAGNOSIS — F17.200 NICOTINE DEPENDENCE: Primary | ICD-10-CM

## 2019-08-09 PROCEDURE — 99407 PR TOBACCO USE CESSATION INTENSIVE >10 MINUTES: ICD-10-PCS | Mod: S$GLB,,, | Performed by: INTERNAL MEDICINE

## 2019-08-09 PROCEDURE — 99407 BEHAV CHNG SMOKING > 10 MIN: CPT | Mod: S$GLB,,, | Performed by: INTERNAL MEDICINE

## 2019-08-09 NOTE — PROGRESS NOTES
Spoke with patient today in regard to smoking cessation progress for 3/6 month telephone follow up, he states not tobacco free. Patient has scheduled an appointment to return to the program for quit attempt #2 on 8/12/2019 @ 10:00 am. Informed patient of benefit period, future follow ups, and contact information if any further help or support is needed. Will resolve episode and complete smart form for Quit attempt #1.

## 2019-08-12 ENCOUNTER — CLINICAL SUPPORT (OUTPATIENT)
Dept: SMOKING CESSATION | Facility: CLINIC | Age: 64
End: 2019-08-12
Payer: COMMERCIAL

## 2019-08-12 ENCOUNTER — LAB VISIT (OUTPATIENT)
Dept: LAB | Facility: HOSPITAL | Age: 64
End: 2019-08-12
Attending: INTERNAL MEDICINE
Payer: MEDICAID

## 2019-08-12 DIAGNOSIS — Z94.4 LIVER REPLACED BY TRANSPLANT: ICD-10-CM

## 2019-08-12 DIAGNOSIS — F17.200 NICOTINE DEPENDENCE: Primary | ICD-10-CM

## 2019-08-12 LAB
ALBUMIN SERPL BCP-MCNC: 4.1 G/DL (ref 3.5–5.2)
ALP SERPL-CCNC: 76 U/L (ref 55–135)
ALT SERPL W/O P-5'-P-CCNC: 13 U/L (ref 10–44)
ANION GAP SERPL CALC-SCNC: 10 MMOL/L (ref 8–16)
AST SERPL-CCNC: 19 U/L (ref 10–40)
BASOPHILS # BLD AUTO: 0.03 K/UL (ref 0–0.2)
BASOPHILS NFR BLD: 0.6 % (ref 0–1.9)
BILIRUB SERPL-MCNC: 0.5 MG/DL (ref 0.1–1)
BUN SERPL-MCNC: 24 MG/DL (ref 8–23)
CALCIUM SERPL-MCNC: 10.1 MG/DL (ref 8.7–10.5)
CHLORIDE SERPL-SCNC: 105 MMOL/L (ref 95–110)
CO2 SERPL-SCNC: 25 MMOL/L (ref 23–29)
CREAT SERPL-MCNC: 1.1 MG/DL (ref 0.5–1.4)
DIFFERENTIAL METHOD: ABNORMAL
EOSINOPHIL # BLD AUTO: 0.3 K/UL (ref 0–0.5)
EOSINOPHIL NFR BLD: 5.7 % (ref 0–8)
ERYTHROCYTE [DISTWIDTH] IN BLOOD BY AUTOMATED COUNT: 12.3 % (ref 11.5–14.5)
EST. GFR  (AFRICAN AMERICAN): >60 ML/MIN/1.73 M^2
EST. GFR  (NON AFRICAN AMERICAN): >60 ML/MIN/1.73 M^2
GLUCOSE SERPL-MCNC: 85 MG/DL (ref 70–110)
HCT VFR BLD AUTO: 41.5 % (ref 40–54)
HGB BLD-MCNC: 13.3 G/DL (ref 14–18)
IMM GRANULOCYTES # BLD AUTO: 0.01 K/UL (ref 0–0.04)
IMM GRANULOCYTES NFR BLD AUTO: 0.2 % (ref 0–0.5)
LYMPHOCYTES # BLD AUTO: 1.4 K/UL (ref 1–4.8)
LYMPHOCYTES NFR BLD: 27.4 % (ref 18–48)
MCH RBC QN AUTO: 33.7 PG (ref 27–31)
MCHC RBC AUTO-ENTMCNC: 32 G/DL (ref 32–36)
MCV RBC AUTO: 105 FL (ref 82–98)
MONOCYTES # BLD AUTO: 0.6 K/UL (ref 0.3–1)
MONOCYTES NFR BLD: 11.2 % (ref 4–15)
NEUTROPHILS # BLD AUTO: 2.7 K/UL (ref 1.8–7.7)
NEUTROPHILS NFR BLD: 54.9 % (ref 38–73)
NRBC BLD-RTO: 0 /100 WBC
PLATELET # BLD AUTO: 157 K/UL (ref 150–350)
PMV BLD AUTO: 12 FL (ref 9.2–12.9)
POTASSIUM SERPL-SCNC: 4.8 MMOL/L (ref 3.5–5.1)
PROT SERPL-MCNC: 7.4 G/DL (ref 6–8.4)
RBC # BLD AUTO: 3.95 M/UL (ref 4.6–6.2)
SODIUM SERPL-SCNC: 140 MMOL/L (ref 136–145)
WBC # BLD AUTO: 4.92 K/UL (ref 3.9–12.7)

## 2019-08-12 PROCEDURE — 80197 ASSAY OF TACROLIMUS: CPT

## 2019-08-12 PROCEDURE — 87517 HEPATITIS B DNA QUANT: CPT

## 2019-08-12 PROCEDURE — 80053 COMPREHEN METABOLIC PANEL: CPT

## 2019-08-12 PROCEDURE — 87340 HEPATITIS B SURFACE AG IA: CPT

## 2019-08-12 PROCEDURE — 36415 COLL VENOUS BLD VENIPUNCTURE: CPT | Mod: PO

## 2019-08-12 PROCEDURE — 99404 PREV MED CNSL INDIV APPRX 60: CPT | Mod: S$GLB,,,

## 2019-08-12 PROCEDURE — 85025 COMPLETE CBC W/AUTO DIFF WBC: CPT

## 2019-08-12 PROCEDURE — 99404 PR PREVENT COUNSEL,INDIV,60 MIN: ICD-10-PCS | Mod: S$GLB,,,

## 2019-08-12 RX ORDER — MICONAZOLE NITRATE 2 %
2 CREAM (GRAM) TOPICAL
Qty: 100 EACH | Refills: 0 | Status: SHIPPED | OUTPATIENT
Start: 2019-08-12 | End: 2019-09-16

## 2019-08-12 RX ORDER — VARENICLINE TARTRATE 0.5 (11)-1
KIT ORAL
Qty: 1 PACKAGE | Refills: 0 | Status: SHIPPED | OUTPATIENT
Start: 2019-08-12 | End: 2019-09-16 | Stop reason: SDUPTHER

## 2019-08-13 ENCOUNTER — TELEPHONE (OUTPATIENT)
Dept: TRANSPLANT | Facility: CLINIC | Age: 64
End: 2019-08-13

## 2019-08-13 LAB
HBV SURFACE AG SERPL QL IA: NEGATIVE
TACROLIMUS BLD-MCNC: 8.2 NG/ML (ref 5–15)

## 2019-08-13 NOTE — TELEPHONE ENCOUNTER
----- Message from Jim Ball MD sent at 8/13/2019 11:06 AM CDT -----  Results reviewed. Please advise labs are stable.

## 2019-08-20 DIAGNOSIS — Z94.4 LIVER REPLACED BY TRANSPLANT: Primary | ICD-10-CM

## 2019-09-09 ENCOUNTER — CLINICAL SUPPORT (OUTPATIENT)
Dept: SMOKING CESSATION | Facility: CLINIC | Age: 64
End: 2019-09-09
Payer: COMMERCIAL

## 2019-09-09 DIAGNOSIS — F17.210 MODERATE SMOKER (20 OR LESS PER DAY): Primary | ICD-10-CM

## 2019-09-09 PROCEDURE — 99407 BEHAV CHNG SMOKING > 10 MIN: CPT | Mod: S$GLB,,,

## 2019-09-09 PROCEDURE — 99407 PR TOBACCO USE CESSATION INTENSIVE >10 MINUTES: ICD-10-PCS | Mod: S$GLB,,,

## 2019-09-16 ENCOUNTER — CLINICAL SUPPORT (OUTPATIENT)
Dept: SMOKING CESSATION | Facility: CLINIC | Age: 64
End: 2019-09-16
Payer: COMMERCIAL

## 2019-09-16 DIAGNOSIS — F17.210 SMOKES 1/2 PACK/DAY OR LESS: Primary | ICD-10-CM

## 2019-09-16 DIAGNOSIS — F17.200 NICOTINE DEPENDENCE: ICD-10-CM

## 2019-09-16 DIAGNOSIS — F17.210 HEAVY CIGARETTE SMOKER (20-39 PER DAY): ICD-10-CM

## 2019-09-16 PROCEDURE — 99999 PR PBB SHADOW E&M-EST. PATIENT-LVL I: CPT | Mod: PBBFAC,,,

## 2019-09-16 PROCEDURE — 99404 PR PREVENT COUNSEL,INDIV,60 MIN: ICD-10-PCS | Mod: S$GLB,,,

## 2019-09-16 PROCEDURE — 99404 PREV MED CNSL INDIV APPRX 60: CPT | Mod: S$GLB,,,

## 2019-09-16 PROCEDURE — 99999 PR PBB SHADOW E&M-EST. PATIENT-LVL I: ICD-10-PCS | Mod: PBBFAC,,,

## 2019-09-16 RX ORDER — DM/P-EPHED/ACETAMINOPH/DOXYLAM 30-7.5/3
2 LIQUID (ML) ORAL
Qty: 108 LOZENGE | Refills: 0 | Status: SHIPPED | OUTPATIENT
Start: 2019-09-16 | End: 2023-04-03

## 2019-09-16 RX ORDER — VARENICLINE TARTRATE 0.5 (11)-1
KIT ORAL
Qty: 1 PACKAGE | Refills: 0 | Status: SHIPPED | OUTPATIENT
Start: 2019-09-16 | End: 2023-04-03

## 2019-09-16 RX ORDER — IBUPROFEN 200 MG
1 TABLET ORAL DAILY
Qty: 28 PATCH | Refills: 0 | Status: SHIPPED | OUTPATIENT
Start: 2019-09-16 | End: 2023-04-03

## 2019-09-16 NOTE — PROGRESS NOTES
Individual Follow-Up Form    9/16/2019    Quit Date: none    Clinical Status of Patient: Outpatient    Length of Service: 60 minutes    Continuing Medication: yes  Chantix, Patches or Nicotine Lozenges    Other Medications: none     Target Symptoms: Withdrawal and medication side effects. The following were  rated moderate (3) to severe (4) on TCRS:  · Moderate (3): none  · Severe (4): none    Comments: Patient reports decreasing cigarette smoking from 1 pack per day to a half pack per day. Discussed and reviewed strategies, cues, and triggers. Introduced the negative impact of tobacco on health, the health advantages of discontinuing the use of tobacco, time line improved health changes after a quit, withdrawal issues to expect from nicotine and habit, and ways to achieve the goal of a quit. The patient remains on the prescribed tobacco cessation medication regimen of 1 mg Chantix BID with a 21 mg nicotine patch QD without any negative side effects at this time. Patient requested nicotine lozenges to help with strong triggers to smoke. The patient denies any abnormal behavioral or mental changes at this time. The patient will continue with group therapy sessions and medication monitoring by CTTS. Prescribed medication management will be by physician.       Diagnosis: F17.210    Next Visit: 1 week

## 2019-09-16 NOTE — Clinical Note
Patient reports decreasing cigarette smoking from 1 pack per day to a half pack per day. Discussed and reviewed strategies, cues, and triggers. Introduced the negative impact of tobacco on health, the health advantages of discontinuing the use of tobacco, time line improved health changes after a quit, withdrawal issues to expect from nicotine and habit, and ways to achieve the goal of a quit. The patient remains on the prescribed tobacco cessation medication regimen of 1 mg Chantix BID with a 21 mg nicotine patch QD without any negative side effects at this time. Patient requested nicotine lozenges to help with strong triggers to smoke. The patient denies any abnormal behavioral or mental changes at this time. The patient will continue with group therapy sessions and medication monitoring by CTTS. Prescribed medication management will be by physician.

## 2019-10-22 RX ORDER — EMTRICITABINE AND TENOFOVIR DISOPROXIL FUMARATE 200; 300 MG/1; MG/1
TABLET, FILM COATED ORAL
Qty: 30 TABLET | Refills: 10 | Status: SHIPPED | OUTPATIENT
Start: 2019-10-22 | End: 2020-09-21

## 2019-11-11 ENCOUNTER — LAB VISIT (OUTPATIENT)
Dept: LAB | Facility: HOSPITAL | Age: 64
End: 2019-11-11
Attending: INTERNAL MEDICINE
Payer: MEDICAID

## 2019-11-11 DIAGNOSIS — Z94.4 LIVER REPLACED BY TRANSPLANT: ICD-10-CM

## 2019-11-11 LAB
ALBUMIN SERPL BCP-MCNC: 4 G/DL (ref 3.5–5.2)
ALP SERPL-CCNC: 66 U/L (ref 55–135)
ALT SERPL W/O P-5'-P-CCNC: 10 U/L (ref 10–44)
ANION GAP SERPL CALC-SCNC: 10 MMOL/L (ref 8–16)
AST SERPL-CCNC: 15 U/L (ref 10–40)
BASOPHILS # BLD AUTO: 0.03 K/UL (ref 0–0.2)
BASOPHILS NFR BLD: 0.6 % (ref 0–1.9)
BILIRUB SERPL-MCNC: 0.5 MG/DL (ref 0.1–1)
BUN SERPL-MCNC: 15 MG/DL (ref 8–23)
CALCIUM SERPL-MCNC: 9.4 MG/DL (ref 8.7–10.5)
CHLORIDE SERPL-SCNC: 106 MMOL/L (ref 95–110)
CO2 SERPL-SCNC: 25 MMOL/L (ref 23–29)
CREAT SERPL-MCNC: 1.2 MG/DL (ref 0.5–1.4)
DIFFERENTIAL METHOD: ABNORMAL
EOSINOPHIL # BLD AUTO: 0.3 K/UL (ref 0–0.5)
EOSINOPHIL NFR BLD: 5.8 % (ref 0–8)
ERYTHROCYTE [DISTWIDTH] IN BLOOD BY AUTOMATED COUNT: 12 % (ref 11.5–14.5)
EST. GFR  (AFRICAN AMERICAN): >60 ML/MIN/1.73 M^2
EST. GFR  (NON AFRICAN AMERICAN): >60 ML/MIN/1.73 M^2
GLUCOSE SERPL-MCNC: 79 MG/DL (ref 70–110)
HCT VFR BLD AUTO: 41.6 % (ref 40–54)
HGB BLD-MCNC: 13.7 G/DL (ref 14–18)
IMM GRANULOCYTES # BLD AUTO: 0.01 K/UL (ref 0–0.04)
IMM GRANULOCYTES NFR BLD AUTO: 0.2 % (ref 0–0.5)
LYMPHOCYTES # BLD AUTO: 1.6 K/UL (ref 1–4.8)
LYMPHOCYTES NFR BLD: 33.6 % (ref 18–48)
MCH RBC QN AUTO: 34.2 PG (ref 27–31)
MCHC RBC AUTO-ENTMCNC: 32.9 G/DL (ref 32–36)
MCV RBC AUTO: 104 FL (ref 82–98)
MONOCYTES # BLD AUTO: 0.5 K/UL (ref 0.3–1)
MONOCYTES NFR BLD: 10.8 % (ref 4–15)
NEUTROPHILS # BLD AUTO: 2.3 K/UL (ref 1.8–7.7)
NEUTROPHILS NFR BLD: 49 % (ref 38–73)
NRBC BLD-RTO: 0 /100 WBC
PLATELET # BLD AUTO: 175 K/UL (ref 150–350)
PMV BLD AUTO: 12.2 FL (ref 9.2–12.9)
POTASSIUM SERPL-SCNC: 4.8 MMOL/L (ref 3.5–5.1)
PROT SERPL-MCNC: 7.2 G/DL (ref 6–8.4)
RBC # BLD AUTO: 4.01 M/UL (ref 4.6–6.2)
SODIUM SERPL-SCNC: 141 MMOL/L (ref 136–145)
WBC # BLD AUTO: 4.64 K/UL (ref 3.9–12.7)

## 2019-11-11 PROCEDURE — 87517 HEPATITIS B DNA QUANT: CPT

## 2019-11-11 PROCEDURE — 80053 COMPREHEN METABOLIC PANEL: CPT

## 2019-11-11 PROCEDURE — 80197 ASSAY OF TACROLIMUS: CPT

## 2019-11-11 PROCEDURE — 87340 HEPATITIS B SURFACE AG IA: CPT

## 2019-11-11 PROCEDURE — 36415 COLL VENOUS BLD VENIPUNCTURE: CPT | Mod: PO

## 2019-11-11 PROCEDURE — 85025 COMPLETE CBC W/AUTO DIFF WBC: CPT

## 2019-11-12 ENCOUNTER — TELEPHONE (OUTPATIENT)
Dept: TRANSPLANT | Facility: CLINIC | Age: 64
End: 2019-11-12

## 2019-11-12 LAB — TACROLIMUS BLD-MCNC: 6.1 NG/ML (ref 5–15)

## 2019-11-12 NOTE — TELEPHONE ENCOUNTER
----- Message from Jim Ball MD sent at 11/12/2019 11:17 AM CST -----  Results reviewed. Please advise labs are stable.

## 2019-11-13 LAB
HBV DNA SERPL NAA+PROBE-ACNC: <10 IU/ML
HBV DNA SERPL NAA+PROBE-LOG IU: <1 LOG (10) IU/ML
HBV DNA SERPL QL NAA+PROBE: NOT DETECTED
HBV SURFACE AG SERPL QL IA: NEGATIVE

## 2020-01-05 ENCOUNTER — PATIENT MESSAGE (OUTPATIENT)
Dept: TRANSPLANT | Facility: CLINIC | Age: 65
End: 2020-01-05

## 2020-01-20 ENCOUNTER — OFFICE VISIT (OUTPATIENT)
Dept: TRANSPLANT | Facility: CLINIC | Age: 65
End: 2020-01-20
Payer: MEDICAID

## 2020-01-20 VITALS
OXYGEN SATURATION: 98 % | DIASTOLIC BLOOD PRESSURE: 86 MMHG | HEART RATE: 53 BPM | WEIGHT: 140.63 LBS | SYSTOLIC BLOOD PRESSURE: 143 MMHG | BODY MASS INDEX: 22.7 KG/M2 | RESPIRATION RATE: 16 BRPM | TEMPERATURE: 98 F

## 2020-01-20 DIAGNOSIS — M85.80 OSTEOPENIA, UNSPECIFIED LOCATION: ICD-10-CM

## 2020-01-20 DIAGNOSIS — C44.92 SQUAMOUS CELL SKIN CANCER: ICD-10-CM

## 2020-01-20 DIAGNOSIS — Z29.89 PROPHYLACTIC IMMUNOTHERAPY: Chronic | ICD-10-CM

## 2020-01-20 DIAGNOSIS — Z86.19 H/O HEPATITIS: ICD-10-CM

## 2020-01-20 DIAGNOSIS — Z94.4 LIVER REPLACED BY TRANSPLANT: Primary | ICD-10-CM

## 2020-01-20 DIAGNOSIS — B19.10 HEPATITIS B VIRUS INFECTION IN CADAVERIC DONOR: ICD-10-CM

## 2020-01-20 DIAGNOSIS — Z00.5 HEPATITIS B VIRUS INFECTION IN CADAVERIC DONOR: ICD-10-CM

## 2020-01-20 DIAGNOSIS — M85.89 OSTEOPENIA OF MULTIPLE SITES: ICD-10-CM

## 2020-01-20 DIAGNOSIS — T86.49 LIVER FIBROSIS, TRANSPLANTED LIVER: ICD-10-CM

## 2020-01-20 DIAGNOSIS — K74.00 LIVER FIBROSIS, TRANSPLANTED LIVER: ICD-10-CM

## 2020-01-20 PROCEDURE — 99214 PR OFFICE/OUTPT VISIT, EST, LEVL IV, 30-39 MIN: ICD-10-PCS | Mod: S$PBB,,, | Performed by: NURSE PRACTITIONER

## 2020-01-20 PROCEDURE — 99999 PR PBB SHADOW E&M-EST. PATIENT-LVL V: CPT | Mod: PBBFAC,,, | Performed by: NURSE PRACTITIONER

## 2020-01-20 PROCEDURE — 99214 OFFICE O/P EST MOD 30 MIN: CPT | Mod: S$PBB,,, | Performed by: NURSE PRACTITIONER

## 2020-01-20 PROCEDURE — 99999 PR PBB SHADOW E&M-EST. PATIENT-LVL V: ICD-10-PCS | Mod: PBBFAC,,, | Performed by: NURSE PRACTITIONER

## 2020-01-20 PROCEDURE — 99215 OFFICE O/P EST HI 40 MIN: CPT | Mod: PBBFAC | Performed by: NURSE PRACTITIONER

## 2020-01-20 NOTE — LETTER
January 20, 2020        Neto Roblero  [address] [facility] [place] [address]  Phone: [phone]  Fax: [phone]             [facility]  [address]  [facility] [address]  Phone: [phone]   Patient: Mario Grier   MR Number: [id number]   YOB: 1955   Date of Visit: 1/20/2020       Dear Dr. Neto Roblero    Thank you for referring Mario Grier to me for evaluation. Attached you will find relevant portions of my assessment and plan of care.    If you have questions, please do not hesitate to call me. I look forward to following Mario Grier along with you.    Sincerely,    Krysten Hamilton NP    Enclosure    If you would like to receive this communication electronically, please contact [email or web address] or [phone] to request [employer] access.    [employer] is a tool which provides read-only access to select patient information with whom you have a relationship. Its easy to use and provides real time access to review your patients record including encounter summaries, notes, results, and demographic information.    If you feel you have received this communication in error or would no longer like to receive these types of communications, please e-mail [email or web address]

## 2020-01-20 NOTE — PATIENT INSTRUCTIONS
1. Continue your labs every 3 months, follow up in clinic at least yearly   2. Recommend close f/u with dermatologist given your skin cancer history  3. DEXA bone scan to assess for osteoporosis, next due 4/2020  4. Follow up with your primary care doctor for blood pressure, cholesterol and diabetes screenings at least yearly There is a higher chance of high blood pressure, diabetes, high cholesterol and weight gain post transplant, so recommend to follow closely with your primary doctor to monitor for these conditions or worsening of these conditions. Recommend diabetes screening at least yearly   5. Will plan to do a test in the clinic when you return in a year to assess for fatty liver and any scar tissue in the liver. Its called a fibroscan and is a quick ultrasound type test that is done in the clinic. I will have your coordinator schedule it when you return in 1 year. You need to be fasting for 3 hours before appt   6. Recommend f/u with PCP to screen for diabetes. Can also check your thyroid function at that time. If your thyroid function is abnormal, recommend f/u with endocrinology   7. STOP cellcept       Liver cancer surveillance plan:   -- CT chest/abd and pelvis w/ and without contrast yearly   -- AFP yearly   -- at 5 years, for stage 3, consult with MD    EDUCATION:  -- You have an increased risk of infection (bacterial, viral, or fungal infections) because you are on immunosuppression medications  -- schedule colonoscopy and annual well visits/screenings as recommended by your primary care provider  -- increased risk of cancer, including skin cancers. Avoid sun, wears hat when outside, use sunscreen with at least 15 spf at all times when outside, schedule yearly dermatology skin checks (at higher risk for skin cancer post transplant)  -- increased risk of kidney disease with the use of immunosuppression medications  -- DXA (bone) scan to assess for bone thinning (osteoporosis), recommended every 2  years for any patient with low bone density on testing, repeat due 4/2020

## 2020-01-20 NOTE — PROGRESS NOTES
Transplant Hepatology  Liver Transplant Recipient Follow-up    Transplant Date: 7/11/2013  UNOS Native Liver Dx: Primary Liver Malignancy: Hepatoma (HCC) and Cirrhosis    Mario is here for follow up of his liver transplant.    ORGAN: LIVER  Whole or Partial: whole liver  Donor Type: donation after brain death  CDC High Risk: no  Donor CMV Status: Negative  Donor HCV Status: Positive  Donor HBcAb: Positive  Donor HBV TRUONG: Positive  Donor HCV TRUONG:   Biliary Anastomosis: end to end  Arterial Anatomy: replaced left hepatic from left gastric  IVC reconstruction: end to end ivc  Portal vein status: patent    CMV -/- HCV +/+ HBcAb -/+ (Truvada)    He has had the following complications since transplant: recurrent Hep C (s/p treatment with Harvoni with SVR), squamous cell skin CA of groin . The noted complications are well controlled.    recurrent hepatitis C. The noted complications are well controlled. HCV s/p treatment with Harvoni with successful SVR in 2015    Squamous cell carcinoma of the groin -followed by [facility]    Subjective:     Interval History: Mario was last seen on 1/2019 by GRACY Valle NP. He is currently   6 years s/p liver transplant  2/2 HCV/alcohol and HCC  Currently, he is doing well. Current complaints include none.    Hepatologist: Dr. Ball    Explant Pathology  Hep C cirrhosis, h/o HCC stage 3  PREVIOUS BIOPSIES:  -- Past liver biopsies:   8/2013: no rejection, chronic Hep C, stage 1 fibrosis  10/2014: no rejection, chronic Hep C, stage 2 fibrosis  9/2016: not diagnostic of ACR, stage 3 fibrosis, portal based chronic hepatitis with  fibrosis that is currently unexplained    History of rejection : possible suspicion in 2016 per chart review (?)  Current Immunosuppression: currently taking Prograf 1/1, prograf level WNL  -- previous immunosuppression regimens: previously on MMF addition in 9/2016 due to liver biopsy in 9/2016 showing chronic hepatitis and some plasma cells, ? chronic AMR,  discussed 8/2019 due to skin cancer     Liver allograft function: excellent    Lab Results   Component Value Date    ALT 10 11/11/2019    AST 15 11/11/2019    ALKPHOS 66 11/11/2019    BILITOT 0.5 11/11/2019    ALBUMIN 4.0 11/11/2019    INR 1.1 03/25/2019     11/11/2019    CREATININE 1.2 11/11/2019    TACROLIMUS 6.1 11/11/2019       No recurrent infections, cancer, any hospitalizations since last visit   No h/o DM or HTN  Last Fibroscan : will arrange soon due to past biopsy results     HEALTH MAINTENANCE:  1. Last DXA scan: 4/2018, osteopenia multiple sites, repeat due 4/2020  2. Last dermatology skin assessment - followed closely by [facility] per pt report, f/u next month   3. A1c - per PCP       Review of Systems   Constitutional: Negative for activity change, appetite change, chills, fatigue, fever and unexpected weight change.   HENT: Negative for congestion and postnasal drip.    Eyes: Negative.    Respiratory: Negative for cough and shortness of breath.    Cardiovascular: Negative for chest pain and leg swelling.   Gastrointestinal: Negative for abdominal distention, abdominal pain, constipation, diarrhea, nausea and vomiting.   Endocrine: Negative.    Genitourinary: Negative for dysuria, flank pain, frequency and urgency.   Musculoskeletal: Negative.    Skin: Negative for rash and wound.   Allergic/Immunologic: Positive for immunocompromised state.   Neurological: Negative for dizziness, weakness, numbness and headaches.   Hematological: Negative for adenopathy. Does not bruise/bleed easily.   Psychiatric/Behavioral: Negative.        Objective:     Physical Exam   Constitutional: He is oriented to person, place, and time. He appears well-developed and well-nourished.   HENT:   Head: Normocephalic and atraumatic.   Eyes: Pupils are equal, round, and reactive to light. EOM are normal. No scleral icterus.   Neck: Normal range of motion. Neck supple.   Cardiovascular: Normal rate, regular rhythm  and normal heart sounds.   No murmur heard.  Pulmonary/Chest: Effort normal and breath sounds normal. He has no wheezes. He has no rales.   Abdominal: Soft. Bowel sounds are normal. He exhibits no distension and no mass. There is no tenderness. There is no rebound and no guarding.   Chevron incision well healed, no redness or drainage   Musculoskeletal: Normal range of motion. He exhibits no edema or tenderness.   Lymphadenopathy:     He has no cervical adenopathy.   Neurological: He is alert and oriented to person, place, and time.   Skin: Skin is warm and dry. Capillary refill takes less than 2 seconds. No rash noted. No erythema.   Psychiatric: He has a normal mood and affect. His behavior is normal.     Lab Results   Component Value Date    BILITOT 0.5 11/11/2019    AST 15 11/11/2019    ALT 10 11/11/2019    ALKPHOS 66 11/11/2019    CREATININE 1.2 11/11/2019    ALBUMIN 4.0 11/11/2019     Lab Results   Component Value Date    WBC 4.64 11/11/2019    HGB 13.7 (L) 11/11/2019    HCT 41.6 11/11/2019    HCT 32 (L) 07/11/2013     11/11/2019     Lab Results   Component Value Date    TACROLIMUS 6.1 11/11/2019       Assessment/Plan:     1. Liver replaced by transplant    2. Liver fibrosis, transplanted liver    3. Osteopenia, unspecified location    4. Osteopenia of multiple sites    5. Squamous cell skin cancer    6. Prophylactic immunotherapy    7. 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 H/O hepatitis C      1. S/p liver transplant 2013, liver disease 2/2 HCV/alcohol and HCC    2. Immunosuppression - currently taking Prograf 1/1,  currently taking Prograf 1/1, prograf level WNL  -- previous immunosuppression regimens: previously on MMF addition in 9/2016 due to liver biopsy in 9/2016 showing chronic hepatitis and some plasma cells, ? chronic AMR, discussed 8/2019 due to skin cancer   --- immunosuppression stable, kidney function stable    3. Hep B donor  -- on Truvada, labs negative 11/2019, continue  Hep B labs per protocol     4. Liver fibrosis  -- noted on biopsy in 2014, worsening in 2016, fibrosis first noted on biopsy before Hep C treatment   -- fibroscan soon to reassess given fibrosis noted on biopsy     5. History of Hep C with SVR  -- HCV s/p treatment with Harvoni with successful SVR.     6. Health maintenance  -- DXA scan to assess for bone thinning (osteoporosis), next due now, order placed   -- Diabetes screening with A1c with PCP   -- Colonoscopy needed per the USPSTF screening guidelines  -- Fibroscan yearly,will plan for now, see below     7. H/o HCC stage 3  Stage 3    -- CT chest/abd and pelvis w/ and without contrast yearly   -- AFP yearly   -- at 5 years, for stage 3, consult with MD    8. Squamous cell CA   -- increased risk of cancer, including skin cancers. avoid sun, wears hat when outside, use sunscreen with at least 15 spf at all times when outside,  yearly dermatology skin checks, followed closely by outside derm per pt report    9. Osteopenia  -- recommend regular weight-bearing exercises to build your bones  --Not Taking Vitamin D 1000 IU daily and Calcium 1000 mg daily, pt will start     9. Current smoker   -- recommend complete cessation of smoking, as strong association of lung, head, and neck cancers with smoking post transplant on immunosuppression, pt aware      EDUCATION:  -- Discussed the increased risk of infection (bacterial, viral, or fungal infections) due to lifelong immunosuppression medications  -- Instructed to schedule colonoscopy and annual well visits/screenings as recommended by your primary care provider  -- Discussed the increased risk of hypertension, diabetes, high cholesterol and weight gain post transplant, so recommended tp follow closely with primary doctor to monitor for these conditions or worsening of these conditions. Discussed increased risk of  diabetes with rejection medications post transplant so recommended diabetes screening at least yearly by  primary provider  -- Discussed increased risk of cancer, including skin cancers. Instructed to avoid sun, wears hat when outside, use sunscreen with at least 15 spf at all times when outside, schedule yearly dermatology skin checks (at higher risk for skin cancer post transplant)  -- Discussed increased risk of kidney disease with the use of immunosuppression medications      PLAN:  1. RTC in 1 year, labs per protocol   2. Fibroscan soon given stage 3 fibrosis noted on liver biopsy in the past.   Please schedule   3. Continue local dermatology follow up for skin cancer surveillance    4. HCC screening per protocol   5. DEXA scan due 4/2020  6. Pt did not stop Cellcept in 2019 as instructed. Pt instructed to stop today. Per last chart notes, recommendation was to repeat labs 2 weeks after Cellcept stopped, please arrange   7. Discussed no alcohol     Note routed to post-liver transplant coordinator pool, Dr. Lizzy Hamilton, SYEDA       UNOS Patient Status  Functional Status: 100% - Normal, no complaints, no evidence of disease  Physical Capacity: No Limitations

## 2020-01-23 ENCOUNTER — CLINICAL SUPPORT (OUTPATIENT)
Dept: SMOKING CESSATION | Facility: CLINIC | Age: 65
End: 2020-01-23
Payer: COMMERCIAL

## 2020-01-23 DIAGNOSIS — F17.200 NICOTINE DEPENDENCE: Primary | ICD-10-CM

## 2020-01-23 PROCEDURE — 99407 BEHAV CHNG SMOKING > 10 MIN: CPT | Mod: S$GLB,,,

## 2020-01-23 PROCEDURE — 99407 PR TOBACCO USE CESSATION INTENSIVE >10 MINUTES: ICD-10-PCS | Mod: S$GLB,,,

## 2020-01-23 NOTE — PROGRESS NOTES
Spoke with patient today in regard to smoking cessation progress for 12 month telephone follow up on quit 1 and 3 month quit 2. Patient states that he is not tobacco free at this time. Not interested in making an appointment at this time..  Informed patient of benefit period, future follow up, and contact information if any further help or support is needed. Will complete smart form for 12 month follow up on Quit attempt #1 and complete smart for quit 2 3 month..

## 2020-02-11 ENCOUNTER — LAB VISIT (OUTPATIENT)
Dept: LAB | Facility: HOSPITAL | Age: 65
End: 2020-02-11
Attending: INTERNAL MEDICINE
Payer: MEDICAID

## 2020-02-11 DIAGNOSIS — Z94.4 LIVER REPLACED BY TRANSPLANT: ICD-10-CM

## 2020-02-11 LAB
ALBUMIN SERPL BCP-MCNC: 4.1 G/DL (ref 3.5–5.2)
ALP SERPL-CCNC: 71 U/L (ref 55–135)
ALT SERPL W/O P-5'-P-CCNC: 12 U/L (ref 10–44)
ANION GAP SERPL CALC-SCNC: 8 MMOL/L (ref 8–16)
AST SERPL-CCNC: 17 U/L (ref 10–40)
BASOPHILS # BLD AUTO: 0.03 K/UL (ref 0–0.2)
BASOPHILS NFR BLD: 0.6 % (ref 0–1.9)
BILIRUB SERPL-MCNC: 0.6 MG/DL (ref 0.1–1)
BUN SERPL-MCNC: 25 MG/DL (ref 8–23)
CALCIUM SERPL-MCNC: 9.2 MG/DL (ref 8.7–10.5)
CHLORIDE SERPL-SCNC: 106 MMOL/L (ref 95–110)
CO2 SERPL-SCNC: 26 MMOL/L (ref 23–29)
CREAT SERPL-MCNC: 1.4 MG/DL (ref 0.5–1.4)
DIFFERENTIAL METHOD: ABNORMAL
EOSINOPHIL # BLD AUTO: 0.3 K/UL (ref 0–0.5)
EOSINOPHIL NFR BLD: 5.9 % (ref 0–8)
ERYTHROCYTE [DISTWIDTH] IN BLOOD BY AUTOMATED COUNT: 12.1 % (ref 11.5–14.5)
EST. GFR  (AFRICAN AMERICAN): >60 ML/MIN/1.73 M^2
EST. GFR  (NON AFRICAN AMERICAN): 52.7 ML/MIN/1.73 M^2
GLUCOSE SERPL-MCNC: 87 MG/DL (ref 70–110)
HCT VFR BLD AUTO: 43.6 % (ref 40–54)
HGB BLD-MCNC: 13.9 G/DL (ref 14–18)
IMM GRANULOCYTES # BLD AUTO: 0.01 K/UL (ref 0–0.04)
IMM GRANULOCYTES NFR BLD AUTO: 0.2 % (ref 0–0.5)
LYMPHOCYTES # BLD AUTO: 1.7 K/UL (ref 1–4.8)
LYMPHOCYTES NFR BLD: 34.8 % (ref 18–48)
MCH RBC QN AUTO: 33.7 PG (ref 27–31)
MCHC RBC AUTO-ENTMCNC: 31.9 G/DL (ref 32–36)
MCV RBC AUTO: 106 FL (ref 82–98)
MONOCYTES # BLD AUTO: 0.5 K/UL (ref 0.3–1)
MONOCYTES NFR BLD: 10.6 % (ref 4–15)
NEUTROPHILS # BLD AUTO: 2.4 K/UL (ref 1.8–7.7)
NEUTROPHILS NFR BLD: 47.9 % (ref 38–73)
NRBC BLD-RTO: 0 /100 WBC
PLATELET # BLD AUTO: 147 K/UL (ref 150–350)
PMV BLD AUTO: 12.6 FL (ref 9.2–12.9)
POTASSIUM SERPL-SCNC: 4.7 MMOL/L (ref 3.5–5.1)
PROT SERPL-MCNC: 7.4 G/DL (ref 6–8.4)
RBC # BLD AUTO: 4.13 M/UL (ref 4.6–6.2)
SODIUM SERPL-SCNC: 140 MMOL/L (ref 136–145)
WBC # BLD AUTO: 4.91 K/UL (ref 3.9–12.7)

## 2020-02-11 PROCEDURE — 85025 COMPLETE CBC W/AUTO DIFF WBC: CPT

## 2020-02-11 PROCEDURE — 87340 HEPATITIS B SURFACE AG IA: CPT

## 2020-02-11 PROCEDURE — 36415 COLL VENOUS BLD VENIPUNCTURE: CPT | Mod: PO

## 2020-02-11 PROCEDURE — 80197 ASSAY OF TACROLIMUS: CPT

## 2020-02-11 PROCEDURE — 87517 HEPATITIS B DNA QUANT: CPT

## 2020-02-11 PROCEDURE — 80053 COMPREHEN METABOLIC PANEL: CPT

## 2020-02-12 LAB — TACROLIMUS BLD-MCNC: 6.1 NG/ML (ref 5–15)

## 2020-02-13 ENCOUNTER — TELEPHONE (OUTPATIENT)
Dept: TRANSPLANT | Facility: CLINIC | Age: 65
End: 2020-02-13

## 2020-02-13 DIAGNOSIS — Z94.4 LIVER REPLACED BY TRANSPLANT: Primary | ICD-10-CM

## 2020-02-13 DIAGNOSIS — Z85.05 PERSONAL HISTORY OF LIVER CANCER: ICD-10-CM

## 2020-02-13 LAB — HBV SURFACE AG SERPL QL IA: NEGATIVE

## 2020-02-13 NOTE — TELEPHONE ENCOUNTER
----- Message from Jim Ball MD sent at 2/12/2020  6:53 PM CST -----  Results reviewed. Please advise labs are stable.

## 2020-02-13 NOTE — LETTER
February 13, 2020    Mario Grier  [address]  [place] [address]          Dear Mario Grier:  MRN: [id number]    This is a follow up to your recent labs, your lab results were stable.  There are no medicine changes.  Please have your labs drawn again on 5/11/20.      If you cannot have your labs drawn on the scheduled date, it is your responsibility to call the transplant department to reschedule.  To reschedule or make an appointment, please as to speak to or leave a message for my assistant, Marisol Scruggs or Shari, at [phone].  When leaving a message for Marisol Scruggs Angela or myself, we ask that you leave a brief message regarding your request.    Sincerely,    Marybeth Ruiz, RN,BSN,CCTC    Your Liver Transplant Coordinator    [facility]  [address] [place] [id number]  [phone]

## 2020-02-14 ENCOUNTER — PATIENT MESSAGE (OUTPATIENT)
Dept: TRANSPLANT | Facility: CLINIC | Age: 65
End: 2020-02-14

## 2020-02-14 ENCOUNTER — TELEPHONE (OUTPATIENT)
Dept: TRANSPLANT | Facility: CLINIC | Age: 65
End: 2020-02-14

## 2020-03-02 ENCOUNTER — TELEPHONE (OUTPATIENT)
Dept: HEPATOLOGY | Facility: CLINIC | Age: 65
End: 2020-03-02

## 2020-03-02 NOTE — TELEPHONE ENCOUNTER
Attempted to contact patient and reschedule his appointments as he requested for tomorrow but patient did not answer. I was unable to leave a voicemail due to the patient not having one set up. Awaiting a call back.

## 2020-03-02 NOTE — TELEPHONE ENCOUNTER
Patient attempted to contact me back to get him rescheduled but was unsuccessful due to the call center not forwarding the call to me. I attempted to contact the patient again but the patient did not answer. Awaiting a call back from patient.

## 2020-03-06 ENCOUNTER — TELEPHONE (OUTPATIENT)
Dept: TRANSPLANT | Facility: CLINIC | Age: 65
End: 2020-03-06

## 2020-03-09 ENCOUNTER — TELEPHONE (OUTPATIENT)
Dept: TRANSPLANT | Facility: CLINIC | Age: 65
End: 2020-03-09

## 2020-03-18 ENCOUNTER — CLINICAL SUPPORT (OUTPATIENT)
Dept: SMOKING CESSATION | Facility: CLINIC | Age: 65
End: 2020-03-18
Payer: COMMERCIAL

## 2020-03-18 DIAGNOSIS — F17.200 NICOTINE DEPENDENCE: Primary | ICD-10-CM

## 2020-03-18 PROCEDURE — 99407 PR TOBACCO USE CESSATION INTENSIVE >10 MINUTES: ICD-10-PCS | Mod: S$GLB,,, | Performed by: INTERNAL MEDICINE

## 2020-03-18 PROCEDURE — 99407 BEHAV CHNG SMOKING > 10 MIN: CPT | Mod: S$GLB,,, | Performed by: INTERNAL MEDICINE

## 2020-03-18 NOTE — PROGRESS NOTES
Spoke with patient today in regard to smoking cessation progress for 6 month telephone follow up, he states not tobacco free. Patient states smoking about 14 cigs/day and not ready to return to the program at this time. Informed patient of benefit period, future follow up, and contact information if any further help or support is needed. Will complete smart form for 6 month follow up on Quit attempt #2.

## 2020-04-30 ENCOUNTER — PATIENT MESSAGE (OUTPATIENT)
Dept: TRANSPLANT | Facility: CLINIC | Age: 65
End: 2020-04-30

## 2020-06-04 ENCOUNTER — LAB VISIT (OUTPATIENT)
Dept: LAB | Facility: HOSPITAL | Age: 65
End: 2020-06-04
Attending: INTERNAL MEDICINE
Payer: MEDICAID

## 2020-06-04 DIAGNOSIS — Z94.4 LIVER REPLACED BY TRANSPLANT: ICD-10-CM

## 2020-06-04 LAB
AFP SERPL-MCNC: 1.6 NG/ML (ref 0–8.4)
ALBUMIN SERPL BCP-MCNC: 3.6 G/DL (ref 3.5–5.2)
ALP SERPL-CCNC: 68 U/L (ref 55–135)
ALT SERPL W/O P-5'-P-CCNC: 23 U/L (ref 10–44)
ANION GAP SERPL CALC-SCNC: 3 MMOL/L (ref 8–16)
AST SERPL-CCNC: 20 U/L (ref 10–40)
BASOPHILS # BLD AUTO: 0.04 K/UL (ref 0–0.2)
BASOPHILS NFR BLD: 0.9 % (ref 0–1.9)
BILIRUB SERPL-MCNC: 0.4 MG/DL (ref 0.1–1)
BUN SERPL-MCNC: 31 MG/DL (ref 8–23)
CALCIUM SERPL-MCNC: 9.1 MG/DL (ref 8.7–10.5)
CHLORIDE SERPL-SCNC: 106 MMOL/L (ref 95–110)
CO2 SERPL-SCNC: 32 MMOL/L (ref 23–29)
CREAT SERPL-MCNC: 1.4 MG/DL (ref 0.5–1.4)
DIFFERENTIAL METHOD: ABNORMAL
EOSINOPHIL # BLD AUTO: 0.3 K/UL (ref 0–0.5)
EOSINOPHIL NFR BLD: 5.8 % (ref 0–8)
ERYTHROCYTE [DISTWIDTH] IN BLOOD BY AUTOMATED COUNT: 11.9 % (ref 11.5–14.5)
EST. GFR  (AFRICAN AMERICAN): >60 ML/MIN/1.73 M^2
EST. GFR  (NON AFRICAN AMERICAN): 52.7 ML/MIN/1.73 M^2
GLUCOSE SERPL-MCNC: 90 MG/DL (ref 70–110)
HCT VFR BLD AUTO: 41.9 % (ref 40–54)
HGB BLD-MCNC: 13.5 G/DL (ref 14–18)
IMM GRANULOCYTES # BLD AUTO: 0 K/UL (ref 0–0.04)
IMM GRANULOCYTES NFR BLD AUTO: 0 % (ref 0–0.5)
LYMPHOCYTES # BLD AUTO: 1.9 K/UL (ref 1–4.8)
LYMPHOCYTES NFR BLD: 41.1 % (ref 18–48)
MCH RBC QN AUTO: 33.8 PG (ref 27–31)
MCHC RBC AUTO-ENTMCNC: 32.2 G/DL (ref 32–36)
MCV RBC AUTO: 105 FL (ref 82–98)
MONOCYTES # BLD AUTO: 0.4 K/UL (ref 0.3–1)
MONOCYTES NFR BLD: 9 % (ref 4–15)
NEUTROPHILS # BLD AUTO: 2 K/UL (ref 1.8–7.7)
NEUTROPHILS NFR BLD: 43.2 % (ref 38–73)
NRBC BLD-RTO: 0 /100 WBC
PLATELET # BLD AUTO: 142 K/UL (ref 150–350)
PMV BLD AUTO: 12.2 FL (ref 9.2–12.9)
POTASSIUM SERPL-SCNC: 5.1 MMOL/L (ref 3.5–5.1)
PROT SERPL-MCNC: 6.6 G/DL (ref 6–8.4)
RBC # BLD AUTO: 3.99 M/UL (ref 4.6–6.2)
SODIUM SERPL-SCNC: 141 MMOL/L (ref 136–145)
WBC # BLD AUTO: 4.67 K/UL (ref 3.9–12.7)

## 2020-06-04 PROCEDURE — 80053 COMPREHEN METABOLIC PANEL: CPT

## 2020-06-04 PROCEDURE — 85025 COMPLETE CBC W/AUTO DIFF WBC: CPT

## 2020-06-04 PROCEDURE — 36415 COLL VENOUS BLD VENIPUNCTURE: CPT | Mod: PO

## 2020-06-04 PROCEDURE — 80197 ASSAY OF TACROLIMUS: CPT

## 2020-06-04 PROCEDURE — 87340 HEPATITIS B SURFACE AG IA: CPT

## 2020-06-04 PROCEDURE — 87517 HEPATITIS B DNA QUANT: CPT

## 2020-06-04 PROCEDURE — 82105 ALPHA-FETOPROTEIN SERUM: CPT

## 2020-06-05 LAB
HBV SURFACE AG SERPL QL IA: NEGATIVE
TACROLIMUS BLD-MCNC: 6 NG/ML (ref 5–15)

## 2020-06-08 ENCOUNTER — TELEPHONE (OUTPATIENT)
Dept: TRANSPLANT | Facility: CLINIC | Age: 65
End: 2020-06-08

## 2020-06-08 DIAGNOSIS — Z94.4 LIVER REPLACED BY TRANSPLANT: Primary | ICD-10-CM

## 2020-06-08 NOTE — LETTER
June 8, 2020    Mario Grier  [address]  [place] [address]          Dear Mario Grier:  MRN: [id number]    This is a follow up to your recent labs, your lab results were stable.  There are no medicine changes.  Please have your labs drawn again on 8/31/20.      If you cannot have your labs drawn on the scheduled date, it is your responsibility to call the transplant department to reschedule.  Please call [phone] and ask to speak to Shari PRIETO -  for all scheduling requests.     Sincerely,    Marybeth Ruiz, RN,BSN,CCTC    Your Liver Transplant Coordinator    [facility]  [address] [place] [id number] [phone]

## 2020-06-08 NOTE — TELEPHONE ENCOUNTER
----- Message from Jim Ball MD sent at 6/8/2020  7:30 AM CDT -----  Results reviewed. Please advise labs are stable.

## 2020-06-10 ENCOUNTER — HOSPITAL ENCOUNTER (OUTPATIENT)
Dept: RADIOLOGY | Facility: HOSPITAL | Age: 65
Discharge: HOME | End: 2020-06-10
Attending: INTERNAL MEDICINE
Payer: MEDICAID

## 2020-06-10 ENCOUNTER — PROCEDURE VISIT (OUTPATIENT)
Dept: HEPATOLOGY | Facility: CLINIC | Age: 65
End: 2020-06-10
Payer: MEDICAID

## 2020-06-10 ENCOUNTER — HOSPITAL ENCOUNTER (OUTPATIENT)
Dept: RADIOLOGY | Facility: CLINIC | Age: 65
Discharge: HOME | End: 2020-06-10
Attending: NURSE PRACTITIONER
Payer: MEDICAID

## 2020-06-10 DIAGNOSIS — M85.89 OSTEOPENIA OF MULTIPLE SITES: ICD-10-CM

## 2020-06-10 DIAGNOSIS — Z94.4 LIVER REPLACED BY TRANSPLANT: ICD-10-CM

## 2020-06-10 DIAGNOSIS — T86.49 LIVER FIBROSIS, TRANSPLANTED LIVER: ICD-10-CM

## 2020-06-10 DIAGNOSIS — Z85.05 PERSONAL HISTORY OF LIVER CANCER: ICD-10-CM

## 2020-06-10 DIAGNOSIS — K74.00 LIVER FIBROSIS, TRANSPLANTED LIVER: ICD-10-CM

## 2020-06-10 PROCEDURE — 71260 CT CHEST ABDOMEN PELVIS WITH CONTRAST (XPD): ICD-10-PCS | Mod: 26,,, | Performed by: RADIOLOGY

## 2020-06-10 PROCEDURE — 74177 CT ABD & PELVIS W/CONTRAST: CPT | Mod: TC

## 2020-06-10 PROCEDURE — 77080 DXA BONE DENSITY AXIAL: CPT | Mod: TC

## 2020-06-10 PROCEDURE — 91200 LIVER ELASTOGRAPHY: CPT | Mod: PBBFAC | Performed by: NURSE PRACTITIONER

## 2020-06-10 PROCEDURE — 91200 FIBROSCAN (VIBRATION CONTROLLED TRANSIENT ELASTOGRAPHY): ICD-10-PCS | Mod: 26,S$PBB,, | Performed by: NURSE PRACTITIONER

## 2020-06-10 PROCEDURE — 77080 DEXA BONE DENSITY SPINE HIP: ICD-10-PCS | Mod: 26,,, | Performed by: INTERNAL MEDICINE

## 2020-06-10 PROCEDURE — 74177 CT ABD & PELVIS W/CONTRAST: CPT | Mod: 26,,, | Performed by: RADIOLOGY

## 2020-06-10 PROCEDURE — 71260 CT THORAX DX C+: CPT | Mod: 26,,, | Performed by: RADIOLOGY

## 2020-06-10 PROCEDURE — 91200 LIVER ELASTOGRAPHY: CPT | Mod: 26,S$PBB,, | Performed by: NURSE PRACTITIONER

## 2020-06-10 PROCEDURE — 25500020 PHARM REV CODE 255: Performed by: INTERNAL MEDICINE

## 2020-06-10 PROCEDURE — 74177 CT CHEST ABDOMEN PELVIS WITH CONTRAST (XPD): ICD-10-PCS | Mod: 26,,, | Performed by: RADIOLOGY

## 2020-06-10 PROCEDURE — 77080 DXA BONE DENSITY AXIAL: CPT | Mod: 26,,, | Performed by: INTERNAL MEDICINE

## 2020-06-10 RX ADMIN — IOHEXOL 75 ML: 350 INJECTION, SOLUTION INTRAVENOUS at 12:06

## 2020-06-11 ENCOUNTER — TELEPHONE (OUTPATIENT)
Dept: TRANSPLANT | Facility: CLINIC | Age: 65
End: 2020-06-11

## 2020-06-12 NOTE — PROCEDURES
FibroScan (Vibration Controlled Transient Elastography)  Date/Time: 6/10/2020 10:15 AM  Performed by: Krysten Hamilton NP  Authorized by: Krysten Hamilton NP     Diagnosis:  HCV    Probe:     Universal Protocol: Patient's identity, procedure and site were verified, confirmatory pause was performed. Discussed procedure including risks and potential complications.  Questions answered.  Patient verbalizes understanding and wishes to proceed with VCTE.     Procedure: After providing explanations of the procedure, patient was placed in the supine position with right arm in maximum abduction to allow optimal exposure of right lateral abdomen.  Patient was briefly assessed, Testing was performed in the mid-axillary location, 50Hz Shear Wave pulses were applied and the resulting Shear Wave and Propagation Speed detected with a 3.5 MHz ultrasonic signal, using the FibroScan probe, Skin to liver capsule distance and liver parenchyma were accessed during the entire examination with the FibroScan probe, Patient was instructed to breathe normally and to abstain from sudden movements during the procedure, allowing for random measurements of liver stiffness. At least 10 Shear Waves were produced, Individual measurements of each Shear Wave were calculated.  Patient tolerated the procedure well with no complications.  Meets discharge criteria as was dismissed.  Rates pain 0 out of 10.  Patient will follow up with ordering provider to review results.      Findings  Median liver stiffness score:  10.8  CAP Reading: dB/m:  184    IQR/med %:  4  Interpretation  Fibrosis interpretation is based on medial liver stiffness - Kilopascal (kPa).    Fibrosis Stage:  F3  Steatosis interpretation is based on controlled attenuation parameter - (dB/m).    Steatosis Grade:  <S1

## 2020-06-15 ENCOUNTER — TELEPHONE (OUTPATIENT)
Dept: HEPATOLOGY | Facility: CLINIC | Age: 65
End: 2020-06-15

## 2020-06-15 NOTE — TELEPHONE ENCOUNTER
See below message from Dr. Ball    Please notify pt of advanced fibrosis/pre-cirrhosis in the liver, recommend HCC screening (imaging q6 months, AFP q6 months) for life - please help pt arrange q6 months

## 2020-06-15 NOTE — TELEPHONE ENCOUNTER
----- Message from Jim Ball MD sent at 6/13/2020  2:41 PM CDT -----  Regarding: RE: fibrosis continues on recent fibroscan, F3, plan for HCC screening   I agree.  I would move surveillance up to every 6 months.  ----- Message -----  From: Krysten Hamilton NP  Sent: 6/12/2020   7:51 AM CDT  To: Jim Ball MD, #  Subject: fibrosis continues on recent fibroscan, F3, #    Dr. Ball    Mr. Grier is currently 6 years s/p liver transplant  2/2 HCV/alcohol and HCC    He has had significant/worsening liver fibrosis noted on biopsy in 2014, worsening in 2016, fibrosis first noted on biopsy before Hep C treatment and last biopsy showed F3.    Fibroscan done to assess if advanced fibrosis remains and it continues to show F3    Currently, he is getting HCC screening yearly because of HCC at time of transplant, which I am guessing may go away in the futuer per protocol.   Do you recommend that his HCC screening be changed to every 6 months given F3 on fibroscan (and continued indefinitely)? Any other recs?     Thanks  Krysten

## 2020-06-18 DIAGNOSIS — M81.6 LOCALIZED OSTEOPOROSIS WITHOUT CURRENT PATHOLOGICAL FRACTURE: ICD-10-CM

## 2020-06-23 DIAGNOSIS — Z94.4 LIVER REPLACED BY TRANSPLANT: Primary | ICD-10-CM

## 2020-08-17 ENCOUNTER — CLINICAL SUPPORT (OUTPATIENT)
Dept: SMOKING CESSATION | Facility: CLINIC | Age: 65
End: 2020-08-17
Payer: COMMERCIAL

## 2020-08-17 DIAGNOSIS — F17.200 NICOTINE DEPENDENCE: Primary | ICD-10-CM

## 2020-08-17 PROCEDURE — 99407 PR TOBACCO USE CESSATION INTENSIVE >10 MINUTES: ICD-10-PCS | Mod: S$GLB,,,

## 2020-08-17 PROCEDURE — 99407 BEHAV CHNG SMOKING > 10 MIN: CPT | Mod: S$GLB,,,

## 2020-08-17 NOTE — PROGRESS NOTES
Spoke with patient today in regard to smoking cessation progress for 12 month telephone follow up on quit 2. Patient states that he is not tobacco free at this time. Not interested in making an appointment .  Informed patient of benefit period,  and contact information if any further help or support is needed. Will complete smart form for 12 month follow up on Quit attempt #2.

## 2020-08-28 ENCOUNTER — LAB VISIT (OUTPATIENT)
Dept: LAB | Facility: HOSPITAL | Age: 65
End: 2020-08-28
Attending: INTERNAL MEDICINE
Payer: MEDICAID

## 2020-08-28 DIAGNOSIS — Z94.4 LIVER REPLACED BY TRANSPLANT: ICD-10-CM

## 2020-08-28 LAB
ALBUMIN SERPL BCP-MCNC: 3.9 G/DL (ref 3.5–5.2)
ALP SERPL-CCNC: 89 U/L (ref 55–135)
ALT SERPL W/O P-5'-P-CCNC: 15 U/L (ref 10–44)
ANION GAP SERPL CALC-SCNC: 7 MMOL/L (ref 8–16)
AST SERPL-CCNC: 18 U/L (ref 10–40)
BASOPHILS # BLD AUTO: 0.02 K/UL (ref 0–0.2)
BASOPHILS NFR BLD: 0.3 % (ref 0–1.9)
BILIRUB SERPL-MCNC: 0.7 MG/DL (ref 0.1–1)
BUN SERPL-MCNC: 21 MG/DL (ref 8–23)
CALCIUM SERPL-MCNC: 9 MG/DL (ref 8.7–10.5)
CHLORIDE SERPL-SCNC: 106 MMOL/L (ref 95–110)
CO2 SERPL-SCNC: 25 MMOL/L (ref 23–29)
CREAT SERPL-MCNC: 1.5 MG/DL (ref 0.5–1.4)
DIFFERENTIAL METHOD: ABNORMAL
EOSINOPHIL # BLD AUTO: 0.3 K/UL (ref 0–0.5)
EOSINOPHIL NFR BLD: 5.4 % (ref 0–8)
ERYTHROCYTE [DISTWIDTH] IN BLOOD BY AUTOMATED COUNT: 12.4 % (ref 11.5–14.5)
EST. GFR  (AFRICAN AMERICAN): 56.1 ML/MIN/1.73 M^2
EST. GFR  (NON AFRICAN AMERICAN): 48.5 ML/MIN/1.73 M^2
GLUCOSE SERPL-MCNC: 99 MG/DL (ref 70–110)
HCT VFR BLD AUTO: 37.8 % (ref 40–54)
HGB BLD-MCNC: 12.6 G/DL (ref 14–18)
IMM GRANULOCYTES # BLD AUTO: 0.01 K/UL (ref 0–0.04)
IMM GRANULOCYTES NFR BLD AUTO: 0.2 % (ref 0–0.5)
LYMPHOCYTES # BLD AUTO: 1.4 K/UL (ref 1–4.8)
LYMPHOCYTES NFR BLD: 24.4 % (ref 18–48)
MCH RBC QN AUTO: 34.8 PG (ref 27–31)
MCHC RBC AUTO-ENTMCNC: 33.3 G/DL (ref 32–36)
MCV RBC AUTO: 104 FL (ref 82–98)
MONOCYTES # BLD AUTO: 0.6 K/UL (ref 0.3–1)
MONOCYTES NFR BLD: 9.3 % (ref 4–15)
NEUTROPHILS # BLD AUTO: 3.6 K/UL (ref 1.8–7.7)
NEUTROPHILS NFR BLD: 60.4 % (ref 38–73)
NRBC BLD-RTO: 0 /100 WBC
PLATELET # BLD AUTO: 121 K/UL (ref 150–350)
PMV BLD AUTO: 12.1 FL (ref 9.2–12.9)
POTASSIUM SERPL-SCNC: 4.9 MMOL/L (ref 3.5–5.1)
PROT SERPL-MCNC: 6.9 G/DL (ref 6–8.4)
RBC # BLD AUTO: 3.62 M/UL (ref 4.6–6.2)
SODIUM SERPL-SCNC: 138 MMOL/L (ref 136–145)
WBC # BLD AUTO: 5.91 K/UL (ref 3.9–12.7)

## 2020-08-28 PROCEDURE — 87517 HEPATITIS B DNA QUANT: CPT

## 2020-08-28 PROCEDURE — 85025 COMPLETE CBC W/AUTO DIFF WBC: CPT

## 2020-08-28 PROCEDURE — 87340 HEPATITIS B SURFACE AG IA: CPT

## 2020-08-28 PROCEDURE — 80197 ASSAY OF TACROLIMUS: CPT

## 2020-08-28 PROCEDURE — 80053 COMPREHEN METABOLIC PANEL: CPT

## 2020-08-28 PROCEDURE — 36415 COLL VENOUS BLD VENIPUNCTURE: CPT | Mod: PO

## 2020-08-29 LAB — TACROLIMUS BLD-MCNC: 6 NG/ML (ref 5–15)

## 2020-08-31 ENCOUNTER — TELEPHONE (OUTPATIENT)
Dept: TRANSPLANT | Facility: CLINIC | Age: 65
End: 2020-08-31

## 2020-08-31 NOTE — LETTER
August 31, 2020    Mario Marvel  [address]  [place] [address]          Dear Mario Grier:  MRN: [id number]    This is a follow up to your recent labs, your lab results were stable.  There are no medicine changes.  Please have your labs drawn again on 11/30/20.      If you cannot have your labs drawn on the scheduled date, it is your responsibility to call the transplant department to reschedule.  Please call [phone] and ask to speak to Shari PRIETO -  for all scheduling requests.     Sincerely,    Marybeth Ruiz, RN,BSN,CCTC    Your Liver Transplant Coordinator    [facility]  [address] [place] [address] [phone]

## 2020-08-31 NOTE — TELEPHONE ENCOUNTER
----- Message from Jim Ball MD sent at 8/30/2020  9:44 AM CDT -----  Results reviewed. Please advise labs are stable.

## 2020-09-15 ENCOUNTER — TELEPHONE (OUTPATIENT)
Dept: ENDOCRINOLOGY | Facility: CLINIC | Age: 65
End: 2020-09-15

## 2020-09-15 PROBLEM — M81.0 AGE-RELATED OSTEOPOROSIS WITHOUT CURRENT PATHOLOGICAL FRACTURE: Status: ACTIVE | Noted: 2019-01-02

## 2020-09-15 PROBLEM — M81.0 AGE-RELATED OSTEOPOROSIS WITHOUT CURRENT PATHOLOGICAL FRACTURE: Chronic | Status: ACTIVE | Noted: 2019-01-02

## 2020-12-01 ENCOUNTER — PATIENT MESSAGE (OUTPATIENT)
Dept: TRANSPLANT | Facility: CLINIC | Age: 65
End: 2020-12-01

## 2020-12-07 ENCOUNTER — LAB VISIT (OUTPATIENT)
Dept: LAB | Facility: HOSPITAL | Age: 65
End: 2020-12-07
Payer: MEDICAID

## 2020-12-07 DIAGNOSIS — Z94.4 LIVER REPLACED BY TRANSPLANT: ICD-10-CM

## 2020-12-07 LAB
ALBUMIN SERPL BCP-MCNC: 3.9 G/DL (ref 3.5–5.2)
ALP SERPL-CCNC: 86 U/L (ref 55–135)
ALT SERPL W/O P-5'-P-CCNC: 14 U/L (ref 10–44)
ANION GAP SERPL CALC-SCNC: 7 MMOL/L (ref 8–16)
AST SERPL-CCNC: 21 U/L (ref 10–40)
BASOPHILS # BLD AUTO: 0.02 K/UL (ref 0–0.2)
BASOPHILS NFR BLD: 0.3 % (ref 0–1.9)
BILIRUB SERPL-MCNC: 0.9 MG/DL (ref 0.1–1)
BUN SERPL-MCNC: 20 MG/DL (ref 8–23)
CALCIUM SERPL-MCNC: 9 MG/DL (ref 8.7–10.5)
CHLORIDE SERPL-SCNC: 105 MMOL/L (ref 95–110)
CO2 SERPL-SCNC: 27 MMOL/L (ref 23–29)
CREAT SERPL-MCNC: 1.1 MG/DL (ref 0.5–1.4)
DIFFERENTIAL METHOD: ABNORMAL
EOSINOPHIL # BLD AUTO: 0.2 K/UL (ref 0–0.5)
EOSINOPHIL NFR BLD: 3.1 % (ref 0–8)
ERYTHROCYTE [DISTWIDTH] IN BLOOD BY AUTOMATED COUNT: 11.9 % (ref 11.5–14.5)
EST. GFR  (AFRICAN AMERICAN): >60 ML/MIN/1.73 M^2
EST. GFR  (NON AFRICAN AMERICAN): >60 ML/MIN/1.73 M^2
GLUCOSE SERPL-MCNC: 87 MG/DL (ref 70–110)
HCT VFR BLD AUTO: 40.7 % (ref 40–54)
HGB BLD-MCNC: 13.4 G/DL (ref 14–18)
IMM GRANULOCYTES # BLD AUTO: 0.01 K/UL (ref 0–0.04)
IMM GRANULOCYTES NFR BLD AUTO: 0.2 % (ref 0–0.5)
LYMPHOCYTES # BLD AUTO: 1.2 K/UL (ref 1–4.8)
LYMPHOCYTES NFR BLD: 18 % (ref 18–48)
MCH RBC QN AUTO: 34.7 PG (ref 27–31)
MCHC RBC AUTO-ENTMCNC: 32.9 G/DL (ref 32–36)
MCV RBC AUTO: 105 FL (ref 82–98)
MONOCYTES # BLD AUTO: 0.5 K/UL (ref 0.3–1)
MONOCYTES NFR BLD: 8.2 % (ref 4–15)
NEUTROPHILS # BLD AUTO: 4.5 K/UL (ref 1.8–7.7)
NEUTROPHILS NFR BLD: 70.2 % (ref 38–73)
NRBC BLD-RTO: 0 /100 WBC
PLATELET # BLD AUTO: 133 K/UL (ref 150–350)
PMV BLD AUTO: 13 FL (ref 9.2–12.9)
POTASSIUM SERPL-SCNC: 4.8 MMOL/L (ref 3.5–5.1)
PROT SERPL-MCNC: 7.1 G/DL (ref 6–8.4)
RBC # BLD AUTO: 3.86 M/UL (ref 4.6–6.2)
SODIUM SERPL-SCNC: 139 MMOL/L (ref 136–145)
WBC # BLD AUTO: 6.44 K/UL (ref 3.9–12.7)

## 2020-12-07 PROCEDURE — 80053 COMPREHEN METABOLIC PANEL: CPT

## 2020-12-07 PROCEDURE — 85025 COMPLETE CBC W/AUTO DIFF WBC: CPT

## 2020-12-07 PROCEDURE — 36415 COLL VENOUS BLD VENIPUNCTURE: CPT | Mod: PO

## 2020-12-07 PROCEDURE — 87517 HEPATITIS B DNA QUANT: CPT

## 2020-12-07 PROCEDURE — 87340 HEPATITIS B SURFACE AG IA: CPT

## 2020-12-07 PROCEDURE — 80197 ASSAY OF TACROLIMUS: CPT

## 2020-12-08 ENCOUNTER — TELEPHONE (OUTPATIENT)
Dept: TRANSPLANT | Facility: CLINIC | Age: 65
End: 2020-12-08

## 2020-12-08 DIAGNOSIS — Z94.4 LIVER REPLACED BY TRANSPLANT: Primary | ICD-10-CM

## 2020-12-08 LAB — TACROLIMUS BLD-MCNC: 5.7 NG/ML (ref 5–15)

## 2020-12-08 NOTE — LETTER
December 8, 2020    Mario Grier  [address]  [place] [address]          Dear Mario Grier:  MRN: [id number]    This is a follow up to your recent labs, your lab results were stable.  There are no medicine changes.  Please have your labs drawn again on 3/8/21.      If you cannot have your labs drawn on the scheduled date, it is your responsibility to call the transplant department to reschedule.  Please call [phone] and ask to speak to Shari PRIETO -  for all scheduling requests.     Sincerely,    Marybeth Ruiz, RN,BSN,CCTC    Your Liver Transplant Coordinator    [facility]  [address] [place] [address] [phone]

## 2020-12-08 NOTE — TELEPHONE ENCOUNTER
Labs reviewed and are stable, continue q 3 months. Letter sent.       ----- Message from Jim Ball MD sent at 12/8/2020  1:49 PM CST -----  Results reviewed. Please advise labs are stable.

## 2020-12-09 LAB — HBV SURFACE AG SERPL QL IA: NEGATIVE

## 2020-12-22 ENCOUNTER — TELEPHONE (OUTPATIENT)
Dept: RADIOLOGY | Facility: HOSPITAL | Age: 65
End: 2020-12-22

## 2020-12-30 ENCOUNTER — TELEPHONE (OUTPATIENT)
Dept: RADIOLOGY | Facility: HOSPITAL | Age: 65
End: 2020-12-30

## 2021-01-06 ENCOUNTER — PATIENT MESSAGE (OUTPATIENT)
Dept: HEPATOLOGY | Facility: CLINIC | Age: 66
End: 2021-01-06

## 2021-01-06 ENCOUNTER — PATIENT MESSAGE (OUTPATIENT)
Dept: TRANSPLANT | Facility: CLINIC | Age: 66
End: 2021-01-06

## 2021-01-08 ENCOUNTER — PATIENT MESSAGE (OUTPATIENT)
Dept: TRANSPLANT | Facility: CLINIC | Age: 66
End: 2021-01-08

## 2021-01-21 ENCOUNTER — TELEPHONE (OUTPATIENT)
Dept: TRANSPLANT | Facility: CLINIC | Age: 66
End: 2021-01-21

## 2021-01-27 ENCOUNTER — TELEPHONE (OUTPATIENT)
Dept: RADIOLOGY | Facility: HOSPITAL | Age: 66
End: 2021-01-27

## 2021-01-28 ENCOUNTER — HOSPITAL ENCOUNTER (OUTPATIENT)
Dept: RADIOLOGY | Facility: HOSPITAL | Age: 66
Discharge: HOME | End: 2021-01-28
Attending: INTERNAL MEDICINE
Payer: MEDICARE

## 2021-01-28 DIAGNOSIS — Z94.4 LIVER REPLACED BY TRANSPLANT: ICD-10-CM

## 2021-01-28 PROCEDURE — 93975 VASCULAR STUDY: CPT | Mod: TC,PO

## 2021-01-28 PROCEDURE — 93975 US LIVER TRANSPLANT POST: ICD-10-PCS | Mod: 26,,, | Performed by: RADIOLOGY

## 2021-01-28 PROCEDURE — 76705 ECHO EXAM OF ABDOMEN: CPT | Mod: 26,59,, | Performed by: RADIOLOGY

## 2021-01-28 PROCEDURE — 76705 US LIVER TRANSPLANT POST: ICD-10-PCS | Mod: 26,59,, | Performed by: RADIOLOGY

## 2021-01-28 PROCEDURE — 93975 VASCULAR STUDY: CPT | Mod: 26,,, | Performed by: RADIOLOGY

## 2021-02-04 ENCOUNTER — TELEPHONE (OUTPATIENT)
Dept: TRANSPLANT | Facility: CLINIC | Age: 66
End: 2021-02-04

## 2021-02-04 DIAGNOSIS — Z94.4 LIVER REPLACED BY TRANSPLANT: Primary | ICD-10-CM

## 2021-02-04 DIAGNOSIS — Z85.05 PERSONAL HISTORY OF LIVER CANCER: ICD-10-CM

## 2021-02-25 ENCOUNTER — OFFICE VISIT (OUTPATIENT)
Dept: ENDOCRINOLOGY | Facility: CLINIC | Age: 66
End: 2021-02-25
Payer: MEDICARE

## 2021-02-25 VITALS
OXYGEN SATURATION: 97 % | SYSTOLIC BLOOD PRESSURE: 131 MMHG | WEIGHT: 127 LBS | HEART RATE: 61 BPM | HEIGHT: 66 IN | BODY MASS INDEX: 20.41 KG/M2 | DIASTOLIC BLOOD PRESSURE: 84 MMHG

## 2021-02-25 DIAGNOSIS — M81.6 LOCALIZED OSTEOPOROSIS WITHOUT CURRENT PATHOLOGICAL FRACTURE: ICD-10-CM

## 2021-02-25 DIAGNOSIS — E05.00 GRAVES' DISEASE WITH EXOPHTHALMOS: ICD-10-CM

## 2021-02-25 PROCEDURE — 99204 OFFICE O/P NEW MOD 45 MIN: CPT | Mod: S$GLB,,, | Performed by: INTERNAL MEDICINE

## 2021-02-25 PROCEDURE — 1125F AMNT PAIN NOTED PAIN PRSNT: CPT | Mod: S$GLB,,, | Performed by: INTERNAL MEDICINE

## 2021-02-25 PROCEDURE — 99999 PR PBB SHADOW E&M-EST. PATIENT-LVL IV: ICD-10-PCS | Mod: PBBFAC,,, | Performed by: INTERNAL MEDICINE

## 2021-02-25 PROCEDURE — 1125F PR PAIN SEVERITY QUANTIFIED, PAIN PRESENT: ICD-10-PCS | Mod: S$GLB,,, | Performed by: INTERNAL MEDICINE

## 2021-02-25 PROCEDURE — 99204 PR OFFICE/OUTPT VISIT, NEW, LEVL IV, 45-59 MIN: ICD-10-PCS | Mod: S$GLB,,, | Performed by: INTERNAL MEDICINE

## 2021-02-25 PROCEDURE — 3008F PR BODY MASS INDEX (BMI) DOCUMENTED: ICD-10-PCS | Mod: CPTII,S$GLB,, | Performed by: INTERNAL MEDICINE

## 2021-02-25 PROCEDURE — 3008F BODY MASS INDEX DOCD: CPT | Mod: CPTII,S$GLB,, | Performed by: INTERNAL MEDICINE

## 2021-02-25 PROCEDURE — 3288F FALL RISK ASSESSMENT DOCD: CPT | Mod: CPTII,S$GLB,, | Performed by: INTERNAL MEDICINE

## 2021-02-25 PROCEDURE — 1101F PR PT FALLS ASSESS DOC 0-1 FALLS W/OUT INJ PAST YR: ICD-10-PCS | Mod: CPTII,S$GLB,, | Performed by: INTERNAL MEDICINE

## 2021-02-25 PROCEDURE — 1101F PT FALLS ASSESS-DOCD LE1/YR: CPT | Mod: CPTII,S$GLB,, | Performed by: INTERNAL MEDICINE

## 2021-02-25 PROCEDURE — 99999 PR PBB SHADOW E&M-EST. PATIENT-LVL IV: CPT | Mod: PBBFAC,,, | Performed by: INTERNAL MEDICINE

## 2021-02-25 PROCEDURE — 1157F ADVNC CARE PLAN IN RCRD: CPT | Mod: S$GLB,,, | Performed by: INTERNAL MEDICINE

## 2021-02-25 PROCEDURE — 1157F PR ADVANCE CARE PLAN OR EQUIV PRESENT IN MEDICAL RECORD: ICD-10-PCS | Mod: S$GLB,,, | Performed by: INTERNAL MEDICINE

## 2021-02-25 PROCEDURE — 3288F PR FALLS RISK ASSESSMENT DOCUMENTED: ICD-10-PCS | Mod: CPTII,S$GLB,, | Performed by: INTERNAL MEDICINE

## 2021-03-01 ENCOUNTER — OFFICE VISIT (OUTPATIENT)
Dept: TRANSPLANT | Facility: CLINIC | Age: 66
End: 2021-03-01
Attending: INTERNAL MEDICINE
Payer: MEDICARE

## 2021-03-01 DIAGNOSIS — Z94.4 LIVER REPLACED BY TRANSPLANT: Primary | ICD-10-CM

## 2021-03-01 DIAGNOSIS — Z29.89 PROPHYLACTIC IMMUNOTHERAPY: Chronic | ICD-10-CM

## 2021-03-01 PROCEDURE — 1157F PR ADVANCE CARE PLAN OR EQUIV PRESENT IN MEDICAL RECORD: ICD-10-PCS | Mod: 95,,, | Performed by: INTERNAL MEDICINE

## 2021-03-01 PROCEDURE — 99213 OFFICE O/P EST LOW 20 MIN: CPT | Mod: 95,,, | Performed by: INTERNAL MEDICINE

## 2021-03-01 PROCEDURE — 99213 PR OFFICE/OUTPT VISIT, EST, LEVL III, 20-29 MIN: ICD-10-PCS | Mod: 95,,, | Performed by: INTERNAL MEDICINE

## 2021-03-01 PROCEDURE — 1157F ADVNC CARE PLAN IN RCRD: CPT | Mod: 95,,, | Performed by: INTERNAL MEDICINE

## 2021-03-08 ENCOUNTER — LAB VISIT (OUTPATIENT)
Dept: LAB | Facility: HOSPITAL | Age: 66
End: 2021-03-08
Attending: INTERNAL MEDICINE
Payer: MEDICAID

## 2021-03-08 DIAGNOSIS — Z94.4 LIVER REPLACED BY TRANSPLANT: ICD-10-CM

## 2021-03-08 LAB
ALBUMIN SERPL BCP-MCNC: 3.8 G/DL (ref 3.5–5.2)
ALP SERPL-CCNC: 60 U/L (ref 55–135)
ALT SERPL W/O P-5'-P-CCNC: 12 U/L (ref 10–44)
ANION GAP SERPL CALC-SCNC: 6 MMOL/L (ref 8–16)
AST SERPL-CCNC: 18 U/L (ref 10–40)
BASOPHILS # BLD AUTO: 0.02 K/UL (ref 0–0.2)
BASOPHILS NFR BLD: 0.4 % (ref 0–1.9)
BILIRUB SERPL-MCNC: 0.8 MG/DL (ref 0.1–1)
BUN SERPL-MCNC: 22 MG/DL (ref 8–23)
CALCIUM SERPL-MCNC: 8.7 MG/DL (ref 8.7–10.5)
CHLORIDE SERPL-SCNC: 106 MMOL/L (ref 95–110)
CO2 SERPL-SCNC: 25 MMOL/L (ref 23–29)
CREAT SERPL-MCNC: 1.2 MG/DL (ref 0.5–1.4)
DIFFERENTIAL METHOD: ABNORMAL
EOSINOPHIL # BLD AUTO: 0.2 K/UL (ref 0–0.5)
EOSINOPHIL NFR BLD: 3.8 % (ref 0–8)
ERYTHROCYTE [DISTWIDTH] IN BLOOD BY AUTOMATED COUNT: 12.1 % (ref 11.5–14.5)
EST. GFR  (AFRICAN AMERICAN): >60 ML/MIN/1.73 M^2
EST. GFR  (NON AFRICAN AMERICAN): >60 ML/MIN/1.73 M^2
GLUCOSE SERPL-MCNC: 91 MG/DL (ref 70–110)
HCT VFR BLD AUTO: 41.1 % (ref 40–54)
HGB BLD-MCNC: 13.8 G/DL (ref 14–18)
IMM GRANULOCYTES # BLD AUTO: 0.02 K/UL (ref 0–0.04)
IMM GRANULOCYTES NFR BLD AUTO: 0.4 % (ref 0–0.5)
INR PPP: 1 (ref 0.8–1.2)
LYMPHOCYTES # BLD AUTO: 1.1 K/UL (ref 1–4.8)
LYMPHOCYTES NFR BLD: 19.4 % (ref 18–48)
MCH RBC QN AUTO: 34.4 PG (ref 27–31)
MCHC RBC AUTO-ENTMCNC: 33.6 G/DL (ref 32–36)
MCV RBC AUTO: 103 FL (ref 82–98)
MONOCYTES # BLD AUTO: 0.4 K/UL (ref 0.3–1)
MONOCYTES NFR BLD: 7.5 % (ref 4–15)
NEUTROPHILS # BLD AUTO: 3.8 K/UL (ref 1.8–7.7)
NEUTROPHILS NFR BLD: 68.5 % (ref 38–73)
NRBC BLD-RTO: 0 /100 WBC
PLATELET # BLD AUTO: 161 K/UL (ref 150–350)
PMV BLD AUTO: 12.3 FL (ref 9.2–12.9)
POTASSIUM SERPL-SCNC: 4.7 MMOL/L (ref 3.5–5.1)
PROT SERPL-MCNC: 7 G/DL (ref 6–8.4)
PROTHROMBIN TIME: 11.1 SEC (ref 9–12.5)
RBC # BLD AUTO: 4.01 M/UL (ref 4.6–6.2)
SODIUM SERPL-SCNC: 137 MMOL/L (ref 136–145)
WBC # BLD AUTO: 5.47 K/UL (ref 3.9–12.7)

## 2021-03-08 PROCEDURE — 80197 ASSAY OF TACROLIMUS: CPT | Performed by: INTERNAL MEDICINE

## 2021-03-08 PROCEDURE — 85610 PROTHROMBIN TIME: CPT | Performed by: INTERNAL MEDICINE

## 2021-03-08 PROCEDURE — 80053 COMPREHEN METABOLIC PANEL: CPT | Performed by: INTERNAL MEDICINE

## 2021-03-08 PROCEDURE — 85025 COMPLETE CBC W/AUTO DIFF WBC: CPT | Performed by: INTERNAL MEDICINE

## 2021-03-08 PROCEDURE — 87517 HEPATITIS B DNA QUANT: CPT | Performed by: INTERNAL MEDICINE

## 2021-03-08 PROCEDURE — 87340 HEPATITIS B SURFACE AG IA: CPT | Performed by: INTERNAL MEDICINE

## 2021-03-08 PROCEDURE — 82105 ALPHA-FETOPROTEIN SERUM: CPT | Performed by: INTERNAL MEDICINE

## 2021-03-09 ENCOUNTER — TELEPHONE (OUTPATIENT)
Dept: TRANSPLANT | Facility: CLINIC | Age: 66
End: 2021-03-09

## 2021-03-09 LAB
AFP SERPL-MCNC: 1.6 NG/ML (ref 0–8.4)
HBV SURFACE AG SERPL QL IA: NEGATIVE
TACROLIMUS BLD-MCNC: 4.2 NG/ML (ref 5–15)

## 2021-03-12 ENCOUNTER — LAB VISIT (OUTPATIENT)
Dept: LAB | Facility: HOSPITAL | Age: 66
End: 2021-03-12
Attending: INTERNAL MEDICINE
Payer: MEDICARE

## 2021-03-12 DIAGNOSIS — M81.6 LOCALIZED OSTEOPOROSIS WITHOUT CURRENT PATHOLOGICAL FRACTURE: ICD-10-CM

## 2021-03-12 PROCEDURE — 82570 ASSAY OF URINE CREATININE: CPT | Performed by: INTERNAL MEDICINE

## 2021-03-13 LAB
CREAT 24H UR-MRATE: 55.3 MG/HR (ref 40–75)
CREAT UR-MCNC: 118 MG/DL (ref 23–375)
CREATININE, URINE (MG/SPEC): 1327.5 MG/SPEC
URINE COLLECTION DURATION: 24 HR
URINE VOLUME: 1125 ML

## 2021-06-07 ENCOUNTER — LAB VISIT (OUTPATIENT)
Dept: LAB | Facility: HOSPITAL | Age: 66
End: 2021-06-07
Attending: INTERNAL MEDICINE
Payer: MEDICARE

## 2021-06-07 DIAGNOSIS — Z94.4 LIVER REPLACED BY TRANSPLANT: ICD-10-CM

## 2021-06-07 LAB
BASOPHILS # BLD AUTO: 0.02 K/UL (ref 0–0.2)
BASOPHILS NFR BLD: 0.3 % (ref 0–1.9)
DIFFERENTIAL METHOD: ABNORMAL
EOSINOPHIL # BLD AUTO: 0.3 K/UL (ref 0–0.5)
EOSINOPHIL NFR BLD: 4.7 % (ref 0–8)
ERYTHROCYTE [DISTWIDTH] IN BLOOD BY AUTOMATED COUNT: 12.5 % (ref 11.5–14.5)
HCT VFR BLD AUTO: 39.2 % (ref 40–54)
HGB BLD-MCNC: 13 G/DL (ref 14–18)
IMM GRANULOCYTES # BLD AUTO: 0.02 K/UL (ref 0–0.04)
IMM GRANULOCYTES NFR BLD AUTO: 0.3 % (ref 0–0.5)
INR PPP: 1 (ref 0.8–1.2)
LYMPHOCYTES # BLD AUTO: 1.2 K/UL (ref 1–4.8)
LYMPHOCYTES NFR BLD: 20.3 % (ref 18–48)
MCH RBC QN AUTO: 34.3 PG (ref 27–31)
MCHC RBC AUTO-ENTMCNC: 33.2 G/DL (ref 32–36)
MCV RBC AUTO: 103 FL (ref 82–98)
MONOCYTES # BLD AUTO: 0.4 K/UL (ref 0.3–1)
MONOCYTES NFR BLD: 7.5 % (ref 4–15)
NEUTROPHILS # BLD AUTO: 3.9 K/UL (ref 1.8–7.7)
NEUTROPHILS NFR BLD: 66.9 % (ref 38–73)
NRBC BLD-RTO: 0 /100 WBC
PLATELET # BLD AUTO: 180 K/UL (ref 150–450)
PMV BLD AUTO: 12.1 FL (ref 9.2–12.9)
PROTHROMBIN TIME: 10.9 SEC (ref 9–12.5)
RBC # BLD AUTO: 3.79 M/UL (ref 4.6–6.2)
WBC # BLD AUTO: 5.76 K/UL (ref 3.9–12.7)

## 2021-06-07 PROCEDURE — 85025 COMPLETE CBC W/AUTO DIFF WBC: CPT | Performed by: INTERNAL MEDICINE

## 2021-06-07 PROCEDURE — 87517 HEPATITIS B DNA QUANT: CPT | Performed by: INTERNAL MEDICINE

## 2021-06-07 PROCEDURE — 87340 HEPATITIS B SURFACE AG IA: CPT | Performed by: INTERNAL MEDICINE

## 2021-06-07 PROCEDURE — 80053 COMPREHEN METABOLIC PANEL: CPT | Performed by: INTERNAL MEDICINE

## 2021-06-07 PROCEDURE — 85610 PROTHROMBIN TIME: CPT | Performed by: INTERNAL MEDICINE

## 2021-06-07 PROCEDURE — 80197 ASSAY OF TACROLIMUS: CPT | Performed by: INTERNAL MEDICINE

## 2021-06-07 PROCEDURE — 36415 COLL VENOUS BLD VENIPUNCTURE: CPT | Mod: PO | Performed by: INTERNAL MEDICINE

## 2021-06-08 ENCOUNTER — TELEPHONE (OUTPATIENT)
Dept: TRANSPLANT | Facility: CLINIC | Age: 66
End: 2021-06-08

## 2021-06-08 DIAGNOSIS — Z85.05 PERSONAL HISTORY OF LIVER CANCER: ICD-10-CM

## 2021-06-08 DIAGNOSIS — Z94.4 LIVER REPLACED BY TRANSPLANT: Primary | ICD-10-CM

## 2021-06-08 LAB
ALBUMIN SERPL BCP-MCNC: 3.8 G/DL (ref 3.5–5.2)
ALP SERPL-CCNC: 59 U/L (ref 55–135)
ALT SERPL W/O P-5'-P-CCNC: 38 U/L (ref 10–44)
ANION GAP SERPL CALC-SCNC: 8 MMOL/L (ref 8–16)
AST SERPL-CCNC: 26 U/L (ref 10–40)
BILIRUB SERPL-MCNC: 0.4 MG/DL (ref 0.1–1)
BUN SERPL-MCNC: 16 MG/DL (ref 8–23)
CALCIUM SERPL-MCNC: 9.3 MG/DL (ref 8.7–10.5)
CHLORIDE SERPL-SCNC: 104 MMOL/L (ref 95–110)
CO2 SERPL-SCNC: 27 MMOL/L (ref 23–29)
CREAT SERPL-MCNC: 1.2 MG/DL (ref 0.5–1.4)
EST. GFR  (AFRICAN AMERICAN): >60 ML/MIN/1.73 M^2
EST. GFR  (NON AFRICAN AMERICAN): >60 ML/MIN/1.73 M^2
GLUCOSE SERPL-MCNC: 85 MG/DL (ref 70–110)
HBV SURFACE AG SERPL QL IA: NEGATIVE
POTASSIUM SERPL-SCNC: 4.7 MMOL/L (ref 3.5–5.1)
PROT SERPL-MCNC: 6.8 G/DL (ref 6–8.4)
SODIUM SERPL-SCNC: 139 MMOL/L (ref 136–145)
TACROLIMUS BLD-MCNC: 6.8 NG/ML (ref 5–15)
TACROLIMUS, NORMALIZED: 6.1 NG/ML (ref 5–15)

## 2021-06-14 ENCOUNTER — HOSPITAL ENCOUNTER (OUTPATIENT)
Dept: RADIOLOGY | Facility: HOSPITAL | Age: 66
Discharge: HOME | End: 2021-06-14
Attending: INTERNAL MEDICINE
Payer: MEDICARE

## 2021-06-14 DIAGNOSIS — Z94.4 LIVER REPLACED BY TRANSPLANT: ICD-10-CM

## 2021-06-14 DIAGNOSIS — Z85.05 PERSONAL HISTORY OF LIVER CANCER: ICD-10-CM

## 2021-06-14 PROCEDURE — 76705 US LIVER TRANSPLANT POST: ICD-10-PCS | Mod: 26,59,, | Performed by: RADIOLOGY

## 2021-06-14 PROCEDURE — 71260 CT CHEST ABDOMEN PELVIS WITH CONTRAST (XPD): ICD-10-PCS | Mod: 26,,, | Performed by: RADIOLOGY

## 2021-06-14 PROCEDURE — 93975 VASCULAR STUDY: CPT | Mod: 26,,, | Performed by: RADIOLOGY

## 2021-06-14 PROCEDURE — 74177 CT ABD & PELVIS W/CONTRAST: CPT | Mod: 26,,, | Performed by: RADIOLOGY

## 2021-06-14 PROCEDURE — 93975 VASCULAR STUDY: CPT | Mod: TC

## 2021-06-14 PROCEDURE — 74177 CT ABD & PELVIS W/CONTRAST: CPT | Mod: TC

## 2021-06-14 PROCEDURE — 93975 US LIVER TRANSPLANT POST: ICD-10-PCS | Mod: 26,,, | Performed by: RADIOLOGY

## 2021-06-14 PROCEDURE — 74177 CT CHEST ABDOMEN PELVIS WITH CONTRAST (XPD): ICD-10-PCS | Mod: 26,,, | Performed by: RADIOLOGY

## 2021-06-14 PROCEDURE — 71260 CT THORAX DX C+: CPT | Mod: 26,,, | Performed by: RADIOLOGY

## 2021-06-14 PROCEDURE — 76705 ECHO EXAM OF ABDOMEN: CPT | Mod: 26,59,, | Performed by: RADIOLOGY

## 2021-06-14 PROCEDURE — 25500020 PHARM REV CODE 255: Performed by: INTERNAL MEDICINE

## 2021-06-14 RX ADMIN — IOHEXOL 100 ML: 350 INJECTION, SOLUTION INTRAVENOUS at 11:06

## 2021-06-15 ENCOUNTER — TELEPHONE (OUTPATIENT)
Dept: TRANSPLANT | Facility: CLINIC | Age: 66
End: 2021-06-15

## 2021-06-15 DIAGNOSIS — Z94.4 LIVER REPLACED BY TRANSPLANT: Primary | ICD-10-CM

## 2021-09-29 ENCOUNTER — LAB VISIT (OUTPATIENT)
Dept: LAB | Facility: HOSPITAL | Age: 66
End: 2021-09-29
Attending: INTERNAL MEDICINE
Payer: MEDICARE

## 2021-09-29 DIAGNOSIS — Z85.05 PERSONAL HISTORY OF LIVER CANCER: ICD-10-CM

## 2021-09-29 DIAGNOSIS — Z94.4 LIVER REPLACED BY TRANSPLANT: ICD-10-CM

## 2021-09-29 LAB
AFP SERPL-MCNC: <2 NG/ML (ref 0–8.4)
ALBUMIN SERPL BCP-MCNC: 4.2 G/DL (ref 3.5–5.2)
ALP SERPL-CCNC: 81 U/L (ref 55–135)
ALT SERPL W/O P-5'-P-CCNC: 11 U/L (ref 10–44)
ANION GAP SERPL CALC-SCNC: 7 MMOL/L (ref 8–16)
AST SERPL-CCNC: 15 U/L (ref 10–40)
BASOPHILS # BLD AUTO: 0.03 K/UL (ref 0–0.2)
BASOPHILS NFR BLD: 0.6 % (ref 0–1.9)
BILIRUB SERPL-MCNC: 0.8 MG/DL (ref 0.1–1)
BUN SERPL-MCNC: 20 MG/DL (ref 8–23)
CALCIUM SERPL-MCNC: 10.1 MG/DL (ref 8.7–10.5)
CHLORIDE SERPL-SCNC: 102 MMOL/L (ref 95–110)
CO2 SERPL-SCNC: 26 MMOL/L (ref 23–29)
CREAT SERPL-MCNC: 1.2 MG/DL (ref 0.5–1.4)
DIFFERENTIAL METHOD: ABNORMAL
EOSINOPHIL # BLD AUTO: 0.2 K/UL (ref 0–0.5)
EOSINOPHIL NFR BLD: 3.9 % (ref 0–8)
ERYTHROCYTE [DISTWIDTH] IN BLOOD BY AUTOMATED COUNT: 12.7 % (ref 11.5–14.5)
EST. GFR  (AFRICAN AMERICAN): >60 ML/MIN/1.73 M^2
EST. GFR  (NON AFRICAN AMERICAN): >60 ML/MIN/1.73 M^2
GLUCOSE SERPL-MCNC: 92 MG/DL (ref 70–110)
HCT VFR BLD AUTO: 42.1 % (ref 40–54)
HGB BLD-MCNC: 14.4 G/DL (ref 14–18)
IMM GRANULOCYTES # BLD AUTO: 0.01 K/UL (ref 0–0.04)
IMM GRANULOCYTES NFR BLD AUTO: 0.2 % (ref 0–0.5)
INR PPP: 1 (ref 0.8–1.2)
LYMPHOCYTES # BLD AUTO: 1.4 K/UL (ref 1–4.8)
LYMPHOCYTES NFR BLD: 26.8 % (ref 18–48)
MCH RBC QN AUTO: 35 PG (ref 27–31)
MCHC RBC AUTO-ENTMCNC: 34.2 G/DL (ref 32–36)
MCV RBC AUTO: 102 FL (ref 82–98)
MONOCYTES # BLD AUTO: 0.5 K/UL (ref 0.3–1)
MONOCYTES NFR BLD: 9.4 % (ref 4–15)
NEUTROPHILS # BLD AUTO: 3 K/UL (ref 1.8–7.7)
NEUTROPHILS NFR BLD: 59.1 % (ref 38–73)
NRBC BLD-RTO: 0 /100 WBC
PLATELET # BLD AUTO: 161 K/UL (ref 150–450)
PMV BLD AUTO: 12.3 FL (ref 9.2–12.9)
POTASSIUM SERPL-SCNC: 4.6 MMOL/L (ref 3.5–5.1)
PROT SERPL-MCNC: 7.4 G/DL (ref 6–8.4)
PROTHROMBIN TIME: 11.1 SEC (ref 9–12.5)
RBC # BLD AUTO: 4.11 M/UL (ref 4.6–6.2)
SODIUM SERPL-SCNC: 135 MMOL/L (ref 136–145)
WBC # BLD AUTO: 5.11 K/UL (ref 3.9–12.7)

## 2021-09-29 PROCEDURE — 80197 ASSAY OF TACROLIMUS: CPT | Performed by: INTERNAL MEDICINE

## 2021-09-29 PROCEDURE — 82105 ALPHA-FETOPROTEIN SERUM: CPT | Performed by: INTERNAL MEDICINE

## 2021-09-29 PROCEDURE — 36415 COLL VENOUS BLD VENIPUNCTURE: CPT | Mod: PO | Performed by: INTERNAL MEDICINE

## 2021-09-29 PROCEDURE — 85025 COMPLETE CBC W/AUTO DIFF WBC: CPT | Performed by: INTERNAL MEDICINE

## 2021-09-29 PROCEDURE — 80053 COMPREHEN METABOLIC PANEL: CPT | Performed by: INTERNAL MEDICINE

## 2021-09-29 PROCEDURE — 85610 PROTHROMBIN TIME: CPT | Performed by: INTERNAL MEDICINE

## 2021-09-30 LAB
TACROLIMUS BLD-MCNC: 5.9 NG/ML (ref 5–15)
TACROLIMUS, NORMALIZED: 5.4 NG/ML (ref 5–15)

## 2021-10-01 ENCOUNTER — TELEPHONE (OUTPATIENT)
Dept: TRANSPLANT | Facility: CLINIC | Age: 66
End: 2021-10-01

## 2021-11-18 ENCOUNTER — LAB VISIT (OUTPATIENT)
Dept: LAB | Facility: HOSPITAL | Age: 66
End: 2021-11-18
Attending: INTERNAL MEDICINE
Payer: MEDICARE

## 2021-11-18 DIAGNOSIS — Z94.4 LIVER REPLACED BY TRANSPLANT: ICD-10-CM

## 2021-11-18 DIAGNOSIS — Z85.05 PERSONAL HISTORY OF LIVER CANCER: ICD-10-CM

## 2021-11-18 LAB
AFP SERPL-MCNC: <2 NG/ML (ref 0–8.4)
ALBUMIN SERPL BCP-MCNC: 4 G/DL (ref 3.5–5.2)
ALP SERPL-CCNC: 81 U/L (ref 55–135)
ALT SERPL W/O P-5'-P-CCNC: 13 U/L (ref 10–44)
ANION GAP SERPL CALC-SCNC: 6 MMOL/L (ref 8–16)
AST SERPL-CCNC: 17 U/L (ref 10–40)
BASOPHILS # BLD AUTO: 0.03 K/UL (ref 0–0.2)
BASOPHILS NFR BLD: 0.6 % (ref 0–1.9)
BILIRUB SERPL-MCNC: 0.5 MG/DL (ref 0.1–1)
BUN SERPL-MCNC: 27 MG/DL (ref 8–23)
CALCIUM SERPL-MCNC: 9.2 MG/DL (ref 8.7–10.5)
CHLORIDE SERPL-SCNC: 99 MMOL/L (ref 95–110)
CO2 SERPL-SCNC: 32 MMOL/L (ref 23–29)
CREAT SERPL-MCNC: 1.2 MG/DL (ref 0.5–1.4)
DIFFERENTIAL METHOD: ABNORMAL
EOSINOPHIL # BLD AUTO: 0.4 K/UL (ref 0–0.5)
EOSINOPHIL NFR BLD: 7 % (ref 0–8)
ERYTHROCYTE [DISTWIDTH] IN BLOOD BY AUTOMATED COUNT: 12.6 % (ref 11.5–14.5)
EST. GFR  (AFRICAN AMERICAN): >60 ML/MIN/1.73 M^2
EST. GFR  (NON AFRICAN AMERICAN): >60 ML/MIN/1.73 M^2
GLUCOSE SERPL-MCNC: 92 MG/DL (ref 70–110)
HCT VFR BLD AUTO: 38.6 % (ref 40–54)
HGB BLD-MCNC: 13 G/DL (ref 14–18)
IMM GRANULOCYTES # BLD AUTO: 0.01 K/UL (ref 0–0.04)
IMM GRANULOCYTES NFR BLD AUTO: 0.2 % (ref 0–0.5)
INR PPP: 1 (ref 0.8–1.2)
LYMPHOCYTES # BLD AUTO: 1.3 K/UL (ref 1–4.8)
LYMPHOCYTES NFR BLD: 26 % (ref 18–48)
MCH RBC QN AUTO: 34.7 PG (ref 27–31)
MCHC RBC AUTO-ENTMCNC: 33.7 G/DL (ref 32–36)
MCV RBC AUTO: 103 FL (ref 82–98)
MONOCYTES # BLD AUTO: 0.6 K/UL (ref 0.3–1)
MONOCYTES NFR BLD: 11.3 % (ref 4–15)
NEUTROPHILS # BLD AUTO: 2.8 K/UL (ref 1.8–7.7)
NEUTROPHILS NFR BLD: 54.9 % (ref 38–73)
NRBC BLD-RTO: 0 /100 WBC
PLATELET # BLD AUTO: 149 K/UL (ref 150–450)
PMV BLD AUTO: 12.3 FL (ref 9.2–12.9)
POTASSIUM SERPL-SCNC: 4.5 MMOL/L (ref 3.5–5.1)
PROT SERPL-MCNC: 7.3 G/DL (ref 6–8.4)
PROTHROMBIN TIME: 10.6 SEC (ref 9–12.5)
RBC # BLD AUTO: 3.75 M/UL (ref 4.6–6.2)
SODIUM SERPL-SCNC: 137 MMOL/L (ref 136–145)
WBC # BLD AUTO: 5.12 K/UL (ref 3.9–12.7)

## 2021-11-18 PROCEDURE — 36415 COLL VENOUS BLD VENIPUNCTURE: CPT | Mod: PO | Performed by: INTERNAL MEDICINE

## 2021-11-18 PROCEDURE — 82105 ALPHA-FETOPROTEIN SERUM: CPT | Performed by: INTERNAL MEDICINE

## 2021-11-18 PROCEDURE — 85025 COMPLETE CBC W/AUTO DIFF WBC: CPT | Performed by: INTERNAL MEDICINE

## 2021-11-18 PROCEDURE — 85610 PROTHROMBIN TIME: CPT | Performed by: INTERNAL MEDICINE

## 2021-11-18 PROCEDURE — 80197 ASSAY OF TACROLIMUS: CPT | Performed by: INTERNAL MEDICINE

## 2021-11-18 PROCEDURE — 80053 COMPREHEN METABOLIC PANEL: CPT | Performed by: INTERNAL MEDICINE

## 2021-11-19 LAB — TACROLIMUS BLD-MCNC: 9.5 NG/ML (ref 5–15)

## 2021-11-20 ENCOUNTER — PATIENT MESSAGE (OUTPATIENT)
Dept: TRANSPLANT | Facility: CLINIC | Age: 66
End: 2021-11-20
Payer: MEDICARE

## 2021-11-22 ENCOUNTER — TELEPHONE (OUTPATIENT)
Dept: TRANSPLANT | Facility: CLINIC | Age: 66
End: 2021-11-22
Payer: MEDICARE

## 2021-12-22 ENCOUNTER — PATIENT MESSAGE (OUTPATIENT)
Dept: TRANSPLANT | Facility: CLINIC | Age: 66
End: 2021-12-22
Payer: MEDICARE

## 2022-01-18 ENCOUNTER — PATIENT MESSAGE (OUTPATIENT)
Dept: TRANSPLANT | Facility: CLINIC | Age: 67
End: 2022-01-18
Payer: MEDICARE

## 2022-02-10 ENCOUNTER — HOSPITAL ENCOUNTER (OUTPATIENT)
Dept: RADIOLOGY | Facility: HOSPITAL | Age: 67
Discharge: HOME | End: 2022-02-10
Attending: INTERNAL MEDICINE
Payer: MEDICARE

## 2022-02-10 DIAGNOSIS — Z94.4 LIVER REPLACED BY TRANSPLANT: ICD-10-CM

## 2022-02-10 PROCEDURE — 76705 ECHO EXAM OF ABDOMEN: CPT | Mod: 26,XS,, | Performed by: INTERNAL MEDICINE

## 2022-02-10 PROCEDURE — 93976 US LIVER TRANSPLANT POST: ICD-10-PCS | Mod: 26,,, | Performed by: INTERNAL MEDICINE

## 2022-02-10 PROCEDURE — 93976 VASCULAR STUDY: CPT | Mod: 26,,, | Performed by: INTERNAL MEDICINE

## 2022-02-10 PROCEDURE — 76705 US LIVER TRANSPLANT POST: ICD-10-PCS | Mod: 26,XS,, | Performed by: INTERNAL MEDICINE

## 2022-02-10 PROCEDURE — 76705 ECHO EXAM OF ABDOMEN: CPT | Mod: TC

## 2022-02-10 PROCEDURE — 93976 VASCULAR STUDY: CPT | Mod: TC

## 2022-02-11 ENCOUNTER — PATIENT MESSAGE (OUTPATIENT)
Dept: TRANSPLANT | Facility: CLINIC | Age: 67
End: 2022-02-11
Payer: MEDICARE

## 2022-02-14 ENCOUNTER — TELEPHONE (OUTPATIENT)
Dept: TRANSPLANT | Facility: CLINIC | Age: 67
End: 2022-02-14
Payer: MEDICARE

## 2022-02-14 ENCOUNTER — PATIENT MESSAGE (OUTPATIENT)
Dept: TRANSPLANT | Facility: CLINIC | Age: 67
End: 2022-02-14
Payer: MEDICARE

## 2022-02-14 DIAGNOSIS — Z85.05 PERSONAL HISTORY OF LIVER CANCER: ICD-10-CM

## 2022-02-14 DIAGNOSIS — Z94.4 LIVER REPLACED BY TRANSPLANT: Primary | ICD-10-CM

## 2022-02-14 NOTE — TELEPHONE ENCOUNTER
Notified pt liver ultrasound reviewed and is stable, will repeat in 6 months.       ----- Message from Jim Ball MD sent at 2/11/2022  9:31 AM CST -----  Results reviewed. No action.

## 2022-02-21 ENCOUNTER — PATIENT MESSAGE (OUTPATIENT)
Dept: TRANSPLANT | Facility: CLINIC | Age: 67
End: 2022-02-21
Payer: MEDICARE

## 2022-02-21 ENCOUNTER — LAB VISIT (OUTPATIENT)
Dept: LAB | Facility: HOSPITAL | Age: 67
End: 2022-02-21
Attending: INTERNAL MEDICINE
Payer: MEDICARE

## 2022-02-21 DIAGNOSIS — Z94.4 LIVER REPLACED BY TRANSPLANT: ICD-10-CM

## 2022-02-21 DIAGNOSIS — Z85.05 PERSONAL HISTORY OF LIVER CANCER: ICD-10-CM

## 2022-02-21 LAB
ALBUMIN SERPL BCP-MCNC: 4 G/DL (ref 3.5–5.2)
ALP SERPL-CCNC: 103 U/L (ref 55–135)
ALT SERPL W/O P-5'-P-CCNC: 31 U/L (ref 10–44)
ANION GAP SERPL CALC-SCNC: 8 MMOL/L (ref 8–16)
AST SERPL-CCNC: 19 U/L (ref 10–40)
BASOPHILS # BLD AUTO: 0.02 K/UL (ref 0–0.2)
BASOPHILS NFR BLD: 0.4 % (ref 0–1.9)
BILIRUB SERPL-MCNC: 0.9 MG/DL (ref 0.1–1)
BUN SERPL-MCNC: 15 MG/DL (ref 8–23)
CALCIUM SERPL-MCNC: 9.5 MG/DL (ref 8.7–10.5)
CHLORIDE SERPL-SCNC: 101 MMOL/L (ref 95–110)
CO2 SERPL-SCNC: 28 MMOL/L (ref 23–29)
CREAT SERPL-MCNC: 1.1 MG/DL (ref 0.5–1.4)
DIFFERENTIAL METHOD: ABNORMAL
EOSINOPHIL # BLD AUTO: 0.4 K/UL (ref 0–0.5)
EOSINOPHIL NFR BLD: 6.9 % (ref 0–8)
ERYTHROCYTE [DISTWIDTH] IN BLOOD BY AUTOMATED COUNT: 12.2 % (ref 11.5–14.5)
EST. GFR  (AFRICAN AMERICAN): >60 ML/MIN/1.73 M^2
EST. GFR  (NON AFRICAN AMERICAN): >60 ML/MIN/1.73 M^2
GLUCOSE SERPL-MCNC: 87 MG/DL (ref 70–110)
HCT VFR BLD AUTO: 42.6 % (ref 40–54)
HGB BLD-MCNC: 14.4 G/DL (ref 14–18)
IMM GRANULOCYTES # BLD AUTO: 0.01 K/UL (ref 0–0.04)
IMM GRANULOCYTES NFR BLD AUTO: 0.2 % (ref 0–0.5)
INR PPP: 1.1 (ref 0.8–1.2)
LYMPHOCYTES # BLD AUTO: 1.4 K/UL (ref 1–4.8)
LYMPHOCYTES NFR BLD: 27.9 % (ref 18–48)
MCH RBC QN AUTO: 34.7 PG (ref 27–31)
MCHC RBC AUTO-ENTMCNC: 33.8 G/DL (ref 32–36)
MCV RBC AUTO: 103 FL (ref 82–98)
MONOCYTES # BLD AUTO: 0.5 K/UL (ref 0.3–1)
MONOCYTES NFR BLD: 9.9 % (ref 4–15)
NEUTROPHILS # BLD AUTO: 2.8 K/UL (ref 1.8–7.7)
NEUTROPHILS NFR BLD: 54.7 % (ref 38–73)
NRBC BLD-RTO: 0 /100 WBC
PLATELET # BLD AUTO: 182 K/UL (ref 150–450)
PMV BLD AUTO: 12 FL (ref 9.2–12.9)
POTASSIUM SERPL-SCNC: 5.3 MMOL/L (ref 3.5–5.1)
PROT SERPL-MCNC: 7.1 G/DL (ref 6–8.4)
PROTHROMBIN TIME: 10.9 SEC (ref 9–12.5)
RBC # BLD AUTO: 4.15 M/UL (ref 4.6–6.2)
SODIUM SERPL-SCNC: 137 MMOL/L (ref 136–145)
WBC # BLD AUTO: 5.05 K/UL (ref 3.9–12.7)

## 2022-02-21 PROCEDURE — 80197 ASSAY OF TACROLIMUS: CPT | Performed by: INTERNAL MEDICINE

## 2022-02-21 PROCEDURE — 36415 COLL VENOUS BLD VENIPUNCTURE: CPT | Mod: PO | Performed by: INTERNAL MEDICINE

## 2022-02-21 PROCEDURE — 85025 COMPLETE CBC W/AUTO DIFF WBC: CPT | Performed by: INTERNAL MEDICINE

## 2022-02-21 PROCEDURE — 85610 PROTHROMBIN TIME: CPT | Performed by: INTERNAL MEDICINE

## 2022-02-21 PROCEDURE — 87517 HEPATITIS B DNA QUANT: CPT | Performed by: INTERNAL MEDICINE

## 2022-02-21 PROCEDURE — 87340 HEPATITIS B SURFACE AG IA: CPT | Performed by: INTERNAL MEDICINE

## 2022-02-21 PROCEDURE — 80053 COMPREHEN METABOLIC PANEL: CPT | Performed by: INTERNAL MEDICINE

## 2022-02-22 LAB — TACROLIMUS BLD-MCNC: 6.3 NG/ML (ref 5–15)

## 2022-02-23 LAB — HBV SURFACE AG SERPL QL IA: NEGATIVE

## 2022-02-28 ENCOUNTER — TELEPHONE (OUTPATIENT)
Dept: TRANSPLANT | Facility: CLINIC | Age: 67
End: 2022-02-28
Payer: MEDICARE

## 2022-02-28 NOTE — TELEPHONE ENCOUNTER
Labs reviewed and are stable, continue q 3 months. Letter sent.     ----- Message from Jim Ball MD sent at 2/25/2022 10:51 AM CST -----  Results reviewed. No action.

## 2022-02-28 NOTE — LETTER
February 28, 2022    Mario Grier  [address]  [place] [address]          Dear Mario Grier:  MRN: [id number]    This is a follow up to your recent labs, your lab results were stable.  There are no medicine changes.  Please have your labs drawn again on 5/23/22.      If you cannot have your labs drawn on the scheduled date, it is your responsibility to call the transplant department to reschedule.  Please call [phone] and ask to speak to Shari PRIETO -  for all scheduling requests.     Sincerely,    Marybeth Ruiz, RN,BSN,CCTC    Your Liver Transplant Coordinator    [facility]  [address] [place] [address] [phone]

## 2022-05-11 ENCOUNTER — PATIENT MESSAGE (OUTPATIENT)
Dept: RESEARCH | Facility: CLINIC | Age: 67
End: 2022-05-11
Payer: MEDICARE

## 2022-05-24 ENCOUNTER — PATIENT MESSAGE (OUTPATIENT)
Dept: TRANSPLANT | Facility: CLINIC | Age: 67
End: 2022-05-24
Payer: MEDICARE

## 2022-05-31 ENCOUNTER — LAB VISIT (OUTPATIENT)
Dept: LAB | Facility: HOSPITAL | Age: 67
End: 2022-05-31
Attending: INTERNAL MEDICINE
Payer: MEDICARE

## 2022-05-31 DIAGNOSIS — Z85.05 PERSONAL HISTORY OF LIVER CANCER: ICD-10-CM

## 2022-05-31 DIAGNOSIS — Z94.4 LIVER REPLACED BY TRANSPLANT: ICD-10-CM

## 2022-05-31 LAB
AFP SERPL-MCNC: <2 NG/ML (ref 0–8.4)
ALBUMIN SERPL BCP-MCNC: 3.8 G/DL (ref 3.5–5.2)
ALP SERPL-CCNC: 92 U/L (ref 55–135)
ALT SERPL W/O P-5'-P-CCNC: 34 U/L (ref 10–44)
ANION GAP SERPL CALC-SCNC: 5 MMOL/L (ref 8–16)
AST SERPL-CCNC: 55 U/L (ref 10–40)
BASOPHILS # BLD AUTO: 0.02 K/UL (ref 0–0.2)
BASOPHILS NFR BLD: 0.4 % (ref 0–1.9)
BILIRUB SERPL-MCNC: 0.6 MG/DL (ref 0.1–1)
BUN SERPL-MCNC: 15 MG/DL (ref 8–23)
CALCIUM SERPL-MCNC: 8.9 MG/DL (ref 8.7–10.5)
CHLORIDE SERPL-SCNC: 103 MMOL/L (ref 95–110)
CO2 SERPL-SCNC: 28 MMOL/L (ref 23–29)
CREAT SERPL-MCNC: 1.3 MG/DL (ref 0.5–1.4)
DIFFERENTIAL METHOD: ABNORMAL
EOSINOPHIL # BLD AUTO: 0.3 K/UL (ref 0–0.5)
EOSINOPHIL NFR BLD: 5.4 % (ref 0–8)
ERYTHROCYTE [DISTWIDTH] IN BLOOD BY AUTOMATED COUNT: 12.1 % (ref 11.5–14.5)
EST. GFR  (AFRICAN AMERICAN): >60 ML/MIN/1.73 M^2
EST. GFR  (NON AFRICAN AMERICAN): 56.9 ML/MIN/1.73 M^2
GLUCOSE SERPL-MCNC: 91 MG/DL (ref 70–110)
HCT VFR BLD AUTO: 40.1 % (ref 40–54)
HGB BLD-MCNC: 13.6 G/DL (ref 14–18)
IMM GRANULOCYTES # BLD AUTO: 0.01 K/UL (ref 0–0.04)
IMM GRANULOCYTES NFR BLD AUTO: 0.2 % (ref 0–0.5)
INR PPP: 1.1 (ref 0.8–1.2)
LYMPHOCYTES # BLD AUTO: 1.2 K/UL (ref 1–4.8)
LYMPHOCYTES NFR BLD: 23.9 % (ref 18–48)
MCH RBC QN AUTO: 33.8 PG (ref 27–31)
MCHC RBC AUTO-ENTMCNC: 33.9 G/DL (ref 32–36)
MCV RBC AUTO: 100 FL (ref 82–98)
MONOCYTES # BLD AUTO: 0.6 K/UL (ref 0.3–1)
MONOCYTES NFR BLD: 11.4 % (ref 4–15)
NEUTROPHILS # BLD AUTO: 3.1 K/UL (ref 1.8–7.7)
NEUTROPHILS NFR BLD: 58.7 % (ref 38–73)
NRBC BLD-RTO: 0 /100 WBC
PLATELET # BLD AUTO: 196 K/UL (ref 150–450)
PMV BLD AUTO: 11.6 FL (ref 9.2–12.9)
POTASSIUM SERPL-SCNC: 4.7 MMOL/L (ref 3.5–5.1)
PROT SERPL-MCNC: 6.8 G/DL (ref 6–8.4)
PROTHROMBIN TIME: 11.3 SEC (ref 9–12.5)
RBC # BLD AUTO: 4.02 M/UL (ref 4.6–6.2)
SODIUM SERPL-SCNC: 136 MMOL/L (ref 136–145)
WBC # BLD AUTO: 5.19 K/UL (ref 3.9–12.7)

## 2022-05-31 PROCEDURE — 80053 COMPREHEN METABOLIC PANEL: CPT | Performed by: INTERNAL MEDICINE

## 2022-05-31 PROCEDURE — 85610 PROTHROMBIN TIME: CPT | Performed by: INTERNAL MEDICINE

## 2022-05-31 PROCEDURE — 80197 ASSAY OF TACROLIMUS: CPT | Performed by: INTERNAL MEDICINE

## 2022-05-31 PROCEDURE — 87517 HEPATITIS B DNA QUANT: CPT | Performed by: INTERNAL MEDICINE

## 2022-05-31 PROCEDURE — 36415 COLL VENOUS BLD VENIPUNCTURE: CPT | Mod: PO | Performed by: INTERNAL MEDICINE

## 2022-05-31 PROCEDURE — 87340 HEPATITIS B SURFACE AG IA: CPT | Performed by: INTERNAL MEDICINE

## 2022-05-31 PROCEDURE — 82105 ALPHA-FETOPROTEIN SERUM: CPT | Performed by: INTERNAL MEDICINE

## 2022-05-31 PROCEDURE — 85025 COMPLETE CBC W/AUTO DIFF WBC: CPT | Performed by: INTERNAL MEDICINE

## 2022-06-01 ENCOUNTER — TELEPHONE (OUTPATIENT)
Dept: TRANSPLANT | Facility: CLINIC | Age: 67
End: 2022-06-01
Payer: MEDICARE

## 2022-06-01 LAB
HBV SURFACE AG SERPL QL IA: NEGATIVE
TACROLIMUS BLD-MCNC: 4.1 NG/ML (ref 5–15)

## 2022-06-01 NOTE — TELEPHONE ENCOUNTER
Labs reviewed and are stable, continue q 3 months. Letter sent.     ----- Message from Jim Ball MD sent at 6/1/2022 12:03 PM CDT -----  Results reviewed. No action.

## 2022-06-01 NOTE — LETTER
June 1, 2022    Mario Grier  [address]  [place] [address]          Dear Mario Grier:  MRN: [id number]    This is a follow up to your recent labs, your lab results were stable.  There are no medicine changes.  Please have your labs drawn again on 8/29/22.      If you cannot have your labs drawn on the scheduled date, it is your responsibility to call the transplant department to reschedule.  Please call [phone] and ask to speak to Shari PRIETO -  for all scheduling requests.     Sincerely,    Marybeth Ruiz, RN,BSN,CCTC    Your Liver Transplant Coordinator    [facility]  [address] [place] [address] [phone]

## 2022-06-28 DIAGNOSIS — Z94.4 LIVER REPLACED BY TRANSPLANT: Primary | ICD-10-CM

## 2022-08-22 ENCOUNTER — LAB VISIT (OUTPATIENT)
Dept: LAB | Facility: HOSPITAL | Age: 67
End: 2022-08-22
Attending: INTERNAL MEDICINE
Payer: MEDICARE

## 2022-08-22 DIAGNOSIS — Z94.4 LIVER REPLACED BY TRANSPLANT: ICD-10-CM

## 2022-08-22 DIAGNOSIS — Z85.05 PERSONAL HISTORY OF LIVER CANCER: ICD-10-CM

## 2022-08-22 LAB
ALBUMIN SERPL BCP-MCNC: 3.8 G/DL (ref 3.5–5.2)
ALP SERPL-CCNC: 80 U/L (ref 55–135)
ALT SERPL W/O P-5'-P-CCNC: 14 U/L (ref 10–44)
ANION GAP SERPL CALC-SCNC: 7 MMOL/L (ref 8–16)
AST SERPL-CCNC: 19 U/L (ref 10–40)
BASOPHILS # BLD AUTO: 0.03 K/UL (ref 0–0.2)
BASOPHILS NFR BLD: 0.6 % (ref 0–1.9)
BILIRUB SERPL-MCNC: 1 MG/DL (ref 0.1–1)
BUN SERPL-MCNC: 14 MG/DL (ref 8–23)
CALCIUM SERPL-MCNC: 8.9 MG/DL (ref 8.7–10.5)
CHLORIDE SERPL-SCNC: 102 MMOL/L (ref 95–110)
CO2 SERPL-SCNC: 28 MMOL/L (ref 23–29)
CREAT SERPL-MCNC: 1.1 MG/DL (ref 0.5–1.4)
DIFFERENTIAL METHOD: ABNORMAL
EOSINOPHIL # BLD AUTO: 0.4 K/UL (ref 0–0.5)
EOSINOPHIL NFR BLD: 6.6 % (ref 0–8)
ERYTHROCYTE [DISTWIDTH] IN BLOOD BY AUTOMATED COUNT: 12.7 % (ref 11.5–14.5)
EST. GFR  (NO RACE VARIABLE): >60 ML/MIN/1.73 M^2
GLUCOSE SERPL-MCNC: 88 MG/DL (ref 70–110)
HCT VFR BLD AUTO: 37.4 % (ref 40–54)
HGB BLD-MCNC: 12.6 G/DL (ref 14–18)
IMM GRANULOCYTES # BLD AUTO: 0.01 K/UL (ref 0–0.04)
IMM GRANULOCYTES NFR BLD AUTO: 0.2 % (ref 0–0.5)
INR PPP: 1.1 (ref 0.8–1.2)
LYMPHOCYTES # BLD AUTO: 1.3 K/UL (ref 1–4.8)
LYMPHOCYTES NFR BLD: 24 % (ref 18–48)
MCH RBC QN AUTO: 35.3 PG (ref 27–31)
MCHC RBC AUTO-ENTMCNC: 33.7 G/DL (ref 32–36)
MCV RBC AUTO: 105 FL (ref 82–98)
MONOCYTES # BLD AUTO: 0.6 K/UL (ref 0.3–1)
MONOCYTES NFR BLD: 11.6 % (ref 4–15)
NEUTROPHILS # BLD AUTO: 3.1 K/UL (ref 1.8–7.7)
NEUTROPHILS NFR BLD: 57 % (ref 38–73)
NRBC BLD-RTO: 0 /100 WBC
PLATELET # BLD AUTO: 171 K/UL (ref 150–450)
PMV BLD AUTO: 11.8 FL (ref 9.2–12.9)
POTASSIUM SERPL-SCNC: 4.3 MMOL/L (ref 3.5–5.1)
PROT SERPL-MCNC: 6.5 G/DL (ref 6–8.4)
PROTHROMBIN TIME: 11.3 SEC (ref 9–12.5)
RBC # BLD AUTO: 3.57 M/UL (ref 4.6–6.2)
SODIUM SERPL-SCNC: 137 MMOL/L (ref 136–145)
WBC # BLD AUTO: 5.42 K/UL (ref 3.9–12.7)

## 2022-08-22 PROCEDURE — 85025 COMPLETE CBC W/AUTO DIFF WBC: CPT | Performed by: INTERNAL MEDICINE

## 2022-08-22 PROCEDURE — 87340 HEPATITIS B SURFACE AG IA: CPT | Performed by: INTERNAL MEDICINE

## 2022-08-22 PROCEDURE — 80053 COMPREHEN METABOLIC PANEL: CPT | Performed by: INTERNAL MEDICINE

## 2022-08-22 PROCEDURE — 87517 HEPATITIS B DNA QUANT: CPT | Performed by: INTERNAL MEDICINE

## 2022-08-22 PROCEDURE — 80197 ASSAY OF TACROLIMUS: CPT | Performed by: INTERNAL MEDICINE

## 2022-08-22 PROCEDURE — 85610 PROTHROMBIN TIME: CPT | Performed by: INTERNAL MEDICINE

## 2022-08-23 ENCOUNTER — TELEPHONE (OUTPATIENT)
Dept: TRANSPLANT | Facility: CLINIC | Age: 67
End: 2022-08-23
Payer: MEDICARE

## 2022-08-23 LAB
HBV SURFACE AG SERPL QL IA: NEGATIVE
TACROLIMUS BLD-MCNC: 2.9 NG/ML (ref 5–15)

## 2022-08-23 NOTE — LETTER
August 23, 2022    Mario Grier  [address]  [place] [address]          Dear Mario Grier:  MRN: [id number]    This is a follow up to your recent labs, your lab results were stable.  There are no medicine changes.  Please have your labs drawn again on 11/14/22.      If you cannot have your labs drawn on the scheduled date, it is your responsibility to call the transplant department to reschedule.  Please call [phone] and ask to speak to Shari PRIETO -  for all scheduling requests.     Sincerely,    Marybeth Ruiz, RN,BSN,CCTC    Your Liver Transplant Coordinator    [facility]  [address] [place] [address] [phone]

## 2022-08-23 NOTE — TELEPHONE ENCOUNTER
Labs reviewed and are stable, continue q 3 months. Letter sent.     ----- Message from Jim Ball MD sent at 8/23/2022  9:44 AM CDT -----  Results reviewed. No action.

## 2022-08-25 ENCOUNTER — TELEPHONE (OUTPATIENT)
Dept: TRANSPLANT | Facility: CLINIC | Age: 67
End: 2022-08-25
Payer: MEDICARE

## 2022-09-01 ENCOUNTER — HOSPITAL ENCOUNTER (OUTPATIENT)
Dept: RADIOLOGY | Facility: HOSPITAL | Age: 67
Discharge: HOME | End: 2022-09-01
Attending: INTERNAL MEDICINE
Payer: MEDICARE

## 2022-09-01 DIAGNOSIS — Z85.05 PERSONAL HISTORY OF LIVER CANCER: ICD-10-CM

## 2022-09-01 DIAGNOSIS — Z94.4 LIVER REPLACED BY TRANSPLANT: ICD-10-CM

## 2022-09-01 PROCEDURE — 71260 CT THORAX DX C+: CPT | Mod: 26,,, | Performed by: RADIOLOGY

## 2022-09-01 PROCEDURE — 76705 ECHO EXAM OF ABDOMEN: CPT | Mod: TC

## 2022-09-01 PROCEDURE — 93976 VASCULAR STUDY: CPT | Mod: 26,,, | Performed by: STUDENT IN AN ORGANIZED HEALTH CARE EDUCATION/TRAINING PROGRAM

## 2022-09-01 PROCEDURE — 71260 CT THORAX DX C+: CPT | Mod: TC

## 2022-09-01 PROCEDURE — 74177 CT CHEST ABDOMEN PELVIS WITH CONTRAST (XPD): ICD-10-PCS | Mod: 26,,, | Performed by: RADIOLOGY

## 2022-09-01 PROCEDURE — 71260 CT CHEST ABDOMEN PELVIS WITH CONTRAST (XPD): ICD-10-PCS | Mod: 26,,, | Performed by: RADIOLOGY

## 2022-09-01 PROCEDURE — 76705 US DOPPLER LIVER TRANSPLANT POST (XPD): ICD-10-PCS | Mod: 26,59,, | Performed by: STUDENT IN AN ORGANIZED HEALTH CARE EDUCATION/TRAINING PROGRAM

## 2022-09-01 PROCEDURE — 74177 CT ABD & PELVIS W/CONTRAST: CPT | Mod: TC

## 2022-09-01 PROCEDURE — 93976 US DOPPLER LIVER TRANSPLANT POST (XPD): ICD-10-PCS | Mod: 26,,, | Performed by: STUDENT IN AN ORGANIZED HEALTH CARE EDUCATION/TRAINING PROGRAM

## 2022-09-01 PROCEDURE — 76705 ECHO EXAM OF ABDOMEN: CPT | Mod: 26,59,, | Performed by: STUDENT IN AN ORGANIZED HEALTH CARE EDUCATION/TRAINING PROGRAM

## 2022-09-01 PROCEDURE — 25500020 PHARM REV CODE 255: Performed by: INTERNAL MEDICINE

## 2022-09-01 PROCEDURE — 74177 CT ABD & PELVIS W/CONTRAST: CPT | Mod: 26,,, | Performed by: RADIOLOGY

## 2022-09-01 PROCEDURE — 93976 VASCULAR STUDY: CPT | Mod: TC

## 2022-09-01 RX ADMIN — IOHEXOL 75 ML: 350 INJECTION, SOLUTION INTRAVENOUS at 12:09

## 2022-09-02 ENCOUNTER — PATIENT MESSAGE (OUTPATIENT)
Dept: TRANSPLANT | Facility: CLINIC | Age: 67
End: 2022-09-02
Payer: MEDICARE

## 2022-09-02 ENCOUNTER — TELEPHONE (OUTPATIENT)
Dept: TRANSPLANT | Facility: CLINIC | Age: 67
End: 2022-09-02
Payer: MEDICARE

## 2022-09-02 DIAGNOSIS — K74.00 LIVER FIBROSIS, TRANSPLANTED LIVER: Primary | ICD-10-CM

## 2022-09-02 DIAGNOSIS — I85.00 ESOPHAGEAL VARICES WITHOUT BLEEDING, UNSPECIFIED ESOPHAGEAL VARICES TYPE: ICD-10-CM

## 2022-09-02 DIAGNOSIS — Z94.4 LIVER REPLACED BY TRANSPLANT: ICD-10-CM

## 2022-09-02 DIAGNOSIS — T86.49 LIVER FIBROSIS, TRANSPLANTED LIVER: Primary | ICD-10-CM

## 2022-09-02 NOTE — TELEPHONE ENCOUNTER
Informed pt scans reviewed, will place order for EGD.       ----- Message from Danelle Grullon MD sent at 9/1/2022  5:00 PM CDT -----  No liver cancer but varices noted. Recommend EGD to rule out varices that need to be banded. Please schedule- please let patient know.

## 2022-09-16 ENCOUNTER — TELEPHONE (OUTPATIENT)
Dept: ENDOSCOPY | Facility: HOSPITAL | Age: 67
End: 2022-09-16
Payer: MEDICARE

## 2022-09-20 ENCOUNTER — PATIENT MESSAGE (OUTPATIENT)
Dept: TRANSPLANT | Facility: CLINIC | Age: 67
End: 2022-09-20
Payer: MEDICARE

## 2022-09-28 ENCOUNTER — TELEPHONE (OUTPATIENT)
Dept: ENDOCRINOLOGY | Facility: CLINIC | Age: 67
End: 2022-09-28
Payer: MEDICARE

## 2022-09-28 ENCOUNTER — TELEPHONE (OUTPATIENT)
Dept: GASTROENTEROLOGY | Facility: CLINIC | Age: 67
End: 2022-09-28
Payer: MEDICARE

## 2022-09-28 NOTE — TELEPHONE ENCOUNTER
----- Message from Lyn Carlos sent at 9/28/2022  2:41 PM CDT -----  Contact: self  Type: Needs Medical Advice    Who Called:  patient     Best Call Back Number: [phone]    Additional Information: The pt stated that he would like to est NP care with a Mukesh Garland. The pt stated that he is a transplant pt and he does not want to drive to [employer] anymore. The pt stated that he needs to get his annual checkup. Please call pt back and advice. Thanks.

## 2022-09-28 NOTE — TELEPHONE ENCOUNTER
Ma called pt back   No answer message left on pt vm   Pt was given the number to Guy to schedule his EGD

## 2022-09-29 ENCOUNTER — TELEPHONE (OUTPATIENT)
Dept: ENDOCRINOLOGY | Facility: CLINIC | Age: 67
End: 2022-09-29
Payer: MEDICARE

## 2022-09-29 NOTE — TELEPHONE ENCOUNTER
----- Message from Syeda Flores sent at 9/29/2022  9:48 AM CDT -----  Type:  Patient Returning Call    Who Called:  pt  Who Left Message for Patient:  Suri  Does the patient know what this is regarding?:  yes  Best Call Back Number:  [phone] (home)     Additional Information:  please call and advise--thank you

## 2022-09-29 NOTE — TELEPHONE ENCOUNTER
S/w pt and states he needed to have some type of throat scope. I told him he needed to go to endoscopy,to call main number and tell them to transfer him to endoscopy department

## 2022-10-02 ENCOUNTER — PATIENT MESSAGE (OUTPATIENT)
Dept: TRANSPLANT | Facility: CLINIC | Age: 67
End: 2022-10-02
Payer: MEDICARE

## 2022-10-03 ENCOUNTER — TELEPHONE (OUTPATIENT)
Dept: GASTROENTEROLOGY | Facility: CLINIC | Age: 67
End: 2022-10-03
Payer: MEDICARE

## 2022-10-03 DIAGNOSIS — T86.49 LIVER FIBROSIS, TRANSPLANTED LIVER: ICD-10-CM

## 2022-10-03 DIAGNOSIS — Z94.4 LIVER REPLACED BY TRANSPLANT: Primary | ICD-10-CM

## 2022-10-03 DIAGNOSIS — K74.00 LIVER FIBROSIS, TRANSPLANTED LIVER: ICD-10-CM

## 2022-10-26 ENCOUNTER — CLINICAL SUPPORT (OUTPATIENT)
Dept: ENDOSCOPY | Facility: HOSPITAL | Age: 67
End: 2022-10-26
Attending: INTERNAL MEDICINE
Payer: MEDICARE

## 2022-10-26 VITALS — HEIGHT: 66 IN | BODY MASS INDEX: 20.89 KG/M2 | WEIGHT: 130 LBS

## 2022-10-26 DIAGNOSIS — K74.00 LIVER FIBROSIS, TRANSPLANTED LIVER: ICD-10-CM

## 2022-10-26 DIAGNOSIS — T86.49 LIVER FIBROSIS, TRANSPLANTED LIVER: ICD-10-CM

## 2022-10-26 DIAGNOSIS — Z94.4 LIVER REPLACED BY TRANSPLANT: ICD-10-CM

## 2022-10-26 NOTE — PLAN OF CARE
Endoscopy procedure scheduled on 11/17/22, prep instructions reviewed, Pt verbalized understanding.

## 2022-11-07 ENCOUNTER — PATIENT MESSAGE (OUTPATIENT)
Dept: TRANSPLANT | Facility: CLINIC | Age: 67
End: 2022-11-07
Payer: MEDICARE

## 2022-11-07 ENCOUNTER — TELEPHONE (OUTPATIENT)
Dept: ENDOSCOPY | Facility: HOSPITAL | Age: 67
End: 2022-11-07
Payer: MEDICARE

## 2022-11-07 ENCOUNTER — LAB VISIT (OUTPATIENT)
Dept: LAB | Facility: HOSPITAL | Age: 67
End: 2022-11-07
Attending: INTERNAL MEDICINE
Payer: MEDICARE

## 2022-11-07 DIAGNOSIS — Z85.05 PERSONAL HISTORY OF LIVER CANCER: ICD-10-CM

## 2022-11-07 DIAGNOSIS — Z94.4 LIVER REPLACED BY TRANSPLANT: ICD-10-CM

## 2022-11-07 LAB
AFP SERPL-MCNC: <2 NG/ML (ref 0–8.4)
ALBUMIN SERPL BCP-MCNC: 3.6 G/DL (ref 3.5–5.2)
ALP SERPL-CCNC: 81 U/L (ref 55–135)
ALT SERPL W/O P-5'-P-CCNC: 24 U/L (ref 10–44)
ANION GAP SERPL CALC-SCNC: 7 MMOL/L (ref 8–16)
AST SERPL-CCNC: 23 U/L (ref 10–40)
BASOPHILS # BLD AUTO: 0.02 K/UL (ref 0–0.2)
BASOPHILS NFR BLD: 0.4 % (ref 0–1.9)
BILIRUB SERPL-MCNC: 0.4 MG/DL (ref 0.1–1)
BUN SERPL-MCNC: 14 MG/DL (ref 8–23)
CALCIUM SERPL-MCNC: 9.2 MG/DL (ref 8.7–10.5)
CHLORIDE SERPL-SCNC: 104 MMOL/L (ref 95–110)
CO2 SERPL-SCNC: 27 MMOL/L (ref 23–29)
CREAT SERPL-MCNC: 1.1 MG/DL (ref 0.5–1.4)
DIFFERENTIAL METHOD: ABNORMAL
EOSINOPHIL # BLD AUTO: 0.3 K/UL (ref 0–0.5)
EOSINOPHIL NFR BLD: 6.6 % (ref 0–8)
ERYTHROCYTE [DISTWIDTH] IN BLOOD BY AUTOMATED COUNT: 12.1 % (ref 11.5–14.5)
EST. GFR  (NO RACE VARIABLE): >60 ML/MIN/1.73 M^2
GLUCOSE SERPL-MCNC: 99 MG/DL (ref 70–110)
HCT VFR BLD AUTO: 36.1 % (ref 40–54)
HGB BLD-MCNC: 12.2 G/DL (ref 14–18)
IMM GRANULOCYTES # BLD AUTO: 0.01 K/UL (ref 0–0.04)
IMM GRANULOCYTES NFR BLD AUTO: 0.2 % (ref 0–0.5)
INR PPP: 1.1 (ref 0.8–1.2)
LYMPHOCYTES # BLD AUTO: 1.4 K/UL (ref 1–4.8)
LYMPHOCYTES NFR BLD: 27.4 % (ref 18–48)
MCH RBC QN AUTO: 35.2 PG (ref 27–31)
MCHC RBC AUTO-ENTMCNC: 33.8 G/DL (ref 32–36)
MCV RBC AUTO: 104 FL (ref 82–98)
MONOCYTES # BLD AUTO: 0.5 K/UL (ref 0.3–1)
MONOCYTES NFR BLD: 10.4 % (ref 4–15)
NEUTROPHILS # BLD AUTO: 2.9 K/UL (ref 1.8–7.7)
NEUTROPHILS NFR BLD: 55 % (ref 38–73)
NRBC BLD-RTO: 0 /100 WBC
PLATELET # BLD AUTO: 157 K/UL (ref 150–450)
PMV BLD AUTO: 11.7 FL (ref 9.2–12.9)
POTASSIUM SERPL-SCNC: 4.9 MMOL/L (ref 3.5–5.1)
PROT SERPL-MCNC: 6.2 G/DL (ref 6–8.4)
PROTHROMBIN TIME: 11.1 SEC (ref 9–12.5)
RBC # BLD AUTO: 3.47 M/UL (ref 4.6–6.2)
SODIUM SERPL-SCNC: 138 MMOL/L (ref 136–145)
WBC # BLD AUTO: 5.19 K/UL (ref 3.9–12.7)

## 2022-11-07 PROCEDURE — 82105 ALPHA-FETOPROTEIN SERUM: CPT | Performed by: INTERNAL MEDICINE

## 2022-11-07 PROCEDURE — 85025 COMPLETE CBC W/AUTO DIFF WBC: CPT | Performed by: INTERNAL MEDICINE

## 2022-11-07 PROCEDURE — 36415 COLL VENOUS BLD VENIPUNCTURE: CPT | Mod: PO | Performed by: INTERNAL MEDICINE

## 2022-11-07 PROCEDURE — 80053 COMPREHEN METABOLIC PANEL: CPT | Performed by: INTERNAL MEDICINE

## 2022-11-07 PROCEDURE — 80197 ASSAY OF TACROLIMUS: CPT | Performed by: INTERNAL MEDICINE

## 2022-11-07 PROCEDURE — 85610 PROTHROMBIN TIME: CPT | Performed by: INTERNAL MEDICINE

## 2022-11-08 ENCOUNTER — TELEPHONE (OUTPATIENT)
Dept: TRANSPLANT | Facility: CLINIC | Age: 67
End: 2022-11-08
Payer: MEDICARE

## 2022-11-08 LAB — TACROLIMUS BLD-MCNC: 7 NG/ML (ref 5–15)

## 2022-11-08 NOTE — LETTER
November 8, 2022    Mario Grier  [address]  [place] [address]          Dear Mario Grier:  MRN: [id number]    This is a follow up to your recent labs, your lab results were stable.  There are no medicine changes.  Please have your labs drawn again on 2/13/23.      If you cannot have your labs drawn on the scheduled date, it is your responsibility to call the transplant department to reschedule.  Please call [phone] and ask to speak to Shari PRIETO -  for all scheduling requests.     Sincerely,    Marybeth Ruiz, RN,BSN,CCTC    Your Liver Transplant Coordinator    [facility]  [address] [place] [address] [phone]

## 2022-11-08 NOTE — TELEPHONE ENCOUNTER
Labs reviewed and are stable, continue q 12 weeks. Letter sent.         ----- Message from Jim Ball MD sent at 11/8/2022  9:06 AM CST -----  Results reviewed. No action.

## 2023-02-13 ENCOUNTER — LAB VISIT (OUTPATIENT)
Dept: LAB | Facility: HOSPITAL | Age: 68
End: 2023-02-13
Payer: MEDICARE

## 2023-02-13 DIAGNOSIS — Z85.05 PERSONAL HISTORY OF LIVER CANCER: ICD-10-CM

## 2023-02-13 DIAGNOSIS — Z94.4 LIVER REPLACED BY TRANSPLANT: ICD-10-CM

## 2023-02-13 LAB
AFP SERPL-MCNC: <2 NG/ML (ref 0–8.4)
ALBUMIN SERPL BCP-MCNC: 4.2 G/DL (ref 3.5–5.2)
ALP SERPL-CCNC: 113 U/L (ref 55–135)
ALT SERPL W/O P-5'-P-CCNC: 25 U/L (ref 10–44)
ANION GAP SERPL CALC-SCNC: 10 MMOL/L (ref 8–16)
AST SERPL-CCNC: 27 U/L (ref 10–40)
BASOPHILS # BLD AUTO: 0.02 K/UL (ref 0–0.2)
BASOPHILS NFR BLD: 0.4 % (ref 0–1.9)
BILIRUB SERPL-MCNC: 0.6 MG/DL (ref 0.1–1)
BUN SERPL-MCNC: 12 MG/DL (ref 8–23)
CALCIUM SERPL-MCNC: 9.6 MG/DL (ref 8.7–10.5)
CHLORIDE SERPL-SCNC: 100 MMOL/L (ref 95–110)
CO2 SERPL-SCNC: 28 MMOL/L (ref 23–29)
CREAT SERPL-MCNC: 1.1 MG/DL (ref 0.5–1.4)
DIFFERENTIAL METHOD: ABNORMAL
EOSINOPHIL # BLD AUTO: 0.3 K/UL (ref 0–0.5)
EOSINOPHIL NFR BLD: 6.5 % (ref 0–8)
ERYTHROCYTE [DISTWIDTH] IN BLOOD BY AUTOMATED COUNT: 12.2 % (ref 11.5–14.5)
EST. GFR  (NO RACE VARIABLE): >60 ML/MIN/1.73 M^2
GLUCOSE SERPL-MCNC: 102 MG/DL (ref 70–110)
HBV SURFACE AG SERPL QL IA: NORMAL
HCT VFR BLD AUTO: 42.8 % (ref 40–54)
HGB BLD-MCNC: 14.2 G/DL (ref 14–18)
IMM GRANULOCYTES # BLD AUTO: 0.02 K/UL (ref 0–0.04)
IMM GRANULOCYTES NFR BLD AUTO: 0.4 % (ref 0–0.5)
INR PPP: 1.1 (ref 0.8–1.2)
LYMPHOCYTES # BLD AUTO: 1.2 K/UL (ref 1–4.8)
LYMPHOCYTES NFR BLD: 22.7 % (ref 18–48)
MCH RBC QN AUTO: 34.4 PG (ref 27–31)
MCHC RBC AUTO-ENTMCNC: 33.2 G/DL (ref 32–36)
MCV RBC AUTO: 104 FL (ref 82–98)
MONOCYTES # BLD AUTO: 0.6 K/UL (ref 0.3–1)
MONOCYTES NFR BLD: 10.5 % (ref 4–15)
NEUTROPHILS # BLD AUTO: 3.1 K/UL (ref 1.8–7.7)
NEUTROPHILS NFR BLD: 59.5 % (ref 38–73)
NRBC BLD-RTO: 0 /100 WBC
PLATELET # BLD AUTO: 162 K/UL (ref 150–450)
PMV BLD AUTO: 12.2 FL (ref 9.2–12.9)
POTASSIUM SERPL-SCNC: 4.5 MMOL/L (ref 3.5–5.1)
PROT SERPL-MCNC: 7.1 G/DL (ref 6–8.4)
PROTHROMBIN TIME: 11.4 SEC (ref 9–12.5)
RBC # BLD AUTO: 4.13 M/UL (ref 4.6–6.2)
SODIUM SERPL-SCNC: 138 MMOL/L (ref 136–145)
WBC # BLD AUTO: 5.25 K/UL (ref 3.9–12.7)

## 2023-02-13 PROCEDURE — 80053 COMPREHEN METABOLIC PANEL: CPT | Performed by: INTERNAL MEDICINE

## 2023-02-13 PROCEDURE — 87340 HEPATITIS B SURFACE AG IA: CPT | Performed by: INTERNAL MEDICINE

## 2023-02-13 PROCEDURE — 85610 PROTHROMBIN TIME: CPT | Performed by: INTERNAL MEDICINE

## 2023-02-13 PROCEDURE — 87517 HEPATITIS B DNA QUANT: CPT | Performed by: INTERNAL MEDICINE

## 2023-02-13 PROCEDURE — 80197 ASSAY OF TACROLIMUS: CPT | Performed by: INTERNAL MEDICINE

## 2023-02-13 PROCEDURE — 82105 ALPHA-FETOPROTEIN SERUM: CPT | Performed by: INTERNAL MEDICINE

## 2023-02-13 PROCEDURE — 36415 COLL VENOUS BLD VENIPUNCTURE: CPT | Mod: PO | Performed by: INTERNAL MEDICINE

## 2023-02-13 PROCEDURE — 85025 COMPLETE CBC W/AUTO DIFF WBC: CPT | Performed by: INTERNAL MEDICINE

## 2023-02-14 ENCOUNTER — TELEPHONE (OUTPATIENT)
Dept: TRANSPLANT | Facility: CLINIC | Age: 68
End: 2023-02-14
Payer: MEDICARE

## 2023-02-14 DIAGNOSIS — Z94.4 LIVER REPLACED BY TRANSPLANT: Primary | ICD-10-CM

## 2023-02-14 LAB — TACROLIMUS BLD-MCNC: 4.6 NG/ML (ref 5–15)

## 2023-02-14 NOTE — TELEPHONE ENCOUNTER
Labs reviewed and are stable, continue q 3 months. Letter sent.       ----- Message from Jim Ball MD sent at 2/14/2023 10:04 AM CST -----  Results reviewed. No action.

## 2023-02-14 NOTE — LETTER
February 14, 2023    Mario Marvel  [address]  [place] [address]          Dear Mario Grier:  MRN: [id number]    This is a follow up to your recent labs, your lab results were stable.  There are no medicine changes.  Please have your labs drawn again on 5/8/23.      If you cannot have your labs drawn on the scheduled date, it is your responsibility to call the transplant department to reschedule.  Please call [phone] and ask to speak to Shari PRIETO -  for all scheduling requests.     Sincerely,    Marybeth Ruiz, RN,BSN,CCTC    Your Liver Transplant Coordinator    [facility]  [address] [place] [address] [phone]

## 2023-03-20 ENCOUNTER — TELEPHONE (OUTPATIENT)
Dept: TRANSPLANT | Facility: CLINIC | Age: 68
End: 2023-03-20
Payer: MEDICARE

## 2023-03-27 ENCOUNTER — TELEPHONE (OUTPATIENT)
Dept: TRANSPLANT | Facility: CLINIC | Age: 68
End: 2023-03-27
Payer: MEDICARE

## 2023-03-27 ENCOUNTER — PATIENT MESSAGE (OUTPATIENT)
Dept: TRANSPLANT | Facility: CLINIC | Age: 68
End: 2023-03-27
Payer: MEDICARE

## 2023-03-27 ENCOUNTER — HOSPITAL ENCOUNTER (OUTPATIENT)
Dept: RADIOLOGY | Facility: HOSPITAL | Age: 68
Discharge: HOME | End: 2023-03-27
Attending: INTERNAL MEDICINE
Payer: MEDICARE

## 2023-03-27 DIAGNOSIS — Z85.05 PERSONAL HISTORY OF LIVER CANCER: ICD-10-CM

## 2023-03-27 DIAGNOSIS — Z94.4 LIVER REPLACED BY TRANSPLANT: ICD-10-CM

## 2023-03-27 DIAGNOSIS — Z94.4 LIVER REPLACED BY TRANSPLANT: Primary | ICD-10-CM

## 2023-03-27 PROCEDURE — 76705 ECHO EXAM OF ABDOMEN: CPT | Mod: 26,59,, | Performed by: RADIOLOGY

## 2023-03-27 PROCEDURE — 93976 VASCULAR STUDY: CPT | Mod: TC,PO

## 2023-03-27 PROCEDURE — 93976 VASCULAR STUDY: CPT | Mod: 26,,, | Performed by: RADIOLOGY

## 2023-03-27 PROCEDURE — 76705 US DOPPLER LIVER TRANSPLANT POST (XPD): ICD-10-PCS | Mod: 26,59,, | Performed by: RADIOLOGY

## 2023-03-27 PROCEDURE — 93976 US DOPPLER LIVER TRANSPLANT POST (XPD): ICD-10-PCS | Mod: 26,,, | Performed by: RADIOLOGY

## 2023-03-27 NOTE — TELEPHONE ENCOUNTER
Liver ultrasound reviewed and is stable, will repeat in 6 months.       ----- Message from Jim Ball MD sent at 3/27/2023 12:57 PM CDT -----  Results reviewed. No action.

## 2023-04-03 ENCOUNTER — OFFICE VISIT (OUTPATIENT)
Dept: TRANSPLANT | Facility: CLINIC | Age: 68
End: 2023-04-03
Attending: INTERNAL MEDICINE
Payer: MEDICARE

## 2023-04-03 VITALS
HEIGHT: 66 IN | HEART RATE: 57 BPM | RESPIRATION RATE: 16 BRPM | OXYGEN SATURATION: 97 % | BODY MASS INDEX: 21.79 KG/M2 | DIASTOLIC BLOOD PRESSURE: 82 MMHG | SYSTOLIC BLOOD PRESSURE: 156 MMHG | WEIGHT: 135.56 LBS | TEMPERATURE: 97 F

## 2023-04-03 DIAGNOSIS — Z00.5 HEPATITIS B VIRUS INFECTION IN CADAVERIC DONOR: ICD-10-CM

## 2023-04-03 DIAGNOSIS — Z94.4 LIVER REPLACED BY TRANSPLANT: ICD-10-CM

## 2023-04-03 DIAGNOSIS — Z86.19 H/O HEPATITIS: Primary | ICD-10-CM

## 2023-04-03 DIAGNOSIS — B19.10 HEPATITIS B VIRUS INFECTION IN CADAVERIC DONOR: ICD-10-CM

## 2023-04-03 DIAGNOSIS — Z29.89 PROPHYLACTIC IMMUNOTHERAPY: Chronic | ICD-10-CM

## 2023-04-03 PROCEDURE — 3288F PR FALLS RISK ASSESSMENT DOCUMENTED: ICD-10-PCS | Mod: CPTII,S$GLB,, | Performed by: INTERNAL MEDICINE

## 2023-04-03 PROCEDURE — 99214 PR OFFICE/OUTPT VISIT, EST, LEVL IV, 30-39 MIN: ICD-10-PCS | Mod: S$GLB,,, | Performed by: INTERNAL MEDICINE

## 2023-04-03 PROCEDURE — 99214 OFFICE O/P EST MOD 30 MIN: CPT | Mod: S$GLB,,, | Performed by: INTERNAL MEDICINE

## 2023-04-03 PROCEDURE — 99999 PR PBB SHADOW E&M-EST. PATIENT-LVL III: ICD-10-PCS | Mod: PBBFAC,,, | Performed by: INTERNAL MEDICINE

## 2023-04-03 PROCEDURE — 3008F BODY MASS INDEX DOCD: CPT | Mod: CPTII,S$GLB,, | Performed by: INTERNAL MEDICINE

## 2023-04-03 PROCEDURE — 1160F PR REVIEW ALL MEDS BY PRESCRIBER/CLIN PHARMACIST DOCUMENTED: ICD-10-PCS | Mod: CPTII,S$GLB,, | Performed by: INTERNAL MEDICINE

## 2023-04-03 PROCEDURE — 1126F AMNT PAIN NOTED NONE PRSNT: CPT | Mod: CPTII,S$GLB,, | Performed by: INTERNAL MEDICINE

## 2023-04-03 PROCEDURE — 3008F PR BODY MASS INDEX (BMI) DOCUMENTED: ICD-10-PCS | Mod: CPTII,S$GLB,, | Performed by: INTERNAL MEDICINE

## 2023-04-03 PROCEDURE — 3077F PR MOST RECENT SYSTOLIC BLOOD PRESSURE >= 140 MM HG: ICD-10-PCS | Mod: CPTII,S$GLB,, | Performed by: INTERNAL MEDICINE

## 2023-04-03 PROCEDURE — 3079F PR MOST RECENT DIASTOLIC BLOOD PRESSURE 80-89 MM HG: ICD-10-PCS | Mod: CPTII,S$GLB,, | Performed by: INTERNAL MEDICINE

## 2023-04-03 PROCEDURE — 1101F PT FALLS ASSESS-DOCD LE1/YR: CPT | Mod: CPTII,S$GLB,, | Performed by: INTERNAL MEDICINE

## 2023-04-03 PROCEDURE — 1160F RVW MEDS BY RX/DR IN RCRD: CPT | Mod: CPTII,S$GLB,, | Performed by: INTERNAL MEDICINE

## 2023-04-03 PROCEDURE — 1126F PR PAIN SEVERITY QUANTIFIED, NO PAIN PRESENT: ICD-10-PCS | Mod: CPTII,S$GLB,, | Performed by: INTERNAL MEDICINE

## 2023-04-03 PROCEDURE — 3077F SYST BP >= 140 MM HG: CPT | Mod: CPTII,S$GLB,, | Performed by: INTERNAL MEDICINE

## 2023-04-03 PROCEDURE — 1159F MED LIST DOCD IN RCRD: CPT | Mod: CPTII,S$GLB,, | Performed by: INTERNAL MEDICINE

## 2023-04-03 PROCEDURE — 1157F ADVNC CARE PLAN IN RCRD: CPT | Mod: CPTII,S$GLB,, | Performed by: INTERNAL MEDICINE

## 2023-04-03 PROCEDURE — 1157F PR ADVANCE CARE PLAN OR EQUIV PRESENT IN MEDICAL RECORD: ICD-10-PCS | Mod: CPTII,S$GLB,, | Performed by: INTERNAL MEDICINE

## 2023-04-03 PROCEDURE — 3079F DIAST BP 80-89 MM HG: CPT | Mod: CPTII,S$GLB,, | Performed by: INTERNAL MEDICINE

## 2023-04-03 PROCEDURE — 1159F PR MEDICATION LIST DOCUMENTED IN MEDICAL RECORD: ICD-10-PCS | Mod: CPTII,S$GLB,, | Performed by: INTERNAL MEDICINE

## 2023-04-03 PROCEDURE — 99999 PR PBB SHADOW E&M-EST. PATIENT-LVL III: CPT | Mod: PBBFAC,,, | Performed by: INTERNAL MEDICINE

## 2023-04-03 PROCEDURE — 3288F FALL RISK ASSESSMENT DOCD: CPT | Mod: CPTII,S$GLB,, | Performed by: INTERNAL MEDICINE

## 2023-04-03 PROCEDURE — 1101F PR PT FALLS ASSESS DOC 0-1 FALLS W/OUT INJ PAST YR: ICD-10-PCS | Mod: CPTII,S$GLB,, | Performed by: INTERNAL MEDICINE

## 2023-04-03 NOTE — LETTER
April 3, 2023        Neto Roblero  [address] [facility] [place] [address]  Phone: [phone]  Fax: [phone]             [facility]  [address]  [facility] [address]  Phone: [phone]   Patient: Mario Grier   MR Number: [id number]   YOB: 1955   Date of Visit: 4/3/2023       Dear Dr. Neto Roblero    Thank you for referring Mario Grier to me for evaluation. Attached you will find relevant portions of my assessment and plan of care.    If you have questions, please do not hesitate to call me. I look forward to following Mario Grier along with you.    Sincerely,    Jim Ball MD    Enclosure    If you would like to receive this communication electronically, please contact [email or web address] or [phone] to request [employer] access.    [employer] is a tool which provides read-only access to select patient information with whom you have a relationship. Its easy to use and provides real time access to review your patients record including encounter summaries, notes, results, and demographic information.    If you feel you have received this communication in error or would no longer like to receive these types of communications, please e-mail [email or web address]

## 2023-04-03 NOTE — PROGRESS NOTES
Transplant Hepatology  Liver Transplant Recipient Follow-up    Transplant Date: 2013  UNOS Native Liver Dx: Primary Liver Malignancy: Hepatoma (HCC) and Cirrhosis    Mario is here for follow up of his liver transplant.    ORGAN: LIVER  Whole or Partial: whole liver  Donor Type:  - brain death  CDC High Risk: no  Donor CMV Status: negative  Donor HCV Status: positive  Donor HBcAb: positive  Biliary Anastomosis: end to end  Arterial Anatomy: replaced left hepatic from left gastric  IVC reconstruction: end to end ivc  Portal vein status: patent    He has had the following complications since transplant: recurrent Hepatitis C. The noted complications have been addressed.    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  Posttransplant he developed recurrent hepatitis-C with stage III fibrosis.  He underwent treatment with antiviral therapy and has had a sustained virological response with undetectable HCV RNA.  His allograft function is normal on tacrolimus based immunosuppression.  He has been free of any clinical or radiological recurrence of hepatocellular cancer.  Because he has stage III fibrosis he undergoes regular hepatoma surveillance.  His last Doppler ultrasound showed no focal mass lesions.  His major issue seems to be ongoing issues with skin cancer, both squamous cell and basal cell carcinomas.  He sees Dermatology every 3 months.  His last removal of a skin lesion was about a year ago.  Because he received a hepatitis-B core antibody liver he receives hepatitis-B antiviral therapy.  Review of Systems   Constitutional:  Negative for activity change, appetite change, chills and fever.   HENT:  Negative for nosebleeds, rhinorrhea and sore throat.    Eyes:  Negative for photophobia and visual disturbance.   Respiratory:  Negative for chest tightness, shortness of  breath and wheezing.    Cardiovascular:  Negative for leg swelling.   Gastrointestinal:  Negative for abdominal distention, abdominal pain and blood in stool.   Endocrine: Negative for polydipsia and polyuria.   Genitourinary:  Negative for dysuria and hematuria.   Musculoskeletal:  Negative for arthralgias and gait problem.   Skin:  Negative for color change and rash.   Allergic/Immunologic: Negative for immunocompromised state.   Neurological:  Negative for seizures, syncope, numbness and headaches.   Hematological:  Negative for adenopathy. Does not bruise/bleed easily.   Psychiatric/Behavioral:  Negative for agitation, behavioral problems and confusion.      Objective:     Physical Exam  Constitutional:       Appearance: He is well-developed.   HENT:      Head: Normocephalic and atraumatic.      Mouth/Throat:      Pharynx: No oropharyngeal exudate.   Eyes:      General: No scleral icterus.     Conjunctiva/sclera: Conjunctivae normal.      Pupils: Pupils are equal, round, and reactive to light.   Cardiovascular:      Rate and Rhythm: Normal rate and regular rhythm.      Heart sounds: No murmur heard.  Pulmonary:      Effort: Pulmonary effort is normal. No respiratory distress.      Breath sounds: Normal breath sounds.   Abdominal:      General: Bowel sounds are normal. There is no distension.      Palpations: Abdomen is soft.      Tenderness: There is no abdominal tenderness.       Musculoskeletal:         General: Normal range of motion.      Cervical back: Normal range of motion.   Lymphadenopathy:      Cervical: No cervical adenopathy.   Skin:     General: Skin is warm and dry.   Neurological:      Mental Status: He is alert and oriented to person, place, and time.   Psychiatric:         Behavior: Behavior normal.     Lab Results   Component Value Date    BILITOT 0.6 02/13/2023    AST 27 02/13/2023    ALT 25 02/13/2023    ALKPHOS 113 02/13/2023    CREATININE 1.1 02/13/2023    ALBUMIN 4.2 02/13/2023     Lab  Results   Component Value Date    WBC 5.25 02/13/2023    HGB 14.2 02/13/2023    HCT 42.8 02/13/2023    HCT 32 (L) 07/11/2013     02/13/2023     Lab Results   Component Value Date    TACROLIMUS 4.6 (L) 02/13/2023       Assessment/Plan:   H/O hepatitis C    Hepatitis B virus infection in cadaveric donor    Liver replaced by transplant    Prophylactic immunotherapy    I will be making no changes to his current immunosuppression.  He will continue to get blood work every 4 months.  He will get abdominal Doppler ultrasounds every 6 months.  He will return to clinic in 1 year's time.    Jim Ball MD           [facility] Patient Status  Functional Status: 90% - Able to carry on normal activity: minor symptoms of disease  Physical Capacity: No Limitations

## 2023-05-11 ENCOUNTER — PATIENT MESSAGE (OUTPATIENT)
Dept: TRANSPLANT | Facility: CLINIC | Age: 68
End: 2023-05-11
Payer: MEDICARE

## 2023-05-11 NOTE — TELEPHONE ENCOUNTER
----- Message from Jim Ball MD sent at 8/24/2022 12:01 PM CDT -----  Results reviewed. No action.   Please take the pantoprazole once daily, take 30 min to an hour before breakfast everyday  Please schedule your EGD test  Please continue to avoid all NSAID pain medications    EGD Instructions:    No eating or drinking the day of the procedure except for your medications in the morning with a small sip of water.   Please ensure you have a responsible adult who can take you home after the procedure.  Please ensure you get your COVID testing before the procedure as instructed.

## 2023-05-19 ENCOUNTER — PATIENT MESSAGE (OUTPATIENT)
Dept: TRANSPLANT | Facility: CLINIC | Age: 68
End: 2023-05-19
Payer: MEDICARE

## 2023-06-06 DIAGNOSIS — Z20.6 CONTACT WITH AND (SUSPECTED) EXPOSURE TO HUMAN IMMUNODEFICIENCY VIRUS (HIV): ICD-10-CM

## 2023-06-06 RX ORDER — EMTRICITABINE AND TENOFOVIR DISOPROXIL FUMARATE 200; 300 MG/1; MG/1
TABLET, FILM COATED ORAL
Qty: 30 TABLET | Refills: 10 | Status: SHIPPED | OUTPATIENT
Start: 2023-06-06

## 2023-06-06 RX ORDER — TACROLIMUS 1 MG/1
CAPSULE ORAL
Qty: 60 CAPSULE | Refills: 11 | Status: SHIPPED | OUTPATIENT
Start: 2023-06-06

## 2023-06-12 ENCOUNTER — LAB VISIT (OUTPATIENT)
Dept: LAB | Facility: HOSPITAL | Age: 68
End: 2023-06-12
Payer: MEDICARE

## 2023-06-12 DIAGNOSIS — Z85.05 PERSONAL HISTORY OF LIVER CANCER: ICD-10-CM

## 2023-06-12 DIAGNOSIS — Z94.4 LIVER REPLACED BY TRANSPLANT: ICD-10-CM

## 2023-06-12 LAB
ALBUMIN SERPL BCP-MCNC: 3.7 G/DL (ref 3.5–5.2)
ALP SERPL-CCNC: 94 U/L (ref 55–135)
ALT SERPL W/O P-5'-P-CCNC: 28 U/L (ref 10–44)
ANION GAP SERPL CALC-SCNC: 7 MMOL/L (ref 8–16)
AST SERPL-CCNC: 25 U/L (ref 10–40)
BASOPHILS # BLD AUTO: 0.03 K/UL (ref 0–0.2)
BASOPHILS NFR BLD: 0.6 % (ref 0–1.9)
BILIRUB SERPL-MCNC: 0.5 MG/DL (ref 0.1–1)
BUN SERPL-MCNC: 18 MG/DL (ref 8–23)
CALCIUM SERPL-MCNC: 9 MG/DL (ref 8.7–10.5)
CHLORIDE SERPL-SCNC: 105 MMOL/L (ref 95–110)
CO2 SERPL-SCNC: 27 MMOL/L (ref 23–29)
CREAT SERPL-MCNC: 1 MG/DL (ref 0.5–1.4)
DIFFERENTIAL METHOD: ABNORMAL
EOSINOPHIL # BLD AUTO: 0.7 K/UL (ref 0–0.5)
EOSINOPHIL NFR BLD: 13.8 % (ref 0–8)
ERYTHROCYTE [DISTWIDTH] IN BLOOD BY AUTOMATED COUNT: 12.4 % (ref 11.5–14.5)
EST. GFR  (NO RACE VARIABLE): >60 ML/MIN/1.73 M^2
GLUCOSE SERPL-MCNC: 101 MG/DL (ref 70–110)
HCT VFR BLD AUTO: 39.4 % (ref 40–54)
HGB BLD-MCNC: 13.2 G/DL (ref 14–18)
IMM GRANULOCYTES # BLD AUTO: 0.01 K/UL (ref 0–0.04)
IMM GRANULOCYTES NFR BLD AUTO: 0.2 % (ref 0–0.5)
INR PPP: 1 (ref 0.8–1.2)
LYMPHOCYTES # BLD AUTO: 1.2 K/UL (ref 1–4.8)
LYMPHOCYTES NFR BLD: 24.3 % (ref 18–48)
MCH RBC QN AUTO: 33.8 PG (ref 27–31)
MCHC RBC AUTO-ENTMCNC: 33.5 G/DL (ref 32–36)
MCV RBC AUTO: 101 FL (ref 82–98)
MONOCYTES # BLD AUTO: 0.5 K/UL (ref 0.3–1)
MONOCYTES NFR BLD: 10.3 % (ref 4–15)
NEUTROPHILS # BLD AUTO: 2.6 K/UL (ref 1.8–7.7)
NEUTROPHILS NFR BLD: 50.8 % (ref 38–73)
NRBC BLD-RTO: 0 /100 WBC
PLATELET # BLD AUTO: 146 K/UL (ref 150–450)
PMV BLD AUTO: 11.9 FL (ref 9.2–12.9)
POTASSIUM SERPL-SCNC: 4.7 MMOL/L (ref 3.5–5.1)
PROT SERPL-MCNC: 6.6 G/DL (ref 6–8.4)
PROTHROMBIN TIME: 11.2 SEC (ref 9–12.5)
RBC # BLD AUTO: 3.91 M/UL (ref 4.6–6.2)
SODIUM SERPL-SCNC: 139 MMOL/L (ref 136–145)
WBC # BLD AUTO: 5.06 K/UL (ref 3.9–12.7)

## 2023-06-12 PROCEDURE — 85610 PROTHROMBIN TIME: CPT | Performed by: INTERNAL MEDICINE

## 2023-06-12 PROCEDURE — 80053 COMPREHEN METABOLIC PANEL: CPT | Performed by: INTERNAL MEDICINE

## 2023-06-12 PROCEDURE — 80197 ASSAY OF TACROLIMUS: CPT | Performed by: INTERNAL MEDICINE

## 2023-06-12 PROCEDURE — 85025 COMPLETE CBC W/AUTO DIFF WBC: CPT | Performed by: INTERNAL MEDICINE

## 2023-06-12 PROCEDURE — 36415 COLL VENOUS BLD VENIPUNCTURE: CPT | Mod: PO | Performed by: INTERNAL MEDICINE

## 2023-06-13 ENCOUNTER — TELEPHONE (OUTPATIENT)
Dept: TRANSPLANT | Facility: CLINIC | Age: 68
End: 2023-06-13
Payer: MEDICARE

## 2023-06-13 LAB — TACROLIMUS BLD-MCNC: 9.4 NG/ML (ref 5–15)

## 2023-06-13 NOTE — TELEPHONE ENCOUNTER
Spoke to patient who states he accidentally forgot and took medication before labs were drawn. Will notify Dr. Ball and instructed pt not to adjust dose and will repeat labs.         ----- Message from Jim Ball MD sent at 6/13/2023  2:09 PM CDT -----  Results reviewed. Reduce tacrolimus to 0.5 mg bid.

## 2023-06-13 NOTE — LETTER
June 13, 2023    Mario Grier  [address]  [place] [address]          Dear Mario Grier:  MRN: [id number]    This is a follow up to your recent labs, your lab results were stable.  There are no medicine changes.  Please have your labs drawn again on 9/11/23.      If you cannot have your labs drawn on the scheduled date, it is your responsibility to call the transplant department to reschedule.  Please call [phone] and ask to speak to Shari PRIETO -  for all scheduling requests.     Sincerely,    Marybeth Ruiz, RN,BSN,CCTC    Your Liver Transplant Coordinator    [facility]  [address] [place] [address] [phone]

## 2023-09-28 ENCOUNTER — PATIENT MESSAGE (OUTPATIENT)
Dept: TRANSPLANT | Facility: CLINIC | Age: 68
End: 2023-09-28
Payer: MEDICARE

## 2023-10-26 ENCOUNTER — HOSPITAL ENCOUNTER (OUTPATIENT)
Dept: RADIOLOGY | Facility: HOSPITAL | Age: 68
Discharge: HOME | End: 2023-10-26
Attending: INTERNAL MEDICINE
Payer: MEDICARE

## 2023-10-26 DIAGNOSIS — Z85.05 PERSONAL HISTORY OF LIVER CANCER: ICD-10-CM

## 2023-10-26 DIAGNOSIS — Z94.4 LIVER REPLACED BY TRANSPLANT: ICD-10-CM

## 2023-10-26 PROCEDURE — 76705 ECHO EXAM OF ABDOMEN: CPT | Mod: 26,59,, | Performed by: RADIOLOGY

## 2023-10-26 PROCEDURE — 93976 US DOPPLER LIVER TRANSPLANT POST (XPD): ICD-10-PCS | Mod: 26,,, | Performed by: RADIOLOGY

## 2023-10-26 PROCEDURE — 76705 ECHO EXAM OF ABDOMEN: CPT | Mod: TC,PO

## 2023-10-26 PROCEDURE — 76705 US DOPPLER LIVER TRANSPLANT POST (XPD): ICD-10-PCS | Mod: 26,59,, | Performed by: RADIOLOGY

## 2023-10-26 PROCEDURE — 93976 VASCULAR STUDY: CPT | Mod: TC,PO

## 2023-10-26 PROCEDURE — 93976 VASCULAR STUDY: CPT | Mod: 26,,, | Performed by: RADIOLOGY

## 2023-10-27 ENCOUNTER — TELEPHONE (OUTPATIENT)
Dept: TRANSPLANT | Facility: CLINIC | Age: 68
End: 2023-10-27
Payer: MEDICARE

## 2023-10-27 DIAGNOSIS — Z94.4 LIVER REPLACED BY TRANSPLANT: Primary | ICD-10-CM

## 2023-10-27 DIAGNOSIS — Z85.05 PERSONAL HISTORY OF LIVER CANCER: ICD-10-CM

## 2023-10-27 NOTE — LETTER
October 27, 2023    Mario Marvel  [address]  [place] [address]          Dear Mario Grier:  MRN: [id number]    This is a follow up to your recent labs, your lab results were stable.  There are no medicine changes.  Please have your labs drawn again on 1/29/24.      If you cannot have your labs drawn on the scheduled date, it is your responsibility to call the transplant department to reschedule.  Please call [phone] and ask to speak to Shari PRIETO -  for all scheduling requests.     Sincerely,    Marybeth Ruiz, RN,BSN,CCTC     Your Liver Transplant Coordinator    [facility]  [address] [place] [address] [phone]

## 2023-10-27 NOTE — TELEPHONE ENCOUNTER
Labs reviewed and are stable, continue q 3 months. Letter sent.       ----- Message from Jim Ball MD sent at 10/27/2023 11:59 AM CDT -----  Results reviewed. No action.

## 2023-11-16 ENCOUNTER — TELEPHONE (OUTPATIENT)
Dept: TRANSPLANT | Facility: CLINIC | Age: 68
End: 2023-11-16
Payer: MEDICARE

## 2023-12-11 ENCOUNTER — HOSPITAL ENCOUNTER (OUTPATIENT)
Dept: RADIOLOGY | Facility: HOSPITAL | Age: 68
Discharge: HOME | End: 2023-12-11
Attending: INTERNAL MEDICINE
Payer: MEDICARE

## 2023-12-11 DIAGNOSIS — Z85.05 PERSONAL HISTORY OF LIVER CANCER: ICD-10-CM

## 2023-12-11 DIAGNOSIS — Z94.4 LIVER REPLACED BY TRANSPLANT: ICD-10-CM

## 2023-12-11 PROCEDURE — 74170 CT ABDOMEN W WO CONTRAST: ICD-10-PCS | Mod: 26,,, | Performed by: RADIOLOGY

## 2023-12-11 PROCEDURE — 74170 CT ABD WO CNTRST FLWD CNTRST: CPT | Mod: 26,,, | Performed by: RADIOLOGY

## 2023-12-11 PROCEDURE — 71260 CT THORAX DX C+: CPT | Mod: TC,PO

## 2023-12-11 PROCEDURE — 74170 CT ABD WO CNTRST FLWD CNTRST: CPT | Mod: TC,PO

## 2023-12-11 PROCEDURE — 71260 CT CHEST WITH CONTRAST: ICD-10-PCS | Mod: 26,,, | Performed by: RADIOLOGY

## 2023-12-11 PROCEDURE — 71260 CT THORAX DX C+: CPT | Mod: 26,,, | Performed by: RADIOLOGY

## 2023-12-11 PROCEDURE — 25500020 PHARM REV CODE 255: Mod: PO | Performed by: INTERNAL MEDICINE

## 2023-12-11 RX ADMIN — IOHEXOL 75 ML: 350 INJECTION, SOLUTION INTRAVENOUS at 12:12

## 2023-12-12 ENCOUNTER — TELEPHONE (OUTPATIENT)
Dept: TRANSPLANT | Facility: CLINIC | Age: 68
End: 2023-12-12
Payer: MEDICARE

## 2023-12-12 DIAGNOSIS — Z85.05 PERSONAL HISTORY OF LIVER CANCER: ICD-10-CM

## 2023-12-12 DIAGNOSIS — R91.1 LUNG NODULE SEEN ON IMAGING STUDY: ICD-10-CM

## 2023-12-12 DIAGNOSIS — Z94.4 LIVER REPLACED BY TRANSPLANT: Primary | ICD-10-CM

## 2023-12-12 NOTE — TELEPHONE ENCOUNTER
Informed pt of CT scan of chest results. Will make urgent referral to Pulmonary, he verbalizes understanding.         ----- Message from Jim Ball MD sent at 12/12/2023  9:53 AM CST -----  Results reviewed. Needs urgent referral to Dr. Zarate Pulmonary

## 2023-12-12 NOTE — TELEPHONE ENCOUNTER
Labs reviewed and are stable, continue q 3 months. Letter sent.     ----- Message from Jim Ball MD sent at 12/12/2023  9:51 AM CST -----  Results reviewed. No action.

## 2023-12-21 ENCOUNTER — OFFICE VISIT (OUTPATIENT)
Dept: PULMONOLOGY | Facility: CLINIC | Age: 68
End: 2023-12-21
Payer: MEDICARE

## 2023-12-21 VITALS
SYSTOLIC BLOOD PRESSURE: 123 MMHG | HEART RATE: 76 BPM | DIASTOLIC BLOOD PRESSURE: 76 MMHG | BODY MASS INDEX: 20.46 KG/M2 | OXYGEN SATURATION: 94 % | HEIGHT: 66 IN | WEIGHT: 127.31 LBS

## 2023-12-21 DIAGNOSIS — Z94.4 LIVER REPLACED BY TRANSPLANT: ICD-10-CM

## 2023-12-21 DIAGNOSIS — R91.1 LUNG NODULE SEEN ON IMAGING STUDY: ICD-10-CM

## 2023-12-21 DIAGNOSIS — Z85.05 PERSONAL HISTORY OF LIVER CANCER: ICD-10-CM

## 2023-12-21 PROCEDURE — 3078F PR MOST RECENT DIASTOLIC BLOOD PRESSURE < 80 MM HG: ICD-10-PCS | Mod: CPTII,S$GLB,, | Performed by: STUDENT IN AN ORGANIZED HEALTH CARE EDUCATION/TRAINING PROGRAM

## 2023-12-21 PROCEDURE — 3078F DIAST BP <80 MM HG: CPT | Mod: CPTII,S$GLB,, | Performed by: STUDENT IN AN ORGANIZED HEALTH CARE EDUCATION/TRAINING PROGRAM

## 2023-12-21 PROCEDURE — 99999 PR PBB SHADOW E&M-EST. PATIENT-LVL IV: ICD-10-PCS | Mod: PBBFAC,,, | Performed by: STUDENT IN AN ORGANIZED HEALTH CARE EDUCATION/TRAINING PROGRAM

## 2023-12-21 PROCEDURE — 1159F MED LIST DOCD IN RCRD: CPT | Mod: CPTII,S$GLB,, | Performed by: STUDENT IN AN ORGANIZED HEALTH CARE EDUCATION/TRAINING PROGRAM

## 2023-12-21 PROCEDURE — 3008F PR BODY MASS INDEX (BMI) DOCUMENTED: ICD-10-PCS | Mod: CPTII,S$GLB,, | Performed by: STUDENT IN AN ORGANIZED HEALTH CARE EDUCATION/TRAINING PROGRAM

## 2023-12-21 PROCEDURE — 1157F ADVNC CARE PLAN IN RCRD: CPT | Mod: CPTII,S$GLB,, | Performed by: STUDENT IN AN ORGANIZED HEALTH CARE EDUCATION/TRAINING PROGRAM

## 2023-12-21 PROCEDURE — 3074F PR MOST RECENT SYSTOLIC BLOOD PRESSURE < 130 MM HG: ICD-10-PCS | Mod: CPTII,S$GLB,, | Performed by: STUDENT IN AN ORGANIZED HEALTH CARE EDUCATION/TRAINING PROGRAM

## 2023-12-21 PROCEDURE — 1159F PR MEDICATION LIST DOCUMENTED IN MEDICAL RECORD: ICD-10-PCS | Mod: CPTII,S$GLB,, | Performed by: STUDENT IN AN ORGANIZED HEALTH CARE EDUCATION/TRAINING PROGRAM

## 2023-12-21 PROCEDURE — 3288F PR FALLS RISK ASSESSMENT DOCUMENTED: ICD-10-PCS | Mod: CPTII,S$GLB,, | Performed by: STUDENT IN AN ORGANIZED HEALTH CARE EDUCATION/TRAINING PROGRAM

## 2023-12-21 PROCEDURE — 99203 OFFICE O/P NEW LOW 30 MIN: CPT | Mod: S$GLB,,, | Performed by: STUDENT IN AN ORGANIZED HEALTH CARE EDUCATION/TRAINING PROGRAM

## 2023-12-21 PROCEDURE — 1101F PT FALLS ASSESS-DOCD LE1/YR: CPT | Mod: CPTII,S$GLB,, | Performed by: STUDENT IN AN ORGANIZED HEALTH CARE EDUCATION/TRAINING PROGRAM

## 2023-12-21 PROCEDURE — 3008F BODY MASS INDEX DOCD: CPT | Mod: CPTII,S$GLB,, | Performed by: STUDENT IN AN ORGANIZED HEALTH CARE EDUCATION/TRAINING PROGRAM

## 2023-12-21 PROCEDURE — 99203 PR OFFICE/OUTPT VISIT, NEW, LEVL III, 30-44 MIN: ICD-10-PCS | Mod: S$GLB,,, | Performed by: STUDENT IN AN ORGANIZED HEALTH CARE EDUCATION/TRAINING PROGRAM

## 2023-12-21 PROCEDURE — 1157F PR ADVANCE CARE PLAN OR EQUIV PRESENT IN MEDICAL RECORD: ICD-10-PCS | Mod: CPTII,S$GLB,, | Performed by: STUDENT IN AN ORGANIZED HEALTH CARE EDUCATION/TRAINING PROGRAM

## 2023-12-21 PROCEDURE — 1101F PR PT FALLS ASSESS DOC 0-1 FALLS W/OUT INJ PAST YR: ICD-10-PCS | Mod: CPTII,S$GLB,, | Performed by: STUDENT IN AN ORGANIZED HEALTH CARE EDUCATION/TRAINING PROGRAM

## 2023-12-21 PROCEDURE — 99999 PR PBB SHADOW E&M-EST. PATIENT-LVL IV: CPT | Mod: PBBFAC,,, | Performed by: STUDENT IN AN ORGANIZED HEALTH CARE EDUCATION/TRAINING PROGRAM

## 2023-12-21 PROCEDURE — 3074F SYST BP LT 130 MM HG: CPT | Mod: CPTII,S$GLB,, | Performed by: STUDENT IN AN ORGANIZED HEALTH CARE EDUCATION/TRAINING PROGRAM

## 2023-12-21 PROCEDURE — 3288F FALL RISK ASSESSMENT DOCD: CPT | Mod: CPTII,S$GLB,, | Performed by: STUDENT IN AN ORGANIZED HEALTH CARE EDUCATION/TRAINING PROGRAM
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
## 2024-01-12 ENCOUNTER — TELEPHONE (OUTPATIENT)
Dept: PULMONOLOGY | Facility: CLINIC | Age: 69
End: 2024-01-12
Payer: MEDICARE

## 2024-01-12 NOTE — TELEPHONE ENCOUNTER
----- Message from Kadie Mosquera sent at 1/12/2024  2:37 PM CST -----  Contact: pt  Type:  Needs Medical Advice    Who Called: the patient  Symptoms (please be specific):  How long has patient had these symptoms:    Pharmacy name and phone #:    Would the patient rather a call back or a response via [facility]? call back  Best Call Back Number: [phone] or [phone]  Additional Information: pt called in regards to 1/25 appt   Pt states he does not have PET scan until 1/29 and would like to push appt to after  Please advise  Thanks

## 2024-01-12 NOTE — TELEPHONE ENCOUNTER
Spoke to patient.  He wanted to reschedule appt with Dr. Pandey after his PET scan.  Rescheduled his 1/25/24 appt to 2/8/24.  Patient verbalized understanding

## 2024-01-15 PROBLEM — R91.1 LUNG NODULE SEEN ON IMAGING STUDY: Status: ACTIVE | Noted: 2024-01-15

## 2024-01-29 ENCOUNTER — HOSPITAL ENCOUNTER (OUTPATIENT)
Dept: RADIOLOGY | Facility: HOSPITAL | Age: 69
Discharge: HOME | End: 2024-01-29
Attending: STUDENT IN AN ORGANIZED HEALTH CARE EDUCATION/TRAINING PROGRAM
Payer: MEDICARE

## 2024-01-29 DIAGNOSIS — R91.1 LUNG NODULE SEEN ON IMAGING STUDY: ICD-10-CM

## 2024-01-29 LAB — GLUCOSE SERPL-MCNC: 85 MG/DL (ref 70–110)

## 2024-01-29 PROCEDURE — 78815 PET IMAGE W/CT SKULL-THIGH: CPT | Mod: TC,PO

## 2024-01-29 PROCEDURE — 82962 GLUCOSE BLOOD TEST: CPT | Mod: PO

## 2024-01-30 ENCOUNTER — PATIENT MESSAGE (OUTPATIENT)
Dept: PULMONOLOGY | Facility: HOSPITAL | Age: 69
End: 2024-01-30

## 2024-01-30 ENCOUNTER — TELEPHONE (OUTPATIENT)
Dept: PULMONOLOGY | Facility: CLINIC | Age: 69
End: 2024-01-30
Payer: MEDICARE

## 2024-01-30 NOTE — TELEPHONE ENCOUNTER
----- Message from Catina Grider sent at 1/30/2024  1:02 PM CST -----  Type:  Test Results    Who Called:  patient  Name of Test (Lab/Mammo/Etc):  pet scan  Date of Test:  1/29  Ordering Provider:  geoffrey  Where the test was performed:    Best Call Back Number:  [phone] (home)   Additional Information:

## 2024-01-30 NOTE — TELEPHONE ENCOUNTER
Called to discuss PET scan with Mr Grier. No answer, left VM. Will plan on tumor board presentation to discuss surgery vs trans thoracic biopsy vs robotics.     Alexander Pandey MD

## 2024-01-30 NOTE — TELEPHONE ENCOUNTER
Pt was informed of providers msg  pt also wanted dr to know that last week he had really bad kidney stones so not sure if anything will show up  advised pt that it appears that the only spot was in the RUL and that it also showed kidney stones  pt also stated dr could send messages to his portal if needed

## 2024-01-31 NOTE — TELEPHONE ENCOUNTER
Discussed with him PET findings. Plan for tumor board presentation.     He reports recently passed a kidney stone- he thinks it is resolved now.     I encouraged PCP- he may need this further evaluated.     Lung function good on PFT- he seem sot have good functional status.     - Alexander Pandey MD

## 2024-02-05 ENCOUNTER — PATIENT MESSAGE (OUTPATIENT)
Dept: PULMONOLOGY | Facility: CLINIC | Age: 69
End: 2024-02-05
Payer: MEDICARE

## 2024-02-06 ENCOUNTER — TUMOR BOARD CONFERENCE (OUTPATIENT)
Dept: ONCOLOGY | Facility: CLINIC | Age: 69
End: 2024-02-06

## 2024-02-06 DIAGNOSIS — J98.4 CAVITARY LESION OF LUNG: Primary | ICD-10-CM

## 2024-02-07 ENCOUNTER — PATIENT MESSAGE (OUTPATIENT)
Dept: PULMONOLOGY | Facility: CLINIC | Age: 69
End: 2024-02-07
Payer: MEDICARE

## 2024-02-07 ENCOUNTER — LAB VISIT (OUTPATIENT)
Dept: LAB | Facility: HOSPITAL | Age: 69
End: 2024-02-07
Attending: STUDENT IN AN ORGANIZED HEALTH CARE EDUCATION/TRAINING PROGRAM
Payer: MEDICARE

## 2024-02-07 DIAGNOSIS — R91.1 LUNG NODULE SEEN ON IMAGING STUDY: ICD-10-CM

## 2024-02-07 PROCEDURE — 86480 TB TEST CELL IMMUN MEASURE: CPT | Performed by: STUDENT IN AN ORGANIZED HEALTH CARE EDUCATION/TRAINING PROGRAM

## 2024-02-08 NOTE — TELEPHONE ENCOUNTER
Spoke with patient after tumor board presentatino- plan for IR guided biopsy of the RUL mass and TB rule out. I have placed order. He will call me if he does not have an appointment by next week

## 2024-02-09 LAB
GAMMA INTERFERON BACKGROUND BLD IA-ACNC: 0.03 IU/ML
M TB IFN-G CD4+ BCKGRND COR BLD-ACNC: -0 IU/ML
M TB IFN-G CD4+ BCKGRND COR BLD-ACNC: -0 IU/ML
MITOGEN IGNF BCKGRD COR BLD-ACNC: 9.97 IU/ML
TB GOLD PLUS: NEGATIVE

## 2024-02-13 ENCOUNTER — PATIENT MESSAGE (OUTPATIENT)
Dept: TRANSPLANT | Facility: CLINIC | Age: 69
End: 2024-02-13
Payer: MEDICARE

## 2024-02-14 ENCOUNTER — TELEPHONE (OUTPATIENT)
Dept: PULMONOLOGY | Facility: CLINIC | Age: 69
End: 2024-02-14
Payer: MEDICARE

## 2024-02-14 DIAGNOSIS — Z94.4 LIVER REPLACED BY TRANSPLANT: ICD-10-CM

## 2024-02-14 DIAGNOSIS — J98.4 CAVITARY LESION OF LUNG: Primary | ICD-10-CM

## 2024-02-14 DIAGNOSIS — R91.1 LUNG NODULE SEEN ON IMAGING STUDY: ICD-10-CM

## 2024-02-14 NOTE — TELEPHONE ENCOUNTER
I have spoke to patient regarding this. I have routed rx for bx, to MD to sign     ----- Message from Patricia Prescott sent at 2/14/2024 10:03 AM CST -----  Contact: self  Type:  Needs Medical Advice    Who Called: self  Symptoms (please be specific):  needs to speak to nurse or dr about appt that was cancelled and his lung biopsy.    Would the patient rather a call back or a response via [facility]? call  Best Call Back Number: [phone] (home)     Additional Information: please advise and thank you.

## 2024-02-14 NOTE — TELEPHONE ENCOUNTER
Spoke to patient waiting on MD to sign orders  ----- Message from Talia Norris sent at 2/14/2024  4:15 PM CST -----  Regarding: Returning phone call  Contact: pt  Type:  Patient Returning Call    Who Called:pt    Who Left Message for Patient: nurse    Does the patient know what this is regarding?:appt    Would the patient rather a call back or a response via [facility]? Call back    Best Call Back Number:[phone]    Additional Information: pt is returning a call.   Please advise.  Thank you.

## 2024-02-15 DIAGNOSIS — J98.4 CAVITARY LESION OF LUNG: Primary | ICD-10-CM

## 2024-03-04 ENCOUNTER — TELEPHONE (OUTPATIENT)
Dept: PULMONOLOGY | Facility: HOSPITAL | Age: 69
End: 2024-03-04

## 2024-03-04 ENCOUNTER — PATIENT MESSAGE (OUTPATIENT)
Dept: PULMONOLOGY | Facility: HOSPITAL | Age: 69
End: 2024-03-04

## 2024-03-04 DIAGNOSIS — C34.91 PRIMARY ADENOCARCINOMA OF RIGHT LUNG: Primary | ICD-10-CM

## 2024-03-07 DIAGNOSIS — C34.91 PRIMARY ADENOCARCINOMA OF RIGHT LUNG: Primary | ICD-10-CM

## 2024-03-07 DIAGNOSIS — C34.90 MALIGNANT NEOPLASM OF LUNG, UNSPECIFIED LATERALITY, UNSPECIFIED PART OF LUNG: ICD-10-CM

## 2024-03-12 ENCOUNTER — TELEPHONE (OUTPATIENT)
Dept: HEMATOLOGY/ONCOLOGY | Facility: CLINIC | Age: 69
End: 2024-03-12
Payer: MEDICARE

## 2024-03-12 ENCOUNTER — PATIENT MESSAGE (OUTPATIENT)
Dept: TRANSPLANT | Facility: CLINIC | Age: 69
End: 2024-03-12
Payer: MEDICARE

## 2024-03-12 ENCOUNTER — PATIENT MESSAGE (OUTPATIENT)
Dept: HEMATOLOGY/ONCOLOGY | Facility: CLINIC | Age: 69
End: 2024-03-12
Payer: MEDICARE

## 2024-03-12 DIAGNOSIS — C34.90 MALIGNANT NEOPLASM OF UNSPECIFIED PART OF UNSPECIFIED BRONCHUS OR LUNG: Primary | ICD-10-CM

## 2024-03-12 NOTE — NURSING
Returned call to patient's chart. We were unaware of patient's oncology referral. I discussed with patient, and per our algorithm, patient should be scheduled with thoracic surgery.  ZANE Ramirez, reviewed patient's chart and approved for patient to be scheduled on Tuesday, March 19th at 0830. She asks for patient to get an updated CT Chest without contrast. Will coordinate ct scan for patient.   Date, time, and location confirmed with patient.    Oncology Navigation   Intake  Date of Diagnosis: 03/01/24  Cancer Type: Thoracic (lung adenocarcinoma, lepidic type)  Type of Referral: External  Date of Referral: 03/04/24  Initial Nurse Navigator Contact: 03/12/24  Referral to Initial Contact Timeline (days): 8  Date Worked: 03/12/24  First Appointment Available: 03/19/24  Appointment Date: 03/19/24  First Available Date vs. Scheduled Date (days): 0     Treatment  Current Status: Active  Date Presented to Tumor Board: 02/06/24    Surgical Oncologist: Dr. Hernadez (thoracic surgery)  Consult Date: 03/19/24          Procedures: Biopsy; PET scan  Biopsy Schedule Date: 02/28/24  PET Scan Schedule Date: 01/29/24                 Acuity      Follow Up  No follow-ups on file.

## 2024-03-12 NOTE — TELEPHONE ENCOUNTER
----- Message from Veronica Peres, Patient Care Assistant sent at 3/11/2024  3:49 PM CDT -----  Type: Needs Medical Advice  Who Called:  Mario Lima Call Back Number: [phone]    Additional Information: Mario is checking on status of referral for PULMONOLOGY and would like it in [place] if possible , he states he has been waiting for over a month ,please call to further discuss thank you .

## 2024-03-15 ENCOUNTER — HOSPITAL ENCOUNTER (OUTPATIENT)
Dept: RADIOLOGY | Facility: HOSPITAL | Age: 69
Discharge: HOME | End: 2024-03-15
Attending: PHYSICIAN ASSISTANT
Payer: MEDICARE

## 2024-03-15 DIAGNOSIS — C34.90 MALIGNANT NEOPLASM OF UNSPECIFIED PART OF UNSPECIFIED BRONCHUS OR LUNG: ICD-10-CM

## 2024-03-15 PROCEDURE — 71250 CT THORAX DX C-: CPT | Mod: 26,,, | Performed by: RADIOLOGY

## 2024-03-15 PROCEDURE — 71250 CT THORAX DX C-: CPT | Mod: TC,PO
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
## 2024-03-19 ENCOUNTER — TELEPHONE (OUTPATIENT)
Dept: PULMONOLOGY | Facility: CLINIC | Age: 69
End: 2024-03-19
Payer: MEDICARE

## 2024-03-19 ENCOUNTER — TELEPHONE (OUTPATIENT)
Dept: HEMATOLOGY/ONCOLOGY | Facility: CLINIC | Age: 69
End: 2024-03-19
Payer: MEDICARE

## 2024-03-19 ENCOUNTER — PATIENT MESSAGE (OUTPATIENT)
Dept: CARDIOTHORACIC SURGERY | Facility: CLINIC | Age: 69
End: 2024-03-19
Payer: MEDICARE

## 2024-03-19 ENCOUNTER — LAB VISIT (OUTPATIENT)
Dept: LAB | Facility: HOSPITAL | Age: 69
End: 2024-03-19
Attending: THORACIC SURGERY (CARDIOTHORACIC VASCULAR SURGERY)
Payer: MEDICARE

## 2024-03-19 ENCOUNTER — OFFICE VISIT (OUTPATIENT)
Dept: PULMONOLOGY | Facility: CLINIC | Age: 69
End: 2024-03-19
Payer: MEDICARE

## 2024-03-19 VITALS
SYSTOLIC BLOOD PRESSURE: 176 MMHG | HEIGHT: 66 IN | RESPIRATION RATE: 17 BRPM | DIASTOLIC BLOOD PRESSURE: 90 MMHG | TEMPERATURE: 98 F | WEIGHT: 120.38 LBS | BODY MASS INDEX: 19.35 KG/M2 | HEART RATE: 56 BPM | OXYGEN SATURATION: 95 %

## 2024-03-19 DIAGNOSIS — F17.200 SMOKER: Primary | ICD-10-CM

## 2024-03-19 DIAGNOSIS — D68.9 COAGULOPATHY: ICD-10-CM

## 2024-03-19 DIAGNOSIS — Z85.118 HISTORY OF LUNG CANCER: ICD-10-CM

## 2024-03-19 DIAGNOSIS — Z01.810 PRE-OPERATIVE CARDIOVASCULAR EXAMINATION: Primary | ICD-10-CM

## 2024-03-19 DIAGNOSIS — C34.91 PRIMARY ADENOCARCINOMA OF RIGHT LUNG: ICD-10-CM

## 2024-03-19 DIAGNOSIS — C34.91 PRIMARY ADENOCARCINOMA OF RIGHT LUNG: Primary | ICD-10-CM

## 2024-03-19 LAB
ALBUMIN SERPL BCP-MCNC: 3.9 G/DL (ref 3.5–5.2)
ALP SERPL-CCNC: 81 U/L (ref 55–135)
ALT SERPL W/O P-5'-P-CCNC: 18 U/L (ref 10–44)
ANION GAP SERPL CALC-SCNC: 8 MMOL/L (ref 8–16)
APTT PPP: 33 SEC (ref 21–32)
AST SERPL-CCNC: 24 U/L (ref 10–40)
BASOPHILS # BLD AUTO: 0.02 K/UL (ref 0–0.2)
BASOPHILS NFR BLD: 0.4 % (ref 0–1.9)
BILIRUB SERPL-MCNC: 0.7 MG/DL (ref 0.1–1)
BUN SERPL-MCNC: 40 MG/DL (ref 8–23)
CALCIUM SERPL-MCNC: 8.3 MG/DL (ref 8.7–10.5)
CHLORIDE SERPL-SCNC: 104 MMOL/L (ref 95–110)
CO2 SERPL-SCNC: 26 MMOL/L (ref 23–29)
CREAT SERPL-MCNC: 1.1 MG/DL (ref 0.5–1.4)
DIFFERENTIAL METHOD BLD: ABNORMAL
EOSINOPHIL # BLD AUTO: 0.2 K/UL (ref 0–0.5)
EOSINOPHIL NFR BLD: 3.5 % (ref 0–8)
ERYTHROCYTE [DISTWIDTH] IN BLOOD BY AUTOMATED COUNT: 12.1 % (ref 11.5–14.5)
EST. GFR  (NO RACE VARIABLE): >60 ML/MIN/1.73 M^2
GLUCOSE SERPL-MCNC: 97 MG/DL (ref 70–110)
HCT VFR BLD AUTO: 35.3 % (ref 40–54)
HGB BLD-MCNC: 12.5 G/DL (ref 14–18)
IMM GRANULOCYTES # BLD AUTO: 0.01 K/UL (ref 0–0.04)
IMM GRANULOCYTES NFR BLD AUTO: 0.2 % (ref 0–0.5)
INR PPP: 1.1 (ref 0.8–1.2)
LYMPHOCYTES # BLD AUTO: 0.7 K/UL (ref 1–4.8)
LYMPHOCYTES NFR BLD: 15.8 % (ref 18–48)
MCH RBC QN AUTO: 34.3 PG (ref 27–31)
MCHC RBC AUTO-ENTMCNC: 35.4 G/DL (ref 32–36)
MCV RBC AUTO: 97 FL (ref 82–98)
MONOCYTES # BLD AUTO: 0.7 K/UL (ref 0.3–1)
MONOCYTES NFR BLD: 15.6 % (ref 4–15)
NEUTROPHILS # BLD AUTO: 3 K/UL (ref 1.8–7.7)
NEUTROPHILS NFR BLD: 64.5 % (ref 38–73)
NRBC BLD-RTO: 0 /100 WBC
PLATELET # BLD AUTO: 112 K/UL (ref 150–450)
PMV BLD AUTO: 11.4 FL (ref 9.2–12.9)
POTASSIUM SERPL-SCNC: 4.4 MMOL/L (ref 3.5–5.1)
PREALB SERPL-MCNC: 20 MG/DL (ref 20–43)
PROT SERPL-MCNC: 6.6 G/DL (ref 6–8.4)
PROTHROMBIN TIME: 12 SEC (ref 9–12.5)
RBC # BLD AUTO: 3.64 M/UL (ref 4.6–6.2)
SODIUM SERPL-SCNC: 138 MMOL/L (ref 136–145)
WBC # BLD AUTO: 4.62 K/UL (ref 3.9–12.7)

## 2024-03-19 PROCEDURE — 3288F FALL RISK ASSESSMENT DOCD: CPT | Mod: CPTII,S$GLB,, | Performed by: THORACIC SURGERY (CARDIOTHORACIC VASCULAR SURGERY)

## 2024-03-19 PROCEDURE — 1126F AMNT PAIN NOTED NONE PRSNT: CPT | Mod: CPTII,S$GLB,, | Performed by: THORACIC SURGERY (CARDIOTHORACIC VASCULAR SURGERY)

## 2024-03-19 PROCEDURE — 99205 OFFICE O/P NEW HI 60 MIN: CPT | Mod: S$GLB,,, | Performed by: THORACIC SURGERY (CARDIOTHORACIC VASCULAR SURGERY)

## 2024-03-19 PROCEDURE — 3080F DIAST BP >= 90 MM HG: CPT | Mod: CPTII,S$GLB,, | Performed by: THORACIC SURGERY (CARDIOTHORACIC VASCULAR SURGERY)

## 2024-03-19 PROCEDURE — 85730 THROMBOPLASTIN TIME PARTIAL: CPT | Performed by: PHYSICIAN ASSISTANT

## 2024-03-19 PROCEDURE — 84134 ASSAY OF PREALBUMIN: CPT | Performed by: PHYSICIAN ASSISTANT

## 2024-03-19 PROCEDURE — 99999 PR PBB SHADOW E&M-EST. PATIENT-LVL V: CPT | Mod: PBBFAC,,, | Performed by: THORACIC SURGERY (CARDIOTHORACIC VASCULAR SURGERY)

## 2024-03-19 PROCEDURE — 3008F BODY MASS INDEX DOCD: CPT | Mod: CPTII,S$GLB,, | Performed by: THORACIC SURGERY (CARDIOTHORACIC VASCULAR SURGERY)

## 2024-03-19 PROCEDURE — 85025 COMPLETE CBC W/AUTO DIFF WBC: CPT | Mod: PN | Performed by: PHYSICIAN ASSISTANT

## 2024-03-19 PROCEDURE — 1101F PT FALLS ASSESS-DOCD LE1/YR: CPT | Mod: CPTII,S$GLB,, | Performed by: THORACIC SURGERY (CARDIOTHORACIC VASCULAR SURGERY)

## 2024-03-19 PROCEDURE — 85610 PROTHROMBIN TIME: CPT | Performed by: PHYSICIAN ASSISTANT

## 2024-03-19 PROCEDURE — 1159F MED LIST DOCD IN RCRD: CPT | Mod: CPTII,S$GLB,, | Performed by: THORACIC SURGERY (CARDIOTHORACIC VASCULAR SURGERY)

## 2024-03-19 PROCEDURE — 36415 COLL VENOUS BLD VENIPUNCTURE: CPT | Mod: PN | Performed by: PHYSICIAN ASSISTANT

## 2024-03-19 PROCEDURE — 80053 COMPREHEN METABOLIC PANEL: CPT | Mod: PN | Performed by: PHYSICIAN ASSISTANT

## 2024-03-19 PROCEDURE — 3077F SYST BP >= 140 MM HG: CPT | Mod: CPTII,S$GLB,, | Performed by: THORACIC SURGERY (CARDIOTHORACIC VASCULAR SURGERY)

## 2024-03-19 PROCEDURE — 1157F ADVNC CARE PLAN IN RCRD: CPT | Mod: CPTII,S$GLB,, | Performed by: THORACIC SURGERY (CARDIOTHORACIC VASCULAR SURGERY)

## 2024-03-19 RX ORDER — FLUOROURACIL 50 MG/G
CREAM TOPICAL
COMMUNITY
Start: 2024-01-24 | End: 2024-04-04

## 2024-03-19 RX ORDER — CYPROHEPTADINE HYDROCHLORIDE 4 MG/1
4 TABLET ORAL 3 TIMES DAILY PRN
Qty: 90 TABLET | Refills: 0 | Status: SHIPPED | OUTPATIENT
Start: 2024-03-19 | End: 2024-04-04

## 2024-03-19 NOTE — TELEPHONE ENCOUNTER
Called and spoke to patient. Told him the date/time of his PREOP appt. (4/4@11:00 am) and the date and Time of his EBUS (4/8@9:45 am). He confirmed that he would be there.

## 2024-03-19 NOTE — PROGRESS NOTES
Seen by Dr Hernadez today- plan for tentative lobectomy pending EBUS and pre op clearance.   I called the patient- will schedule him for EBUS on April 8th 2024.     Alexander Pandey MD

## 2024-03-19 NOTE — NURSING
Met with patient and wife in multi disciplinary clinic today with Dr. Hernadez.  Explained my role as navigator.  Reviewed plan of care and answered questions.    Dr. Hernadez's plan is to obtain pre op labs, head CT with and without contrast (patient unable to have MRI), stress test, and undergo an EBUS for lymph node evaluation.  Labs and CT scan scheduled for patient. Message sent to Dr. Pandey's staff about setting up patient for an EBUS. Dr. Hernadez's nurse will set up stress test for patient.  My contact information was provided and pt was encouraged to call with any questions or concerns.  Pt and wife verbalized understanding.

## 2024-03-22 ENCOUNTER — PATIENT MESSAGE (OUTPATIENT)
Dept: TRANSPLANT | Facility: CLINIC | Age: 69
End: 2024-03-22
Payer: MEDICARE

## 2024-03-25 ENCOUNTER — HOSPITAL ENCOUNTER (OUTPATIENT)
Dept: CARDIOLOGY | Facility: HOSPITAL | Age: 69
Discharge: HOME | End: 2024-03-25
Attending: PHYSICIAN ASSISTANT
Payer: MEDICARE

## 2024-03-25 VITALS
HEART RATE: 45 BPM | WEIGHT: 120 LBS | HEIGHT: 66 IN | DIASTOLIC BLOOD PRESSURE: 90 MMHG | BODY MASS INDEX: 19.29 KG/M2 | SYSTOLIC BLOOD PRESSURE: 176 MMHG

## 2024-03-25 DIAGNOSIS — Z01.810 PRE-OPERATIVE CARDIOVASCULAR EXAMINATION: ICD-10-CM

## 2024-03-25 LAB
AORTIC ROOT ANNULUS: 2.9 CM
AORTIC VALVE CUSP SEPERATION: 1.97 CM
ASCENDING AORTA: 2.82 CM
AV INDEX (PROSTH): 0.94
AV MEAN GRADIENT: 4 MMHG
AV PEAK GRADIENT: 6 MMHG
AV VALVE AREA BY VELOCITY RATIO: 3.36 CM²
AV VALVE AREA: 3.37 CM²
AV VELOCITY RATIO: 0.94
BSA FOR ECHO PROCEDURE: 1.59 M2
CV ECHO LV RWT: 0.54 CM
DOP CALC AO PEAK VEL: 1.24 M/S
DOP CALC AO VTI: 29.3 CM
DOP CALC LVOT AREA: 3.6 CM2
DOP CALC LVOT DIAMETER: 2.14 CM
DOP CALC LVOT PEAK VEL: 1.16 M/S
DOP CALC LVOT STROKE VOLUME: 98.86 CM3
DOP CALC MV VTI: 59.5 CM
DOP CALCLVOT PEAK VEL VTI: 27.5 CM
E WAVE DECELERATION TIME: 213.54 MSEC
E/A RATIO: 1.51
E/E' RATIO: 10.73 M/S
ECHO LV POSTERIOR WALL: 1.08 CM (ref 0.6–1.1)
FRACTIONAL SHORTENING: 37 % (ref 28–44)
INTERVENTRICULAR SEPTUM: 1.13 CM (ref 0.6–1.1)
IVC DIAMETER: 1.82 CM
LA MAJOR: 4.47 CM
LA MINOR: 4.84 CM
LA WIDTH: 4.1 CM
LEFT ATRIUM SIZE: 3.36 CM
LEFT ATRIUM VOLUME INDEX MOD: 37.5 ML/M2
LEFT ATRIUM VOLUME INDEX: 33.8 ML/M2
LEFT ATRIUM VOLUME MOD: 60.36 CM3
LEFT ATRIUM VOLUME: 54.42 CM3
LEFT INTERNAL DIMENSION IN SYSTOLE: 2.5 CM (ref 2.1–4)
LEFT VENTRICLE DIASTOLIC VOLUME INDEX: 42.94 ML/M2
LEFT VENTRICLE DIASTOLIC VOLUME: 69.13 ML
LEFT VENTRICLE MASS INDEX: 90 G/M2
LEFT VENTRICLE SYSTOLIC VOLUME INDEX: 13.8 ML/M2
LEFT VENTRICLE SYSTOLIC VOLUME: 22.25 ML
LEFT VENTRICULAR INTERNAL DIMENSION IN DIASTOLE: 3.98 CM (ref 3.5–6)
LEFT VENTRICULAR MASS: 145.48 G
LV LATERAL E/E' RATIO: 10.73 M/S
LV SEPTAL E/E' RATIO: 10.73 M/S
LVOT MG: 2.93 MMHG
LVOT MV: 0.81 CM/S
MV A" WAVE DURATION": 175.07 MSEC
MV MEAN GRADIENT: 2 MMHG
MV PEAK A VEL: 0.78 M/S
MV PEAK E VEL: 1.18 M/S
MV PEAK GRADIENT: 6 MMHG
MV STENOSIS PRESSURE HALF TIME: 61.93 MS
MV VALVE AREA BY CONTINUITY EQUATION: 1.66 CM2
MV VALVE AREA P 1/2 METHOD: 3.55 CM2
PISA MRMAX VEL: 4.8 M/S
PISA TR MAX VEL: 2.38 M/S
PULM VEIN S/D RATIO: 1.4
PV MV: 0.53 M/S
PV PEAK D VEL: 0.42 M/S
PV PEAK GRADIENT: 2 MMHG
PV PEAK S VEL: 0.59 M/S
PV PEAK VELOCITY: 0.66 M/S
RA MAJOR: 4.53 CM
RA PRESSURE ESTIMATED: 3 MMHG
RA WIDTH: 4.21 CM
RIGHT VENTRICULAR END-DIASTOLIC DIMENSION: 1.95 CM
RV TB RVSP: 5 MMHG
STJ: 2.84 CM
TDI LATERAL: 0.11 M/S
TDI SEPTAL: 0.11 M/S
TDI: 0.11 M/S
TR MAX PG: 23 MMHG
TRICUSPID ANNULAR PLANE SYSTOLIC EXCURSION: 2.91 CM
TV REST PULMONARY ARTERY PRESSURE: 26 MMHG
Z-SCORE OF LEFT VENTRICULAR DIMENSION IN END DIASTOLE: -1.38
Z-SCORE OF LEFT VENTRICULAR DIMENSION IN END SYSTOLE: -0.99

## 2024-03-25 PROCEDURE — 93306 TTE W/DOPPLER COMPLETE: CPT | Mod: 26,,, | Performed by: INTERNAL MEDICINE

## 2024-03-25 PROCEDURE — 93306 TTE W/DOPPLER COMPLETE: CPT | Mod: PO

## 2024-03-26 ENCOUNTER — HOSPITAL ENCOUNTER (OUTPATIENT)
Dept: RADIOLOGY | Facility: HOSPITAL | Age: 69
Discharge: HOME | End: 2024-03-26
Attending: PHYSICIAN ASSISTANT
Payer: MEDICARE

## 2024-03-26 DIAGNOSIS — C34.91 PRIMARY ADENOCARCINOMA OF RIGHT LUNG: ICD-10-CM

## 2024-03-26 PROCEDURE — 25500020 PHARM REV CODE 255: Mod: PO | Performed by: PHYSICIAN ASSISTANT

## 2024-03-26 PROCEDURE — 70470 CT HEAD/BRAIN W/O & W/DYE: CPT | Mod: 26,,, | Performed by: RADIOLOGY

## 2024-03-26 PROCEDURE — 70470 CT HEAD/BRAIN W/O & W/DYE: CPT | Mod: TC,PO

## 2024-03-26 RX ADMIN — IOHEXOL 75 ML: 350 INJECTION, SOLUTION INTRAVENOUS at 09:03

## 2024-04-03 ENCOUNTER — PATIENT MESSAGE (OUTPATIENT)
Dept: CARDIOTHORACIC SURGERY | Facility: CLINIC | Age: 69
End: 2024-04-03
Payer: MEDICARE

## 2024-04-03 DIAGNOSIS — Z01.810 PRE-OPERATIVE CARDIOVASCULAR EXAMINATION: Primary | ICD-10-CM

## 2024-04-03 DIAGNOSIS — C34.91 ADENOCARCINOMA OF RIGHT LUNG: Primary | ICD-10-CM

## 2024-04-04 ENCOUNTER — HOSPITAL ENCOUNTER (OUTPATIENT)
Dept: PREADMISSION TESTING | Facility: HOSPITAL | Age: 69
Discharge: HOME | End: 2024-04-04
Attending: STUDENT IN AN ORGANIZED HEALTH CARE EDUCATION/TRAINING PROGRAM
Payer: MEDICARE

## 2024-04-04 VITALS
HEART RATE: 60 BPM | BODY MASS INDEX: 20.09 KG/M2 | TEMPERATURE: 98 F | WEIGHT: 125 LBS | DIASTOLIC BLOOD PRESSURE: 65 MMHG | HEIGHT: 66 IN | SYSTOLIC BLOOD PRESSURE: 167 MMHG | OXYGEN SATURATION: 99 % | RESPIRATION RATE: 18 BRPM

## 2024-04-04 NOTE — DISCHARGE INSTRUCTIONS
To confirm, Your doctor has instructed you that procedure is scheduled for: Monday 4/8/2024        Endoscopy nurse will call the afternoon prior to surgery with your arrival time.      Please  Enter at [facility] [address] and come through front entrance.       Check in at registration.     After registration, proceed past gift shop and through glass door ( [facility]) Check in at the nurses station to the left.     FOLLOW CLEAR LIQUIDS AND COLON PREP AS DIRECTED BY PHYSICIAN      Do not eat or drink anything after midnight the night before your surgery - THIS INCLUDES  WATER, GUM, MINTS AND CANDY.  YOU MAY BRUSH YOUR TEETH BUT DO NOT SWALLOW       TAKE ONLY THESE MEDICATIONS WITH A SMALL SIP OF WATER THE MORNING OF YOUR PROCEDURE: see list: TAKE Liver medicines        PLEASE NOTE:  The surgery schedule has many variables which may affect the time of your surgery case.  Family members should be available if your surgery time changes.  Plan to be here the day of your procedure between 4-6 hours.      DO NOT TAKE THESE MEDICATIONS 5-7 DAYS PRIOR to your procedure or per your surgeon's request: ASPIRIN, ALEVE, ADVIL, IBUPROFEN,  MIGUEL SELTZER, BC , FISH OIL , VITAMIN E, HERBALS  (May take Tylenol)      O                                                      IMPORTANT INSTRUCTIONS      Do not smoke, vape or drink alcoholic beverages 24 hours prior to your procedure.  Shower the night before  and sleep in a bed with clean sheets.  Do not sleep with a pet in the bed.       If you wear contact lenses, dentures, hearing aids or glasses, bring a container to put them in during surgery and give to a family member for safe keeping.    Please leave all jewelry, piercing's and valuables at home.   Wear rubber soled shoes (no flip flops).  ONLY for patients requiring bowel prep, written instructions will be given by your doctor's office.  If your doctor has scheduled you for an overnight stay, bring a small overnight bag with  any personal items you need.    Make arrangements in advance for transportation home by a responsible adult.      You must make arrangements for transportation, TAXI'S, UBER'S OR LYFTS ARE NOT ALLOWED.        If you have any questions about these instructions, call (Monday - Friday)  [id number] [phone]

## 2024-04-04 NOTE — PRE-PROCEDURE INSTRUCTIONS
PAT visit complete, patient  sent to lab from PAT, [facility] and chest CT/CXR done recently. Instructions given per AVS to patient and spouse, questions answered.

## 2024-04-05 ENCOUNTER — PATIENT MESSAGE (OUTPATIENT)
Dept: TRANSPLANT | Facility: CLINIC | Age: 69
End: 2024-04-05
Payer: MEDICARE

## 2024-04-08 ENCOUNTER — TELEPHONE (OUTPATIENT)
Dept: TRANSPLANT | Facility: CLINIC | Age: 69
End: 2024-04-08
Payer: MEDICARE

## 2024-04-08 NOTE — TELEPHONE ENCOUNTER
Labs reviewed and are stable, continue labs q 3 months. Letter sent.       ----- Message from Jim Ball MD sent at 4/7/2024  8:44 PM CDT -----  Results reviewed. No action.

## 2024-04-08 NOTE — LETTER
April 8, 2024    Mario Grier  [address]  [place] [address]          Dear Mario Grier:  MRN: [id number]    This is a follow up to your recent labs, your lab results were stable.  There are no medicine changes.  Please have your labs drawn again on 7/8/24.      If you cannot have your labs drawn on the scheduled date, it is your responsibility to call the transplant department to reschedule.  Please call [phone] and ask to speak to Shari PRIETO -  for all scheduling requests.     Sincerely,    Marybeth Ruiz, RN,BSN,CCTC    Your Liver Transplant Coordinator    [facility]  [address] [place] [id number]  [phone]

## 2024-04-09 ENCOUNTER — PATIENT MESSAGE (OUTPATIENT)
Dept: TRANSPLANT | Facility: CLINIC | Age: 69
End: 2024-04-09
Payer: MEDICARE

## 2024-04-10 ENCOUNTER — ANESTHESIA (OUTPATIENT)
Dept: SURGERY | Facility: HOSPITAL | Age: 69
End: 2024-04-10
Payer: MEDICARE

## 2024-04-10 ENCOUNTER — ANESTHESIA EVENT (OUTPATIENT)
Dept: SURGERY | Facility: HOSPITAL | Age: 69
End: 2024-04-10
Payer: MEDICARE

## 2024-04-10 ENCOUNTER — HOSPITAL ENCOUNTER (OUTPATIENT)
Facility: HOSPITAL | Age: 69
Discharge: HOME | End: 2024-04-10
Attending: STUDENT IN AN ORGANIZED HEALTH CARE EDUCATION/TRAINING PROGRAM | Admitting: STUDENT IN AN ORGANIZED HEALTH CARE EDUCATION/TRAINING PROGRAM
Payer: MEDICARE

## 2024-04-10 VITALS
HEART RATE: 51 BPM | SYSTOLIC BLOOD PRESSURE: 142 MMHG | OXYGEN SATURATION: 99 % | TEMPERATURE: 98 F | DIASTOLIC BLOOD PRESSURE: 68 MMHG | RESPIRATION RATE: 14 BRPM

## 2024-04-10 DIAGNOSIS — C80.1: ICD-10-CM

## 2024-04-10 PROBLEM — C34.91 PRIMARY LUNG ADENOCARCINOMA, RIGHT: Status: ACTIVE | Noted: 2024-04-10

## 2024-04-10 PROCEDURE — 31645 BRNCHSC W/THER ASPIR 1ST: CPT | Performed by: STUDENT IN AN ORGANIZED HEALTH CARE EDUCATION/TRAINING PROGRAM

## 2024-04-10 PROCEDURE — 31653 BRONCH EBUS SAMPLNG 3/> NODE: CPT | Performed by: STUDENT IN AN ORGANIZED HEALTH CARE EDUCATION/TRAINING PROGRAM

## 2024-04-10 PROCEDURE — 63600175 PHARM REV CODE 636 W HCPCS: Performed by: NURSE ANESTHETIST, CERTIFIED REGISTERED

## 2024-04-10 PROCEDURE — D9220A PRA ANESTHESIA: Mod: ANES,,, | Performed by: ANESTHESIOLOGY

## 2024-04-10 PROCEDURE — 31653 BRONCH EBUS SAMPLNG 3/> NODE: CPT | Mod: ,,, | Performed by: STUDENT IN AN ORGANIZED HEALTH CARE EDUCATION/TRAINING PROGRAM

## 2024-04-10 PROCEDURE — 37000009 HC ANESTHESIA EA ADD 15 MINS: Performed by: STUDENT IN AN ORGANIZED HEALTH CARE EDUCATION/TRAINING PROGRAM

## 2024-04-10 PROCEDURE — 25000003 PHARM REV CODE 250: Performed by: NURSE ANESTHETIST, CERTIFIED REGISTERED

## 2024-04-10 PROCEDURE — 31645 BRNCHSC W/THER ASPIR 1ST: CPT | Mod: ,,, | Performed by: STUDENT IN AN ORGANIZED HEALTH CARE EDUCATION/TRAINING PROGRAM

## 2024-04-10 PROCEDURE — 27202053 HC NEEDLE BIOPSY EUS: Performed by: STUDENT IN AN ORGANIZED HEALTH CARE EDUCATION/TRAINING PROGRAM

## 2024-04-10 PROCEDURE — 27000679 HC ADAPTOR, BRONCHOSCOPE: Performed by: STUDENT IN AN ORGANIZED HEALTH CARE EDUCATION/TRAINING PROGRAM

## 2024-04-10 PROCEDURE — 37000008 HC ANESTHESIA 1ST 15 MINUTES: Performed by: STUDENT IN AN ORGANIZED HEALTH CARE EDUCATION/TRAINING PROGRAM

## 2024-04-10 PROCEDURE — D9220A PRA ANESTHESIA: Mod: CRNA,,, | Performed by: NURSE ANESTHETIST, CERTIFIED REGISTERED

## 2024-04-10 RX ORDER — PROPOFOL 10 MG/ML
VIAL (ML) INTRAVENOUS
Status: DISCONTINUED | OUTPATIENT
Start: 2024-04-10 | End: 2024-04-10

## 2024-04-10 RX ORDER — FENTANYL CITRATE 50 UG/ML
25 INJECTION, SOLUTION INTRAMUSCULAR; INTRAVENOUS EVERY 5 MIN PRN
Status: DISCONTINUED | OUTPATIENT
Start: 2024-04-10 | End: 2024-04-12 | Stop reason: HOSPADM

## 2024-04-10 RX ORDER — MEPERIDINE HYDROCHLORIDE 50 MG/ML
12.5 INJECTION INTRAMUSCULAR; INTRAVENOUS; SUBCUTANEOUS EVERY 10 MIN PRN
Status: DISCONTINUED | OUTPATIENT
Start: 2024-04-10 | End: 2024-04-10 | Stop reason: HOSPADM

## 2024-04-10 RX ORDER — PROPOFOL 10 MG/ML
VIAL (ML) INTRAVENOUS CONTINUOUS PRN
Status: DISCONTINUED | OUTPATIENT
Start: 2024-04-10 | End: 2024-04-10

## 2024-04-10 RX ORDER — OXYCODONE HYDROCHLORIDE 5 MG/1
5 TABLET ORAL
Status: DISCONTINUED | OUTPATIENT
Start: 2024-04-10 | End: 2024-04-12 | Stop reason: HOSPADM

## 2024-04-10 RX ORDER — LIDOCAINE HYDROCHLORIDE 10 MG/ML
20 INJECTION INFILTRATION; PERINEURAL ONCE
Status: DISCONTINUED | OUTPATIENT
Start: 2024-04-10 | End: 2024-04-12 | Stop reason: HOSPADM

## 2024-04-10 RX ORDER — SUCCINYLCHOLINE CHLORIDE 20 MG/ML
INJECTION INTRAMUSCULAR; INTRAVENOUS
Status: DISCONTINUED | OUTPATIENT
Start: 2024-04-10 | End: 2024-04-10

## 2024-04-10 RX ORDER — ONDANSETRON HYDROCHLORIDE 2 MG/ML
INJECTION, SOLUTION INTRAVENOUS
Status: DISCONTINUED | OUTPATIENT
Start: 2024-04-10 | End: 2024-04-10

## 2024-04-10 RX ORDER — LIDOCAINE HYDROCHLORIDE 20 MG/ML
INJECTION INTRAVENOUS
Status: DISCONTINUED | OUTPATIENT
Start: 2024-04-10 | End: 2024-04-10

## 2024-04-10 RX ORDER — ONDANSETRON HYDROCHLORIDE 2 MG/ML
4 INJECTION, SOLUTION INTRAVENOUS DAILY PRN
Status: DISCONTINUED | OUTPATIENT
Start: 2024-04-10 | End: 2024-04-12 | Stop reason: HOSPADM

## 2024-04-10 RX ORDER — ROCURONIUM BROMIDE 10 MG/ML
INJECTION, SOLUTION INTRAVENOUS
Status: DISCONTINUED | OUTPATIENT
Start: 2024-04-10 | End: 2024-04-10

## 2024-04-10 RX ORDER — DIPHENHYDRAMINE HYDROCHLORIDE 50 MG/ML
12.5 INJECTION INTRAMUSCULAR; INTRAVENOUS
Status: DISCONTINUED | OUTPATIENT
Start: 2024-04-10 | End: 2024-04-12 | Stop reason: HOSPADM

## 2024-04-10 RX ADMIN — PROPOFOL 150 MG: 10 INJECTION, EMULSION INTRAVENOUS at 08:04

## 2024-04-10 RX ADMIN — LIDOCAINE HYDROCHLORIDE 50 MG: 20 INJECTION, SOLUTION INTRAVENOUS at 08:04

## 2024-04-10 RX ADMIN — ROCURONIUM BROMIDE 5 MG: 10 INJECTION, SOLUTION INTRAVENOUS at 08:04

## 2024-04-10 RX ADMIN — SODIUM CHLORIDE: 0.9 INJECTION, SOLUTION INTRAVENOUS at 08:04

## 2024-04-10 RX ADMIN — ONDANSETRON 4 MG: 2 INJECTION INTRAMUSCULAR; INTRAVENOUS at 09:04

## 2024-04-10 RX ADMIN — SUGAMMADEX 200 MG: 100 INJECTION, SOLUTION INTRAVENOUS at 09:04

## 2024-04-10 RX ADMIN — Medication 120 MG: at 08:04

## 2024-04-10 RX ADMIN — PROPOFOL 150 MCG/KG/MIN: 10 INJECTION, EMULSION INTRAVENOUS at 08:04

## 2024-04-10 RX ADMIN — ROCURONIUM BROMIDE 30 MG: 10 INJECTION, SOLUTION INTRAVENOUS at 08:04
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
## 2024-04-10 NOTE — PROCEDURES
EBUS/BRONCHOSCOPY PROCEDURE    Indication:  Right upper lobe lung adenocarcinoma, here for staging EBUS    Consent: The benefits, risks, and alternatives of the procedure were discussed, and informed consent was obtained from the patient.    Intra-procedural medications: See nurse note. The patient underwent the procedure with general anesthesia; see anesthesiology records for further details.    Time Out: A time out was performed prior initiation of the procedure.    Procedure: The bronchoscope was passed with ease through the ET tube. The patient tolerated the procedure well. The views were adequate.    Findings:  Video Bronchoscopy  *Overall:     First the EBUS scope was inserted into the airway to evaluate for station 11 L, which was 5 mm in size, 1 pass with a 22 gauge needle was performed.    Then the EBUS scope was advanced to station 10 L which was 7 mm in size, 2 passes were made with a 22 gauge needle    Then the EBUS scope was advanced to station 7, it was 8 mm in size, 4 passes were made with a 22 gauge needle    11R was about 3 mm in size, I elected not to biopsy.    10R was not easily visible I elected not to biopsy    All right and left-sided airways were inspected down to subsegmental levels.  All airways were patent without any evidence of endobronchial lesions mucosal abnormalities.  There was no evidence of any ongoing bleeding or oozing.  Secretions were relatively thick, clear and difficult to suctioned.  I was able to remove a collection of mucus located on the right lower lobe, that appeared to be a mucus plug.  Following this all airways continued to appear to remain open.  I also paid close attention to the right upper lobe, and again confirmed that there was no evidence of any developing endobronchial process or extrinsic compression, utilizing     Various LN stations were scanned with the aid of US:  Station 11L was 5mm in size and it was biopsied.   Station 10L was 7mm in size and it was  biopsied.   Station 7 was 8mm in size and it was bipsied.   Station 11R was 3mm in size, and appeared shoddy, no clear margines- I elected not to biopsy.   Station 10R was not visualized.     Samples Obtained  *Under EBUS (US Real time guidance) FNA of stations 11L, 10L, 7  were obtained and sent for cytological examination, cell block analysis. A total of 7 passes were made.      Unplanned Events:  *There were no unplanned events. The patient's vital signs remained normal throughout the procedure.  *ETT was removed in the procedure room.  *Post-procedure chest x-ray is not required.  *Patient was sent in stable condition to recovery area.    Estimated Blood Loss: Minimal.    Recommendations/Plans:  Follow up in pulmonary clinic as scheduled.    Alexander Pandey MD  Pulmonary and Critical Care Medicine  [facility]  04/10/2024  9:51 AM

## 2024-04-10 NOTE — ANESTHESIA PROCEDURE NOTES
Intubation    Date/Time: 4/10/2024 8:45 AM    Performed by: Raj Li CRNA  Authorized by: Cezar Fontenot MD    Intubation:     Induction:  Intravenous    Intubated:  Postinduction    Mask Ventilation:  Easy mask    Attempts:  1    Attempted By:  CRNA    Method of Intubation:  Video laryngoscopy    Blade:  Dillon 4    Laryngeal View Grade: Grade I - full view of cords      Difficult Airway Encountered?: No      Complications:  None    Airway Device:  Oral endotracheal tube    Airway Device Size:  9.0    Style/Cuff Inflation:  Cuffed (inflated to minimal occlusive pressure)    Inflation Amount (mL):  4    Tube secured:  23    Secured at:  The lips    Placement Verified By:  Capnometry and Fiber optic visualization    Complicating Factors:  None    Findings Post-Intubation:  BS equal bilateral and atraumatic/condition of teeth unchanged

## 2024-04-10 NOTE — ANESTHESIA POSTPROCEDURE EVALUATION
Anesthesia Post Evaluation    Patient: Mario Grier    Procedure(s) Performed: Procedure(s) (LRB):  ENDOBRONCHIAL ULTRASOUND (EBUS) (N/A)    Final Anesthesia Type: general      Patient location during evaluation: PACU  Patient participation: Yes- Able to Participate  Level of consciousness: awake and alert and oriented  Post-procedure vital signs: reviewed and stable  Pain management: adequate  Airway patency: patent    PONV status at discharge: No PONV  Anesthetic complications: no      Cardiovascular status: blood pressure returned to baseline and hemodynamically stable  Respiratory status: unassisted, spontaneous ventilation and room air  Hydration status: euvolemic  Follow-up not needed.              Vitals Value Taken Time   /68 04/10/24 1045   Temp 36.6 °C (97.8 °F) 04/10/24 1045   Pulse 51 04/10/24 1045   Resp 14 04/10/24 1045   SpO2 99 % 04/10/24 1045         Event Time   Out of Recovery 10:32:18         Pain/Tadeo Score: Tadeo Score: 10 (4/10/2024 10:30 AM)
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
## 2024-04-15 ENCOUNTER — HOSPITAL ENCOUNTER (OUTPATIENT)
Dept: CARDIOLOGY | Facility: HOSPITAL | Age: 69
Discharge: HOME | End: 2024-04-15
Attending: PHYSICIAN ASSISTANT
Payer: MEDICARE

## 2024-04-15 VITALS
WEIGHT: 125 LBS | HEIGHT: 66 IN | DIASTOLIC BLOOD PRESSURE: 88 MMHG | BODY MASS INDEX: 20.09 KG/M2 | SYSTOLIC BLOOD PRESSURE: 134 MMHG

## 2024-04-15 DIAGNOSIS — Z01.810 PRE-OPERATIVE CARDIOVASCULAR EXAMINATION: ICD-10-CM

## 2024-04-15 LAB
ASCENDING AORTA: 2.97 CM
BSA FOR ECHO PROCEDURE: 1.62 M2
CV ECHO LV RWT: 0.34 CM
CV STRESS BASE HR: 48 BPM
DIASTOLIC BLOOD PRESSURE: 78 MMHG
DOP CALC LVOT AREA: 3.5 CM2
DOP CALC LVOT DIAMETER: 2.11 CM
DOP CALC LVOT PEAK VEL: 0.87 M/S
DOP CALC LVOT STROKE VOLUME: 75.87 CM3
DOP CALCLVOT PEAK VEL VTI: 21.71 CM
ECHO LV POSTERIOR WALL: 0.8 CM (ref 0.6–1.1)
FRACTIONAL SHORTENING: 33 % (ref 28–44)
INTERVENTRICULAR SEPTUM: 0.8 CM (ref 0.6–1.1)
LA MAJOR: 4.64 CM
LA MINOR: 5.15 CM
LA WIDTH: 4.37 CM
LEFT ATRIUM SIZE: 3.52 CM
LEFT ATRIUM VOLUME INDEX MOD: 43.2 ML/M2
LEFT ATRIUM VOLUME INDEX: 38.9 ML/M2
LEFT ATRIUM VOLUME MOD: 70.87 CM3
LEFT ATRIUM VOLUME: 63.83 CM3
LEFT INTERNAL DIMENSION IN SYSTOLE: 3.13 CM (ref 2.1–4)
LEFT VENTRICLE DIASTOLIC VOLUME INDEX: 58.19 ML/M2
LEFT VENTRICLE DIASTOLIC VOLUME: 95.43 ML
LEFT VENTRICLE MASS INDEX: 75 G/M2
LEFT VENTRICLE SYSTOLIC VOLUME INDEX: 23.6 ML/M2
LEFT VENTRICLE SYSTOLIC VOLUME: 38.72 ML
LEFT VENTRICULAR INTERNAL DIMENSION IN DIASTOLE: 4.7 CM (ref 3.5–6)
LEFT VENTRICULAR MASS: 122.26 G
MV A" WAVE DURATION": 11.42 MSEC
OHS CV CPX 1 MINUTE RECOVERY HEART RATE: 133 BPM
OHS CV CPX 85 PERCENT MAX PREDICTED HEART RATE MALE: 129
OHS CV CPX MAX PREDICTED HEART RATE: 152
OHS CV CPX PATIENT IS FEMALE: 0
OHS CV CPX PATIENT IS MALE: 1
OHS CV CPX PEAK DIASTOLIC BLOOD PRESSURE: 88 MMHG
OHS CV CPX PEAK HEAR RATE: 133 BPM
OHS CV CPX PEAK RATE PRESSURE PRODUCT: NORMAL
OHS CV CPX PEAK SYSTOLIC BLOOD PRESSURE: 208 MMHG
OHS CV CPX PERCENT MAX PREDICTED HEART RATE ACHIEVED: 88
OHS CV CPX RATE PRESSURE PRODUCT PRESENTING: 6720
OHS CV INITIAL DOSE: 10 MCG/KG/MIN
OHS CV PEAK DOSE: 40 MCG/KG/MIN
PULM VEIN S/D RATIO: 2.16
PV PEAK D VEL: 0.25 M/S
PV PEAK S VEL: 0.54 M/S
RA MAJOR: 4.86 CM
RA PRESSURE ESTIMATED: 3 MMHG
RA WIDTH: 3.59 CM
RIGHT VENTRICULAR END-DIASTOLIC DIMENSION: 3.75 CM
SINUS: 3.39 CM
STJ: 3.2 CM
SYSTOLIC BLOOD PRESSURE: 140 MMHG
TDI LATERAL: 0.1 M/S
TDI SEPTAL: 0.08 M/S
TDI: 0.09 M/S
TRICUSPID ANNULAR PLANE SYSTOLIC EXCURSION: 2.57 CM
Z-SCORE OF LEFT VENTRICULAR DIMENSION IN END DIASTOLE: 0.18
Z-SCORE OF LEFT VENTRICULAR DIMENSION IN END SYSTOLE: 0.71

## 2024-04-15 PROCEDURE — 93351 STRESS TTE COMPLETE: CPT | Mod: 26,,, | Performed by: INTERNAL MEDICINE

## 2024-04-15 PROCEDURE — 93351 STRESS TTE COMPLETE: CPT

## 2024-04-15 PROCEDURE — 63600175 PHARM REV CODE 636 W HCPCS: Performed by: PHYSICIAN ASSISTANT

## 2024-04-15 RX ORDER — ATROPINE SULFATE 0.1 MG/ML
0.75 INJECTION INTRAVENOUS
Status: COMPLETED | OUTPATIENT
Start: 2024-04-15 | End: 2024-04-15

## 2024-04-15 RX ORDER — DOBUTAMINE HYDROCHLORIDE 400 MG/100ML
40 INJECTION, SOLUTION INTRAVENOUS
Status: COMPLETED | OUTPATIENT
Start: 2024-04-15 | End: 2024-04-15

## 2024-04-15 RX ADMIN — ATROPINE SULFATE 0.75 MG: 0.1 INJECTION INTRAVENOUS at 02:04

## 2024-04-15 RX ADMIN — DOBUTAMINE 40 MCG/KG/MIN: 12.5 INJECTION, SOLUTION, CONCENTRATE INTRAVENOUS at 01:04

## 2024-04-15 NOTE — NURSING
Patient verified by 2 identifiers and allergies reviewed.  22g IV placed to Rt FA.  DSE explained to patient, consent obtained & testing completed.  Pt tolerated testing well. V Meryt & Chinmay noted during stress, pt asymptomatic.  IV removed post testing.  Post study discharge instructions reviewed with patient, patient verbalized understanding.  Patient discharged to home in stable condition.

## 2024-04-18 ENCOUNTER — TELEPHONE (OUTPATIENT)
Dept: CARDIOLOGY | Facility: CLINIC | Age: 69
End: 2024-04-18
Payer: MEDICARE

## 2024-04-18 DIAGNOSIS — R94.39 ABNORMAL STRESS ECG: ICD-10-CM

## 2024-04-18 DIAGNOSIS — Z01.810 PRE-OPERATIVE CARDIOVASCULAR EXAMINATION: Primary | ICD-10-CM

## 2024-04-18 DIAGNOSIS — C34.91 ADENOCARCINOMA OF RIGHT LUNG: ICD-10-CM

## 2024-04-18 NOTE — TELEPHONE ENCOUNTER
Spoke to patient to schedule a cardiac clearance with Dr Prater tomorrow at 10:15. Pt verbally understood.

## 2024-04-18 NOTE — PRE-PROCEDURE INSTRUCTIONS
PreOp Instructions given:   - Verbal medication information (what to hold and what to take)   - NPO guidelines 2400, sips of water w/AM medications  - Arrival place directions given; time to be given the day before procedure by the   Surgeon's Office DOSC  - Bathing with antibacterial soap   - Don't wear any jewelry or bring any valuables AM of surgery   - No makeup or moisturizer to face   - No perfume/cologne, powder, lotions or aftershave   Pt. verbalized understanding.   Pt denies any h/o Anesthesia/Sedation complications or side effects.  Patient does not know arrival time.  Explained that this information comes from the surgeon's office and if they haven't heard from them by 2 or 3 pm to call the office.  Patient stated an understanding.

## 2024-04-19 ENCOUNTER — ANESTHESIA EVENT (OUTPATIENT)
Dept: SURGERY | Facility: HOSPITAL | Age: 69
DRG: 164 | End: 2024-04-19
Payer: MEDICARE

## 2024-04-19 ENCOUNTER — TELEPHONE (OUTPATIENT)
Dept: CARDIOTHORACIC SURGERY | Facility: CLINIC | Age: 69
End: 2024-04-19
Payer: MEDICARE

## 2024-04-19 ENCOUNTER — OFFICE VISIT (OUTPATIENT)
Dept: CARDIOLOGY | Facility: CLINIC | Age: 69
DRG: 164 | End: 2024-04-19
Payer: MEDICARE

## 2024-04-19 VITALS
HEIGHT: 66 IN | DIASTOLIC BLOOD PRESSURE: 81 MMHG | HEART RATE: 58 BPM | WEIGHT: 136.44 LBS | BODY MASS INDEX: 21.93 KG/M2 | SYSTOLIC BLOOD PRESSURE: 138 MMHG

## 2024-04-19 DIAGNOSIS — R94.31 ABNORMAL EKG: ICD-10-CM

## 2024-04-19 DIAGNOSIS — R09.89 RIGHT CAROTID BRUIT: ICD-10-CM

## 2024-04-19 DIAGNOSIS — I34.0 NONRHEUMATIC MITRAL VALVE REGURGITATION: Primary | ICD-10-CM

## 2024-04-19 DIAGNOSIS — Z71.6 TOBACCO ABUSE COUNSELING: Chronic | ICD-10-CM

## 2024-04-19 DIAGNOSIS — C34.91 ADENOCARCINOMA OF RIGHT LUNG: Chronic | ICD-10-CM

## 2024-04-19 DIAGNOSIS — Z01.810 PRE-OPERATIVE CARDIOVASCULAR EXAMINATION: ICD-10-CM

## 2024-04-19 DIAGNOSIS — I51.7 LAE (LEFT ATRIAL ENLARGEMENT): Chronic | ICD-10-CM

## 2024-04-19 PROCEDURE — 3075F SYST BP GE 130 - 139MM HG: CPT | Mod: CPTII,S$GLB,, | Performed by: INTERNAL MEDICINE

## 2024-04-19 PROCEDURE — 1157F ADVNC CARE PLAN IN RCRD: CPT | Mod: CPTII,S$GLB,, | Performed by: INTERNAL MEDICINE

## 2024-04-19 PROCEDURE — 99204 OFFICE O/P NEW MOD 45 MIN: CPT | Mod: S$GLB,,, | Performed by: INTERNAL MEDICINE

## 2024-04-19 PROCEDURE — 1126F AMNT PAIN NOTED NONE PRSNT: CPT | Mod: CPTII,S$GLB,, | Performed by: INTERNAL MEDICINE

## 2024-04-19 PROCEDURE — 1101F PT FALLS ASSESS-DOCD LE1/YR: CPT | Mod: CPTII,S$GLB,, | Performed by: INTERNAL MEDICINE

## 2024-04-19 PROCEDURE — 99999 PR PBB SHADOW E&M-EST. PATIENT-LVL III: CPT | Mod: PBBFAC,,, | Performed by: INTERNAL MEDICINE

## 2024-04-19 PROCEDURE — 3008F BODY MASS INDEX DOCD: CPT | Mod: CPTII,S$GLB,, | Performed by: INTERNAL MEDICINE

## 2024-04-19 PROCEDURE — 93010 ELECTROCARDIOGRAM REPORT: CPT | Mod: S$GLB,,, | Performed by: INTERNAL MEDICINE

## 2024-04-19 PROCEDURE — 93005 ELECTROCARDIOGRAM TRACING: CPT | Mod: PO

## 2024-04-19 PROCEDURE — 3079F DIAST BP 80-89 MM HG: CPT | Mod: CPTII,S$GLB,, | Performed by: INTERNAL MEDICINE

## 2024-04-19 PROCEDURE — 1159F MED LIST DOCD IN RCRD: CPT | Mod: CPTII,S$GLB,, | Performed by: INTERNAL MEDICINE

## 2024-04-19 PROCEDURE — 3288F FALL RISK ASSESSMENT DOCD: CPT | Mod: CPTII,S$GLB,, | Performed by: INTERNAL MEDICINE
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
## 2024-04-22 ENCOUNTER — HOSPITAL ENCOUNTER (INPATIENT)
Facility: HOSPITAL | Age: 69
LOS: 4 days | Discharge: HOME | DRG: 164 | End: 2024-04-26
Attending: THORACIC SURGERY (CARDIOTHORACIC VASCULAR SURGERY) | Admitting: THORACIC SURGERY (CARDIOTHORACIC VASCULAR SURGERY)
Payer: MEDICARE

## 2024-04-22 ENCOUNTER — ANESTHESIA (OUTPATIENT)
Dept: SURGERY | Facility: HOSPITAL | Age: 69
DRG: 164 | End: 2024-04-22
Payer: MEDICARE

## 2024-04-22 DIAGNOSIS — I48.91 ATRIAL FIBRILLATION: ICD-10-CM

## 2024-04-22 DIAGNOSIS — R00.0 TACHYCARDIA: ICD-10-CM

## 2024-04-22 DIAGNOSIS — C34.91 NSCLC OF RIGHT LUNG: ICD-10-CM

## 2024-04-22 DIAGNOSIS — I47.10 SVT (SUPRAVENTRICULAR TACHYCARDIA): ICD-10-CM

## 2024-04-22 DIAGNOSIS — C34.91 PRIMARY LUNG ADENOCARCINOMA, RIGHT: Primary | ICD-10-CM

## 2024-04-22 DIAGNOSIS — R94.31 ELECTROCARDIOGRAM SHOWING T WAVE ABNORMALITIES: ICD-10-CM

## 2024-04-22 LAB
ABO + RH BLD: NORMAL
BLD GP AB SCN CELLS X3 SERPL QL: NORMAL
CREAT SERPL-MCNC: 0.9 MG/DL (ref 0.5–1.4)
EST. GFR  (NO RACE VARIABLE): >60 ML/MIN/1.73 M^2
OHS QRS DURATION: 74 MS
OHS QTC CALCULATION: 407 MS
SPECIMEN OUTDATE: NORMAL

## 2024-04-22 PROCEDURE — 27201423 OPTIME MED/SURG SUP & DEVICES STERILE SUPPLY: Performed by: THORACIC SURGERY (CARDIOTHORACIC VASCULAR SURGERY)

## 2024-04-22 PROCEDURE — 27000221 HC OXYGEN, UP TO 24 HOURS

## 2024-04-22 PROCEDURE — 71000039 HC RECOVERY, EACH ADD'L HOUR: Performed by: THORACIC SURGERY (CARDIOTHORACIC VASCULAR SURGERY)

## 2024-04-22 PROCEDURE — 07B74ZX EXCISION OF THORAX LYMPHATIC, PERCUTANEOUS ENDOSCOPIC APPROACH, DIAGNOSTIC: ICD-10-PCS | Performed by: THORACIC SURGERY (CARDIOTHORACIC VASCULAR SURGERY)

## 2024-04-22 PROCEDURE — 71000016 HC POSTOP RECOV ADDL HR: Performed by: THORACIC SURGERY (CARDIOTHORACIC VASCULAR SURGERY)

## 2024-04-22 PROCEDURE — 25000242 PHARM REV CODE 250 ALT 637 W/ HCPCS

## 2024-04-22 PROCEDURE — 25000003 PHARM REV CODE 250: Performed by: THORACIC SURGERY (CARDIOTHORACIC VASCULAR SURGERY)

## 2024-04-22 PROCEDURE — 25000003 PHARM REV CODE 250

## 2024-04-22 PROCEDURE — 63600175 PHARM REV CODE 636 W HCPCS

## 2024-04-22 PROCEDURE — 32674 THORACOSCOPY LYMPH NODE EXC: CPT | Mod: AS,,,

## 2024-04-22 PROCEDURE — 93005 ELECTROCARDIOGRAM TRACING: CPT

## 2024-04-22 PROCEDURE — 86850 RBC ANTIBODY SCREEN: CPT

## 2024-04-22 PROCEDURE — D9220A PRA ANESTHESIA: Mod: ANES,,, | Performed by: ANESTHESIOLOGY

## 2024-04-22 PROCEDURE — 94640 AIRWAY INHALATION TREATMENT: CPT

## 2024-04-22 PROCEDURE — 36000 PLACE NEEDLE IN VEIN: CPT

## 2024-04-22 PROCEDURE — C1729 CATH, DRAINAGE: HCPCS | Performed by: THORACIC SURGERY (CARDIOTHORACIC VASCULAR SURGERY)

## 2024-04-22 PROCEDURE — 37000009 HC ANESTHESIA EA ADD 15 MINS: Performed by: THORACIC SURGERY (CARDIOTHORACIC VASCULAR SURGERY)

## 2024-04-22 PROCEDURE — 37000008 HC ANESTHESIA 1ST 15 MINUTES: Performed by: THORACIC SURGERY (CARDIOTHORACIC VASCULAR SURGERY)

## 2024-04-22 PROCEDURE — 63600175 PHARM REV CODE 636 W HCPCS: Mod: JZ,JG | Performed by: STUDENT IN AN ORGANIZED HEALTH CARE EDUCATION/TRAINING PROGRAM

## 2024-04-22 PROCEDURE — 71000015 HC POSTOP RECOV 1ST HR: Performed by: THORACIC SURGERY (CARDIOTHORACIC VASCULAR SURGERY)

## 2024-04-22 PROCEDURE — 0BTC4ZZ RESECTION OF RIGHT UPPER LUNG LOBE, PERCUTANEOUS ENDOSCOPIC APPROACH: ICD-10-PCS | Performed by: THORACIC SURGERY (CARDIOTHORACIC VASCULAR SURGERY)

## 2024-04-22 PROCEDURE — 93010 ELECTROCARDIOGRAM REPORT: CPT | Mod: ,,, | Performed by: INTERNAL MEDICINE

## 2024-04-22 PROCEDURE — 36000712 HC OR TIME LEV V 1ST 15 MIN: Performed by: THORACIC SURGERY (CARDIOTHORACIC VASCULAR SURGERY)

## 2024-04-22 PROCEDURE — 83735 ASSAY OF MAGNESIUM: CPT

## 2024-04-22 PROCEDURE — 25000003 PHARM REV CODE 250: Performed by: NURSE ANESTHETIST, CERTIFIED REGISTERED

## 2024-04-22 PROCEDURE — 20600001 HC STEP DOWN PRIVATE ROOM

## 2024-04-22 PROCEDURE — 8E0W4CZ ROBOTIC ASSISTED PROCEDURE OF TRUNK REGION, PERCUTANEOUS ENDOSCOPIC APPROACH: ICD-10-PCS | Performed by: THORACIC SURGERY (CARDIOTHORACIC VASCULAR SURGERY)

## 2024-04-22 PROCEDURE — 32663 THORACOSCOPY W/LOBECTOMY: CPT | Mod: RT,,, | Performed by: THORACIC SURGERY (CARDIOTHORACIC VASCULAR SURGERY)

## 2024-04-22 PROCEDURE — 85025 COMPLETE CBC W/AUTO DIFF WBC: CPT

## 2024-04-22 PROCEDURE — 82565 ASSAY OF CREATININE: CPT

## 2024-04-22 PROCEDURE — 36000713 HC OR TIME LEV V EA ADD 15 MIN: Performed by: THORACIC SURGERY (CARDIOTHORACIC VASCULAR SURGERY)

## 2024-04-22 PROCEDURE — 99900035 HC TECH TIME PER 15 MIN (STAT)

## 2024-04-22 PROCEDURE — 32674 THORACOSCOPY LYMPH NODE EXC: CPT | Mod: ,,, | Performed by: THORACIC SURGERY (CARDIOTHORACIC VASCULAR SURGERY)

## 2024-04-22 PROCEDURE — C9290 INJ, BUPIVACAINE LIPOSOME: HCPCS | Performed by: THORACIC SURGERY (CARDIOTHORACIC VASCULAR SURGERY)

## 2024-04-22 PROCEDURE — 80048 BASIC METABOLIC PNL TOTAL CA: CPT

## 2024-04-22 PROCEDURE — 71000033 HC RECOVERY, INTIAL HOUR: Performed by: THORACIC SURGERY (CARDIOTHORACIC VASCULAR SURGERY)

## 2024-04-22 PROCEDURE — 36620 INSERTION CATHETER ARTERY: CPT | Mod: 59,,, | Performed by: ANESTHESIOLOGY

## 2024-04-22 PROCEDURE — 94761 N-INVAS EAR/PLS OXIMETRY MLT: CPT

## 2024-04-22 PROCEDURE — D9220A PRA ANESTHESIA: Mod: CRNA,,, | Performed by: NURSE ANESTHETIST, CERTIFIED REGISTERED

## 2024-04-22 PROCEDURE — 63600175 PHARM REV CODE 636 W HCPCS: Performed by: THORACIC SURGERY (CARDIOTHORACIC VASCULAR SURGERY)

## 2024-04-22 PROCEDURE — 32663 THORACOSCOPY W/LOBECTOMY: CPT | Mod: AS,RT,,

## 2024-04-22 RX ORDER — OXYCODONE HYDROCHLORIDE 10 MG/1
10 TABLET ORAL EVERY 4 HOURS PRN
Status: DISCONTINUED | OUTPATIENT
Start: 2024-04-22 | End: 2024-04-23

## 2024-04-22 RX ORDER — ONDANSETRON HYDROCHLORIDE 2 MG/ML
INJECTION, SOLUTION INTRAVENOUS
Status: DISCONTINUED | OUTPATIENT
Start: 2024-04-22 | End: 2024-04-22

## 2024-04-22 RX ORDER — HYDRALAZINE HYDROCHLORIDE 20 MG/ML
10 INJECTION INTRAMUSCULAR; INTRAVENOUS EVERY 6 HOURS PRN
Status: DISCONTINUED | OUTPATIENT
Start: 2024-04-23 | End: 2024-04-23

## 2024-04-22 RX ORDER — ACETAMINOPHEN 325 MG/1
650 TABLET ORAL ONCE
Status: COMPLETED | OUTPATIENT
Start: 2024-04-22 | End: 2024-04-22

## 2024-04-22 RX ORDER — FENTANYL CITRATE 50 UG/ML
INJECTION, SOLUTION INTRAMUSCULAR; INTRAVENOUS
Status: DISCONTINUED | OUTPATIENT
Start: 2024-04-22 | End: 2024-04-22

## 2024-04-22 RX ORDER — DEXMEDETOMIDINE HYDROCHLORIDE 100 UG/ML
INJECTION, SOLUTION INTRAVENOUS
Status: DISCONTINUED | OUTPATIENT
Start: 2024-04-22 | End: 2024-04-22

## 2024-04-22 RX ORDER — BISACODYL 10 MG/1
10 SUPPOSITORY RECTAL DAILY PRN
Status: DISCONTINUED | OUTPATIENT
Start: 2024-04-22 | End: 2024-04-26 | Stop reason: HOSPADM

## 2024-04-22 RX ORDER — METHOCARBAMOL 500 MG/1
500 TABLET, FILM COATED ORAL 4 TIMES DAILY
Status: DISCONTINUED | OUTPATIENT
Start: 2024-04-22 | End: 2024-04-23

## 2024-04-22 RX ORDER — TAMSULOSIN HYDROCHLORIDE 0.4 MG/1
0.4 CAPSULE ORAL NIGHTLY
Status: DISCONTINUED | OUTPATIENT
Start: 2024-04-22 | End: 2024-04-26 | Stop reason: HOSPADM

## 2024-04-22 RX ORDER — LABETALOL HCL 20 MG/4 ML
10 SYRINGE (ML) INTRAVENOUS ONCE
Status: COMPLETED | OUTPATIENT
Start: 2024-04-22 | End: 2024-04-22

## 2024-04-22 RX ORDER — KETAMINE HCL IN 0.9 % NACL 50 MG/5 ML
SYRINGE (ML) INTRAVENOUS
Status: DISCONTINUED | OUTPATIENT
Start: 2024-04-22 | End: 2024-04-22

## 2024-04-22 RX ORDER — HYDROMORPHONE HCL IN 0.9% NACL 6 MG/30 ML
PATIENT CONTROLLED ANALGESIA SYRINGE INTRAVENOUS CONTINUOUS
Status: DISCONTINUED | OUTPATIENT
Start: 2024-04-22 | End: 2024-04-23

## 2024-04-22 RX ORDER — METOCLOPRAMIDE HYDROCHLORIDE 5 MG/ML
5 INJECTION INTRAMUSCULAR; INTRAVENOUS EVERY 6 HOURS PRN
Status: DISCONTINUED | OUTPATIENT
Start: 2024-04-22 | End: 2024-04-26 | Stop reason: HOSPADM

## 2024-04-22 RX ORDER — LABETALOL HCL 20 MG/4 ML
20 SYRINGE (ML) INTRAVENOUS ONCE
Status: COMPLETED | OUTPATIENT
Start: 2024-04-22 | End: 2024-04-22

## 2024-04-22 RX ORDER — LABETALOL HCL 20 MG/4 ML
10 SYRINGE (ML) INTRAVENOUS
Status: COMPLETED | OUTPATIENT
Start: 2024-04-22 | End: 2024-04-22

## 2024-04-22 RX ORDER — FAMOTIDINE 20 MG/1
20 TABLET, FILM COATED ORAL 2 TIMES DAILY
Status: DISCONTINUED | OUTPATIENT
Start: 2024-04-22 | End: 2024-04-26 | Stop reason: HOSPADM

## 2024-04-22 RX ORDER — POLYETHYLENE GLYCOL 3350 17 G/17G
17 POWDER, FOR SOLUTION ORAL DAILY
Status: DISCONTINUED | OUTPATIENT
Start: 2024-04-22 | End: 2024-04-26 | Stop reason: HOSPADM

## 2024-04-22 RX ORDER — LABETALOL HCL 20 MG/4 ML
SYRINGE (ML) INTRAVENOUS
Status: COMPLETED
Start: 2024-04-22 | End: 2024-04-22

## 2024-04-22 RX ORDER — FENTANYL CITRATE 50 UG/ML
INJECTION, SOLUTION INTRAMUSCULAR; INTRAVENOUS
Status: COMPLETED
Start: 2024-04-22 | End: 2024-04-22

## 2024-04-22 RX ORDER — TACROLIMUS 1 MG/1
1 CAPSULE ORAL EVERY MORNING
Status: DISCONTINUED | OUTPATIENT
Start: 2024-04-23 | End: 2024-04-23

## 2024-04-22 RX ORDER — ACETAMINOPHEN 500 MG
1000 TABLET ORAL EVERY 8 HOURS
Status: DISCONTINUED | OUTPATIENT
Start: 2024-04-22 | End: 2024-04-26 | Stop reason: HOSPADM

## 2024-04-22 RX ORDER — HYDRALAZINE HYDROCHLORIDE 20 MG/ML
5 INJECTION INTRAMUSCULAR; INTRAVENOUS ONCE
Status: COMPLETED | OUTPATIENT
Start: 2024-04-22 | End: 2024-04-22

## 2024-04-22 RX ORDER — HYDROMORPHONE HYDROCHLORIDE 1 MG/ML
INJECTION, SOLUTION INTRAMUSCULAR; INTRAVENOUS; SUBCUTANEOUS
Status: COMPLETED
Start: 2024-04-22 | End: 2024-04-22

## 2024-04-22 RX ORDER — GABAPENTIN 300 MG/1
300 CAPSULE ORAL NIGHTLY
Status: DISCONTINUED | OUTPATIENT
Start: 2024-04-22 | End: 2024-04-23

## 2024-04-22 RX ORDER — PROPOFOL 10 MG/ML
VIAL (ML) INTRAVENOUS
Status: DISCONTINUED | OUTPATIENT
Start: 2024-04-22 | End: 2024-04-22

## 2024-04-22 RX ORDER — LIDOCAINE HYDROCHLORIDE 20 MG/ML
INJECTION INTRAVENOUS
Status: DISCONTINUED | OUTPATIENT
Start: 2024-04-22 | End: 2024-04-22

## 2024-04-22 RX ORDER — LABETALOL HCL 20 MG/4 ML
10 SYRINGE (ML) INTRAVENOUS EVERY 6 HOURS PRN
Status: DISCONTINUED | OUTPATIENT
Start: 2024-04-23 | End: 2024-04-23

## 2024-04-22 RX ORDER — HYDROMORPHONE HYDROCHLORIDE 1 MG/ML
0.2 INJECTION, SOLUTION INTRAMUSCULAR; INTRAVENOUS; SUBCUTANEOUS EVERY 5 MIN PRN
Status: DISCONTINUED | OUTPATIENT
Start: 2024-04-22 | End: 2024-04-23

## 2024-04-22 RX ORDER — CEFAZOLIN SODIUM 1 G/3ML
INJECTION, POWDER, FOR SOLUTION INTRAMUSCULAR; INTRAVENOUS
Status: DISCONTINUED | OUTPATIENT
Start: 2024-04-22 | End: 2024-04-22

## 2024-04-22 RX ORDER — NALOXONE HCL 0.4 MG/ML
0.02 VIAL (ML) INJECTION
Status: DISCONTINUED | OUTPATIENT
Start: 2024-04-23 | End: 2024-04-23

## 2024-04-22 RX ORDER — OXYCODONE HYDROCHLORIDE 5 MG/1
5 TABLET ORAL EVERY 4 HOURS PRN
Status: DISCONTINUED | OUTPATIENT
Start: 2024-04-22 | End: 2024-04-23

## 2024-04-22 RX ORDER — DEXAMETHASONE SODIUM PHOSPHATE 4 MG/ML
INJECTION, SOLUTION INTRA-ARTICULAR; INTRALESIONAL; INTRAMUSCULAR; INTRAVENOUS; SOFT TISSUE
Status: DISCONTINUED | OUTPATIENT
Start: 2024-04-22 | End: 2024-04-22

## 2024-04-22 RX ORDER — ROCURONIUM BROMIDE 10 MG/ML
INJECTION, SOLUTION INTRAVENOUS
Status: DISCONTINUED | OUTPATIENT
Start: 2024-04-22 | End: 2024-04-22

## 2024-04-22 RX ORDER — HALOPERIDOL 5 MG/ML
0.5 INJECTION INTRAMUSCULAR EVERY 10 MIN PRN
Status: COMPLETED | OUTPATIENT
Start: 2024-04-22 | End: 2024-04-23

## 2024-04-22 RX ORDER — ONDANSETRON 8 MG/1
8 TABLET, ORALLY DISINTEGRATING ORAL EVERY 8 HOURS PRN
Status: DISCONTINUED | OUTPATIENT
Start: 2024-04-22 | End: 2024-04-26 | Stop reason: HOSPADM

## 2024-04-22 RX ORDER — ENOXAPARIN SODIUM 100 MG/ML
40 INJECTION SUBCUTANEOUS EVERY 24 HOURS
Status: DISCONTINUED | OUTPATIENT
Start: 2024-04-23 | End: 2024-04-26 | Stop reason: HOSPADM

## 2024-04-22 RX ORDER — IPRATROPIUM BROMIDE AND ALBUTEROL SULFATE 2.5; .5 MG/3ML; MG/3ML
3 SOLUTION RESPIRATORY (INHALATION)
Status: DISCONTINUED | OUTPATIENT
Start: 2024-04-22 | End: 2024-04-25

## 2024-04-22 RX ORDER — FENTANYL CITRATE 50 UG/ML
25 INJECTION, SOLUTION INTRAMUSCULAR; INTRAVENOUS EVERY 5 MIN PRN
Status: COMPLETED | OUTPATIENT
Start: 2024-04-22 | End: 2024-04-22

## 2024-04-22 RX ORDER — DEXMEDETOMIDINE HYDROCHLORIDE 100 UG/ML
INJECTION, SOLUTION INTRAVENOUS
Status: DISPENSED
Start: 2024-04-22 | End: 2024-04-23

## 2024-04-22 RX ORDER — AMOXICILLIN 250 MG
1 CAPSULE ORAL DAILY
Status: DISCONTINUED | OUTPATIENT
Start: 2024-04-22 | End: 2024-04-26 | Stop reason: HOSPADM

## 2024-04-22 RX ORDER — SODIUM CHLORIDE 0.9 % (FLUSH) 0.9 %
10 SYRINGE (ML) INJECTION
Status: DISCONTINUED | OUTPATIENT
Start: 2024-04-22 | End: 2024-04-23 | Stop reason: HOSPADM

## 2024-04-22 RX ORDER — BUPIVACAINE HYDROCHLORIDE 2.5 MG/ML
INJECTION, SOLUTION EPIDURAL; INFILTRATION; INTRACAUDAL
Status: DISCONTINUED | OUTPATIENT
Start: 2024-04-22 | End: 2024-04-22 | Stop reason: HOSPADM

## 2024-04-22 RX ORDER — LIDOCAINE HYDROCHLORIDE 10 MG/ML
1 INJECTION, SOLUTION EPIDURAL; INFILTRATION; INTRACAUDAL; PERINEURAL ONCE
Status: DISCONTINUED | OUTPATIENT
Start: 2024-04-22 | End: 2024-04-23 | Stop reason: HOSPADM

## 2024-04-22 RX ADMIN — POLYETHYLENE GLYCOL 3350 17 G: 17 POWDER, FOR SOLUTION ORAL at 06:04

## 2024-04-22 RX ADMIN — OXYCODONE HYDROCHLORIDE 10 MG: 10 TABLET ORAL at 05:04

## 2024-04-22 RX ADMIN — LABETALOL HYDROCHLORIDE 10 MG: 5 INJECTION, SOLUTION INTRAVENOUS at 04:04

## 2024-04-22 RX ADMIN — HYDROMORPHONE HYDROCHLORIDE 0.2 MG: 1 INJECTION, SOLUTION INTRAMUSCULAR; INTRAVENOUS; SUBCUTANEOUS at 11:04

## 2024-04-22 RX ADMIN — LABETALOL HYDROCHLORIDE 20 MG: 5 INJECTION, SOLUTION INTRAVENOUS at 06:04

## 2024-04-22 RX ADMIN — TAMSULOSIN HYDROCHLORIDE 0.4 MG: 0.4 CAPSULE ORAL at 09:04

## 2024-04-22 RX ADMIN — HYDROMORPHONE HYDROCHLORIDE 0.2 MG: 1 INJECTION, SOLUTION INTRAMUSCULAR; INTRAVENOUS; SUBCUTANEOUS at 10:04

## 2024-04-22 RX ADMIN — SODIUM CHLORIDE: 9 INJECTION, SOLUTION INTRAVENOUS at 11:04

## 2024-04-22 RX ADMIN — LIDOCAINE HYDROCHLORIDE 60 MG: 20 INJECTION INTRAVENOUS at 11:04

## 2024-04-22 RX ADMIN — HYDRALAZINE HYDROCHLORIDE 5 MG: 20 INJECTION, SOLUTION INTRAMUSCULAR; INTRAVENOUS at 09:04

## 2024-04-22 RX ADMIN — Medication: at 11:04

## 2024-04-22 RX ADMIN — CEFAZOLIN 2 G: 330 INJECTION, POWDER, FOR SOLUTION INTRAMUSCULAR; INTRAVENOUS at 11:04

## 2024-04-22 RX ADMIN — FENTANYL CITRATE 50 MCG: 50 INJECTION, SOLUTION INTRAMUSCULAR; INTRAVENOUS at 02:04

## 2024-04-22 RX ADMIN — DOCUSATE SODIUM AND SENNOSIDES 1 TABLET: 8.6; 5 TABLET, FILM COATED ORAL at 05:04

## 2024-04-22 RX ADMIN — LABETALOL HYDROCHLORIDE 10 MG: 5 INJECTION, SOLUTION INTRAVENOUS at 11:04

## 2024-04-22 RX ADMIN — HYDRALAZINE HYDROCHLORIDE 5 MG: 20 INJECTION, SOLUTION INTRAMUSCULAR; INTRAVENOUS at 08:04

## 2024-04-22 RX ADMIN — FENTANYL CITRATE 25 MCG: 50 INJECTION INTRAMUSCULAR; INTRAVENOUS at 06:04

## 2024-04-22 RX ADMIN — ACETAMINOPHEN 650 MG: 325 TABLET ORAL at 07:04

## 2024-04-22 RX ADMIN — METHOCARBAMOL 500 MG: 500 TABLET ORAL at 09:04

## 2024-04-22 RX ADMIN — LABETALOL HYDROCHLORIDE 10 MG: 5 INJECTION, SOLUTION INTRAVENOUS at 09:04

## 2024-04-22 RX ADMIN — SUGAMMADEX 200 MG: 100 INJECTION, SOLUTION INTRAVENOUS at 03:04

## 2024-04-22 RX ADMIN — Medication 10 MG: at 12:04

## 2024-04-22 RX ADMIN — Medication 20 MG: at 01:04

## 2024-04-22 RX ADMIN — FENTANYL CITRATE 25 MCG: 50 INJECTION INTRAMUSCULAR; INTRAVENOUS at 03:04

## 2024-04-22 RX ADMIN — HYDRALAZINE HYDROCHLORIDE 5 MG: 20 INJECTION, SOLUTION INTRAMUSCULAR; INTRAVENOUS at 07:04

## 2024-04-22 RX ADMIN — FENTANYL CITRATE 50 MCG: 50 INJECTION, SOLUTION INTRAMUSCULAR; INTRAVENOUS at 11:04

## 2024-04-22 RX ADMIN — GABAPENTIN 300 MG: 300 CAPSULE ORAL at 09:04

## 2024-04-22 RX ADMIN — FENTANYL CITRATE 25 MCG: 50 INJECTION INTRAMUSCULAR; INTRAVENOUS at 04:04

## 2024-04-22 RX ADMIN — DEXMEDETOMIDINE 12 MCG: 100 INJECTION, SOLUTION, CONCENTRATE INTRAVENOUS at 02:04

## 2024-04-22 RX ADMIN — ROCURONIUM BROMIDE 60 MG: 10 INJECTION, SOLUTION INTRAVENOUS at 11:04

## 2024-04-22 RX ADMIN — FAMOTIDINE 20 MG: 20 TABLET ORAL at 09:04

## 2024-04-22 RX ADMIN — Medication 20 MG: at 11:04

## 2024-04-22 RX ADMIN — FENTANYL CITRATE 100 MCG: 50 INJECTION, SOLUTION INTRAMUSCULAR; INTRAVENOUS at 11:04

## 2024-04-22 RX ADMIN — ROCURONIUM BROMIDE 20 MG: 10 INJECTION, SOLUTION INTRAVENOUS at 01:04

## 2024-04-22 RX ADMIN — DEXAMETHASONE SODIUM PHOSPHATE 4 MG: 4 INJECTION, SOLUTION INTRAMUSCULAR; INTRAVENOUS at 01:04

## 2024-04-22 RX ADMIN — GABAPENTIN 400 MG: 300 CAPSULE ORAL at 09:04

## 2024-04-22 RX ADMIN — Medication 10 MG: at 04:04

## 2024-04-22 RX ADMIN — Medication 20 MG: at 06:04

## 2024-04-22 RX ADMIN — ROCURONIUM BROMIDE 20 MG: 10 INJECTION, SOLUTION INTRAVENOUS at 02:04

## 2024-04-22 RX ADMIN — FENTANYL CITRATE 25 MCG: 50 INJECTION INTRAMUSCULAR; INTRAVENOUS at 07:04

## 2024-04-22 RX ADMIN — IPRATROPIUM BROMIDE AND ALBUTEROL SULFATE 3 ML: 2.5; .5 SOLUTION RESPIRATORY (INHALATION) at 07:04

## 2024-04-22 RX ADMIN — ONDANSETRON 4 MG: 2 INJECTION INTRAMUSCULAR; INTRAVENOUS at 01:04

## 2024-04-22 RX ADMIN — HALOPERIDOL LACTATE 0.5 MG: 5 INJECTION, SOLUTION INTRAMUSCULAR at 04:04

## 2024-04-22 RX ADMIN — SODIUM CHLORIDE, SODIUM GLUCONATE, SODIUM ACETATE, POTASSIUM CHLORIDE, MAGNESIUM CHLORIDE, SODIUM PHOSPHATE, DIBASIC, AND POTASSIUM PHOSPHATE: .53; .5; .37; .037; .03; .012; .00082 INJECTION, SOLUTION INTRAVENOUS at 11:04

## 2024-04-22 RX ADMIN — LABETALOL HYDROCHLORIDE 10 MG: 5 INJECTION, SOLUTION INTRAVENOUS at 05:04

## 2024-04-22 RX ADMIN — METHOCARBAMOL 500 MG: 500 TABLET ORAL at 05:04

## 2024-04-22 RX ADMIN — CEFAZOLIN 2 G: 2 INJECTION, POWDER, FOR SOLUTION INTRAMUSCULAR; INTRAVENOUS at 10:04

## 2024-04-22 RX ADMIN — DEXMEDETOMIDINE 8 MCG: 100 INJECTION, SOLUTION, CONCENTRATE INTRAVENOUS at 11:04

## 2024-04-22 RX ADMIN — PROPOFOL 150 MG: 10 INJECTION, EMULSION INTRAVENOUS at 11:04

## 2024-04-22 NOTE — ANESTHESIA PROCEDURE NOTES
Peripheral IV Insertion    Diagnosis: I99.8 Other disorder of circulatory system    Patient location during procedure: holding area    Staffing  Authorizing Provider: Cezar Salazar MD  Performing Provider: Pelon Linder MD    Staffing  Performed by: Pelon Linder MD  Authorized by: Cezar Salazar MD      Preanesthetic Checklist  Completed: patient identified, IV checked, site marked, risks and benefits discussed, surgical consent, monitors and equipment checked, pre-op evaluation, timeout performed and anesthesia consent givenPeripheral IV Insertion  Skin Prep: chlorhexidine gluconate  Local Infiltration: none  Orientation: left  Location: hand  Catheter Type: peripheral IV (single lumen)  Catheter Size: 18 G  Catheter placement by Anatomical landmarks. Heme positive aspiration all ports. Insertion Attempts: 1  Assessment  Dressing: secured with tape and tegaderm  Patient: Tolerated well  Line flushed easily.              Requested Prescriptions     Pending Prescriptions Disp Refills   • Hydroxychloroquine Sulfate 200 MG Oral Tab [Pharmacy Med Name: HYDROXYCHLOROQUINE  200MG TABLET] 180 tablet 3     Sig: TAKE 2 TABLETS BY MOUTH  EVERY DAY   • FOLIC ACID 1 MG Oral Tab [Pharm Carbon Dioxide, Total      21.0 - 32.0 mmol/L 28.0    ANION GAP      0 - 18 mmol/L 5    BUN      7 - 18 mg/dL 18    CREATININE      0.55 - 1.02 mg/dL 1.06 (H) 1.52 (H)   BUN/CREAT Ratio      10.0 - 20.0 17.0    CALCIUM      8.5 - 10.1 mg/dL 9.4    CALCUL
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
## 2024-04-22 NOTE — BRIEF OP NOTE
[facility] - Surgery ([facility])  Brief Operative Note    SUMMARY     Surgery Date: 4/22/2024     Surgeons and Role:     * Aki Hernadez MD - Primary     * Page Chan MD - Resident - Assisting        Pre-op Diagnosis:  Adenocarcinoma of right lung [C34.91]    Post-op Diagnosis:  Post-Op Diagnosis Codes:     * Adenocarcinoma of right lung [C34.91]    Procedure(s) (LRB):  XI ROBOTIC RIGHT UPPER  LOBECTOMY,LUNG (Right)  LYMPHADENECTOMY (Right)  BLOCK, NERVE, INTERCOSTAL, 2 OR MORE (Right)    Anesthesia: General    Implants:  * No implants in log *    Operative Findings: robotic right upper lobectomy with MLND    Estimated Blood Loss: 20cc    Estimated Blood Loss has been documented.         Specimens:   Specimen (24h ago, onward)       Start     Ordered    04/22/24 1456  Specimen to Pathology, Surgery Pulmonary and Thoracic  Once        Comments: Pre-op Diagnosis: Adenocarcinoma of right lung [C34.91]Procedure(s):XI ROBOTIC RIGHT UPPER  LOBECTOMY,LUNGLYMPHADENECTOMYBLOCK, NERVE, INTERCOSTAL, 2 OR MORE Number of specimens: 8Name of specimens: 1. Right level 8- perm   2. Level 7 jase packet, mulitple lymph nodes present- perm3. Level 2 jase packet, multiple lymph nodes present- perm4. Level 4 jase packet, multiple lymph nodes present- perm5. Right level 11 lymph node near interlobar pulmonary artery- perm6. Posterior level 11 sump node- perm7. Right level 12 near interlobar bronchus- perm8. Right upper lobe- perm     References:    Click here for ordering Quick Tip   Question Answer Comment   Procedure Type: Pulmonary and Thoracic    Specimen Class: Routine/Screening    Release to patient Immediate        04/22/24 1511                    [id number]

## 2024-04-22 NOTE — TRANSFER OF CARE
"Anesthesia Transfer of Care Note    Patient: Mario Grier    Procedure(s) Performed: Procedure(s) (LRB):  XI ROBOTIC RIGHT UPPER  LOBECTOMY,LUNG (Right)  LYMPHADENECTOMY (Right)  BLOCK, NERVE, INTERCOSTAL, 2 OR MORE (Right)    Patient location: PACU    Anesthesia Type: general    Transport from OR: Transported from OR on 6-10 L/min O2 by face mask with adequate spontaneous ventilation    Post pain: adequate analgesia    Post assessment: no apparent anesthetic complications and tolerated procedure well    Post vital signs: stable    Level of consciousness: awake and alert    Nausea/Vomiting: no nausea/vomiting    Complications: none    Transfer of care protocol was followed      Last vitals: Visit Vitals  BP (!) 161/87   Pulse (!) 78   Temp 37.1 °C (98.8 °F) (Temporal)   Resp 19   Ht 5' 6" (1.676 m)   Wt 61.7 kg (136 lb)   SpO2 100%   BMI 21.95 kg/m²     "

## 2024-04-22 NOTE — OP NOTE
Date of Surgery: 4/22/24  Preoperative Diagnosis:  Right upper lobe adenocarcinoma  Postoperative Diagnosis: Same  Procedure:  Robotic right upper lobectomy, mediastinal lymph node dissection, intercostal nerve block 2 or more levels  Surgeon: Aki Hernadez MD  First Assistant: Page Chan MD  Bedside assistant:  Cezar Portillo PA-C  Anesthesia: GETA, 7 level intercostal Exparel/Marcaine/saline  EBL: 20 mL    Surgery in Detail:    The patient has a diagnosis of localized right upper lobe adenocarcinoma.  Resection is indicated for definitive therapy.  The patient was taken to the operating room placed supine and identified.  General anesthesia was established with double-lumen tube placement.  Tube positioning was confirmed fiberoptically.  The patient was positioned in a left lateral decubitus position, all pressure points were padded, and the right lung was isolated.  The right chest was prepped and draped and a robotic time-out was performed.  A combination of 8 and 12 mm robotic ports were placed in the 8th interspace between the anterior axillary line and the paravertebral gutter.  A 12 mm assistant port was placed in the 10th interspace posterior axillary line.  Carbon dioxide was insufflated and a 7 level intercostal nerve block was performed.  The robot was docked.  The inferior pulmonary ligament was divided.  Of note, the inferior vena cava was markedly dilated.  The posterior hilar pleura was opened and a level 8 node harvested.  Subcarinal space was explored and a large level 7 jase packet harvested.  A posterior level 11 sump node was harvested at the bifurcation of the right upper lobe bronchus and bronchus intermedius.  The suprahilar and paratracheal pleura were opened.  A level 2 and 4 jase packet was harvested.  The interlobar pulmonary artery was exposed and a level 11 node harvested.  The posterior margin of the oblique fissure was completed using several fires of buttressed robotic green  loads.  The posterior ascending arterial branch was mobilized and divided with a robotic vascular load.  The truncus anterior was then mobilized and divided with a robotic vascular load.  The superior pulmonary vein was mobilized and divided with a robotic vascular load after identifying and preserving the middle lobe branch.  A level 12 node was harvested near the right upper lobe bronchus.  The bronchus was then completely mobilized and divided with a robotic green load.  The horizontal fissure was completed using multiple fires of buttressed robotic green loads.  The lobectomy specimen was placed into a large endobag and extracted through an enlarged anterior robotic port.  Hemostatic gauze and Cathie powder were placed along the mediastinal and hilar dissection bed.  A 24 Nauruan straight chest tube and a 24 Naur[place] Gómez drain were inserted and exteriorized separately.  They were secured with silk suture.  All remaining ports and instruments were removed and all port sites examined and were hemostatic.  Right lung ventilation was restored with complete lung expansion.  There was no evidence of right middle lobe torsion.  All port sites were closed in 2 layers with absorbable suture.  A sterile dressing was applied.  The patient was extubated and transported to the PACU stable.    Bedside assistant attestation: Cezar Portillo functioned as a bedside 1st assistant.  His duties included robotic docking, instrument exchange, and specimen retrieval throughout the entire surgery.  There was no qualified resident available to function as a bedside first assistant given the case complexity and extent of duties described above.     Attending attestation:  I was present for and either directly assisted with or performed the critical and key portions of the procedure.    Thoracic (Pulmonary) Cancer - Synoptic Operative Report Summary    Side of surgery Right   Surgical approach Robotic   Tumor type NSCLC - Adenocarcinoma    Which lymph nodes were submitted as separate specimens? 2R - Upper Paratracheal (right), 4R - Lower Paratracheal (right), 7 - Subcarinal, 8R - Paraesophageal (right), 11R - Interlobar (right), and 12R - Lobar (right)   What pre-operative therapies did the patient receive? None

## 2024-04-22 NOTE — ANESTHESIA PROCEDURE NOTES
Arterial    Diagnosis: Lobectomy    Patient location during procedure: done in OR    Staffing  Authorizing Provider: Cezar Salazar MD  Performing Provider: Pelon Linder MD    Staffing  Performed by: Pelon Linder MD  Authorized by: Cezar Salazar MD    Anesthesiologist was present at the time of the procedure.    Preanesthetic Checklist  Completed: patient identified, IV checked, site marked, risks and benefits discussed, surgical consent, monitors and equipment checked, pre-op evaluation, timeout performed and anesthesia consent givenArterial  Skin Prep: chlorhexidine gluconate  Local Infiltration: none  Orientation: right  Location: radial    Catheter Size: 20 G  Catheter placement by Anatomical landmarks. Heme positive aspiration all ports. Insertion Attempts: 1  Assessment  Dressing: secured with tape and tegaderm  Patient: Tolerated well

## 2024-04-22 NOTE — ANESTHESIA PROCEDURE NOTES
Intubation    Date/Time: 4/22/2024 11:18 AM    Performed by: Pelon Linder MD  Authorized by: Cezar Salazar MD    Intubation:     Induction:  Intravenous    Intubated:  Postinduction    Mask Ventilation:  Easy with oral airway    Attempts:  1    Attempted By:  Resident anesthesiologist    Method of Intubation:  Video laryngoscopy    Blade:  Dillon 3    Laryngeal View Grade: Grade I - full view of cords      Difficult Airway Encountered?: No      Complications:  None    Airway Device:  Double lumen tube left    Airway Device Size:  37F    Style/Cuff Inflation:  Cuffed (inflated to minimal occlusive pressure)    Inflation Amount (mL):  15    Tube secured:  29    Secured at:  The teeth    Placement Verified By:  Capnometry and Fiber optic visualization    Complicating Factors:  None    Findings Post-Intubation:  BS equal bilateral and atraumatic/condition of teeth unchanged

## 2024-04-22 NOTE — BRIEF OP NOTE
Certification of Assistant at Surgery       Surgery Date: 4/22/2024     Participating Surgeons:  Surgeons and Role:     * Aki Hernadez MD - Primary     * Page Chan MD - Resident - Assisting     * Cezar Portillo PA-C - Assisting    Procedures:  Procedure(s) (LRB):  XI ROBOTIC RIGHT UPPER  LOBECTOMY,LUNG (Right)  LYMPHADENECTOMY (Right)  BLOCK, NERVE, INTERCOSTAL, 2 OR MORE (Right)    Assistant Surgeon's Certification of Necessity:  I understand that section 1842 (b) (6) (d) of the Social Security Act generally prohibits Medicare Part B reasonable charge payment for the services of assistants at surgery in teaching hospitals when qualified residents are available to furnish such services. I certify that the services for which payment is claimed were medically necessary, and that no qualified resident was available to perform the services. I further understand that these services are subject to post-payment review by the Medicare carrier.      Cezar Portillo PA-C    04/22/2024  4:29 PM

## 2024-04-23 LAB
ALBUMIN SERPL BCP-MCNC: 3.7 G/DL (ref 3.5–5.2)
ALP SERPL-CCNC: 120 U/L (ref 55–135)
ALT SERPL W/O P-5'-P-CCNC: 78 U/L (ref 10–44)
ANION GAP SERPL CALC-SCNC: 11 MMOL/L (ref 8–16)
ANION GAP SERPL CALC-SCNC: 14 MMOL/L (ref 8–16)
AST SERPL-CCNC: 83 U/L (ref 10–40)
BASOPHILS # BLD AUTO: 0.01 K/UL (ref 0–0.2)
BASOPHILS NFR BLD: 0.1 % (ref 0–1.9)
BILIRUB SERPL-MCNC: 0.7 MG/DL (ref 0.1–1)
BUN SERPL-MCNC: 16 MG/DL (ref 8–23)
BUN SERPL-MCNC: 16 MG/DL (ref 8–23)
CALCIUM SERPL-MCNC: 9.2 MG/DL (ref 8.7–10.5)
CALCIUM SERPL-MCNC: 9.3 MG/DL (ref 8.7–10.5)
CHLORIDE SERPL-SCNC: 102 MMOL/L (ref 95–110)
CHLORIDE SERPL-SCNC: 103 MMOL/L (ref 95–110)
CO2 SERPL-SCNC: 19 MMOL/L (ref 23–29)
CO2 SERPL-SCNC: 21 MMOL/L (ref 23–29)
CREAT SERPL-MCNC: 1 MG/DL (ref 0.5–1.4)
CREAT SERPL-MCNC: 1.1 MG/DL (ref 0.5–1.4)
DIFFERENTIAL METHOD BLD: ABNORMAL
EOSINOPHIL # BLD AUTO: 0 K/UL (ref 0–0.5)
EOSINOPHIL NFR BLD: 0 % (ref 0–8)
ERYTHROCYTE [DISTWIDTH] IN BLOOD BY AUTOMATED COUNT: 12.6 % (ref 11.5–14.5)
EST. GFR  (NO RACE VARIABLE): >60 ML/MIN/1.73 M^2
EST. GFR  (NO RACE VARIABLE): >60 ML/MIN/1.73 M^2
GLUCOSE SERPL-MCNC: 149 MG/DL (ref 70–110)
GLUCOSE SERPL-MCNC: 174 MG/DL (ref 70–110)
HCT VFR BLD AUTO: 41.4 % (ref 40–54)
HGB BLD-MCNC: 14 G/DL (ref 14–18)
IMM GRANULOCYTES # BLD AUTO: 0.08 K/UL (ref 0–0.04)
IMM GRANULOCYTES NFR BLD AUTO: 0.6 % (ref 0–0.5)
LYMPHOCYTES # BLD AUTO: 0.4 K/UL (ref 1–4.8)
LYMPHOCYTES NFR BLD: 3 % (ref 18–48)
MAGNESIUM SERPL-MCNC: 1.6 MG/DL (ref 1.6–2.6)
MCH RBC QN AUTO: 34 PG (ref 27–31)
MCHC RBC AUTO-ENTMCNC: 33.8 G/DL (ref 32–36)
MCV RBC AUTO: 101 FL (ref 82–98)
MONOCYTES # BLD AUTO: 0.5 K/UL (ref 0.3–1)
MONOCYTES NFR BLD: 3.4 % (ref 4–15)
NEUTROPHILS # BLD AUTO: 13.3 K/UL (ref 1.8–7.7)
NEUTROPHILS NFR BLD: 92.9 % (ref 38–73)
NRBC BLD-RTO: 0 /100 WBC
OHS QRS DURATION: 90 MS
OHS QTC CALCULATION: 428 MS
PLATELET # BLD AUTO: 194 K/UL (ref 150–450)
PMV BLD AUTO: 11.7 FL (ref 9.2–12.9)
POTASSIUM SERPL-SCNC: 3.8 MMOL/L (ref 3.5–5.1)
POTASSIUM SERPL-SCNC: 4.4 MMOL/L (ref 3.5–5.1)
PROT SERPL-MCNC: 7.8 G/DL (ref 6–8.4)
RBC # BLD AUTO: 4.12 M/UL (ref 4.6–6.2)
SODIUM SERPL-SCNC: 135 MMOL/L (ref 136–145)
SODIUM SERPL-SCNC: 135 MMOL/L (ref 136–145)
TACROLIMUS BLD-MCNC: 3.7 NG/ML (ref 5–15)
TACROLIMUS BLD-MCNC: 3.8 NG/ML (ref 5–15)
WBC # BLD AUTO: 14.25 K/UL (ref 3.9–12.7)

## 2024-04-23 PROCEDURE — 20600001 HC STEP DOWN PRIVATE ROOM

## 2024-04-23 PROCEDURE — 63600175 PHARM REV CODE 636 W HCPCS

## 2024-04-23 PROCEDURE — 99222 1ST HOSP IP/OBS MODERATE 55: CPT | Mod: GC,,, | Performed by: INTERNAL MEDICINE

## 2024-04-23 PROCEDURE — 25000003 PHARM REV CODE 250: Performed by: STUDENT IN AN ORGANIZED HEALTH CARE EDUCATION/TRAINING PROGRAM

## 2024-04-23 PROCEDURE — 27000221 HC OXYGEN, UP TO 24 HOURS

## 2024-04-23 PROCEDURE — 36415 COLL VENOUS BLD VENIPUNCTURE: CPT | Performed by: STUDENT IN AN ORGANIZED HEALTH CARE EDUCATION/TRAINING PROGRAM

## 2024-04-23 PROCEDURE — 80053 COMPREHEN METABOLIC PANEL: CPT | Performed by: STUDENT IN AN ORGANIZED HEALTH CARE EDUCATION/TRAINING PROGRAM

## 2024-04-23 PROCEDURE — 94761 N-INVAS EAR/PLS OXIMETRY MLT: CPT

## 2024-04-23 PROCEDURE — 63600175 PHARM REV CODE 636 W HCPCS: Performed by: STUDENT IN AN ORGANIZED HEALTH CARE EDUCATION/TRAINING PROGRAM

## 2024-04-23 PROCEDURE — 94640 AIRWAY INHALATION TREATMENT: CPT

## 2024-04-23 PROCEDURE — 99900035 HC TECH TIME PER 15 MIN (STAT)

## 2024-04-23 PROCEDURE — 25000242 PHARM REV CODE 250 ALT 637 W/ HCPCS

## 2024-04-23 PROCEDURE — 80197 ASSAY OF TACROLIMUS: CPT | Performed by: STUDENT IN AN ORGANIZED HEALTH CARE EDUCATION/TRAINING PROGRAM

## 2024-04-23 PROCEDURE — 25000003 PHARM REV CODE 250

## 2024-04-23 RX ORDER — OXYCODONE HYDROCHLORIDE 10 MG/1
10 TABLET ORAL EVERY 4 HOURS PRN
Status: DISCONTINUED | OUTPATIENT
Start: 2024-04-23 | End: 2024-04-23

## 2024-04-23 RX ORDER — OXYCODONE HYDROCHLORIDE 10 MG/1
20 TABLET ORAL EVERY 4 HOURS PRN
Status: DISCONTINUED | OUTPATIENT
Start: 2024-04-23 | End: 2024-04-23

## 2024-04-23 RX ORDER — HYDRALAZINE HYDROCHLORIDE 20 MG/ML
10 INJECTION INTRAMUSCULAR; INTRAVENOUS EVERY 6 HOURS PRN
Status: DISCONTINUED | OUTPATIENT
Start: 2024-04-23 | End: 2024-04-26 | Stop reason: HOSPADM

## 2024-04-23 RX ORDER — TACROLIMUS 1 MG/1
1 CAPSULE ORAL 2 TIMES DAILY
Status: DISCONTINUED | OUTPATIENT
Start: 2024-04-23 | End: 2024-04-26 | Stop reason: HOSPADM

## 2024-04-23 RX ORDER — LIDOCAINE 50 MG/G
1 PATCH TOPICAL
Status: DISCONTINUED | OUTPATIENT
Start: 2024-04-23 | End: 2024-04-26 | Stop reason: HOSPADM

## 2024-04-23 RX ORDER — METHOCARBAMOL 750 MG/1
750 TABLET, FILM COATED ORAL 4 TIMES DAILY
Status: DISCONTINUED | OUTPATIENT
Start: 2024-04-23 | End: 2024-04-26 | Stop reason: HOSPADM

## 2024-04-23 RX ORDER — GABAPENTIN 300 MG/1
300 CAPSULE ORAL 3 TIMES DAILY
Status: DISCONTINUED | OUTPATIENT
Start: 2024-04-23 | End: 2024-04-26 | Stop reason: HOSPADM

## 2024-04-23 RX ORDER — OXYCODONE HYDROCHLORIDE 5 MG/1
5 TABLET ORAL EVERY 4 HOURS PRN
Status: DISCONTINUED | OUTPATIENT
Start: 2024-04-23 | End: 2024-04-23

## 2024-04-23 RX ORDER — KETOROLAC TROMETHAMINE 15 MG/ML
15 INJECTION, SOLUTION INTRAMUSCULAR; INTRAVENOUS ONCE
Status: COMPLETED | OUTPATIENT
Start: 2024-04-23 | End: 2024-04-23

## 2024-04-23 RX ORDER — LABETALOL HCL 20 MG/4 ML
10 SYRINGE (ML) INTRAVENOUS EVERY 6 HOURS PRN
Status: DISCONTINUED | OUTPATIENT
Start: 2024-04-23 | End: 2024-04-26 | Stop reason: HOSPADM

## 2024-04-23 RX ADMIN — KETOROLAC TROMETHAMINE 15 MG: 15 INJECTION, SOLUTION INTRAMUSCULAR; INTRAVENOUS at 12:04

## 2024-04-23 RX ADMIN — HYDRALAZINE HYDROCHLORIDE 10 MG: 20 INJECTION INTRAMUSCULAR; INTRAVENOUS at 05:04

## 2024-04-23 RX ADMIN — OXYCODONE HYDROCHLORIDE 15 MG: 10 TABLET ORAL at 09:04

## 2024-04-23 RX ADMIN — GABAPENTIN 300 MG: 300 CAPSULE ORAL at 09:04

## 2024-04-23 RX ADMIN — TACROLIMUS 1 MG: 1 CAPSULE ORAL at 06:04

## 2024-04-23 RX ADMIN — IPRATROPIUM BROMIDE AND ALBUTEROL SULFATE 3 ML: 2.5; .5 SOLUTION RESPIRATORY (INHALATION) at 07:04

## 2024-04-23 RX ADMIN — METHOCARBAMOL 750 MG: 750 TABLET ORAL at 05:04

## 2024-04-23 RX ADMIN — HALOPERIDOL LACTATE 0.5 MG: 5 INJECTION, SOLUTION INTRAMUSCULAR at 12:04

## 2024-04-23 RX ADMIN — POLYETHYLENE GLYCOL 3350 17 G: 17 POWDER, FOR SOLUTION ORAL at 09:04

## 2024-04-23 RX ADMIN — ACETAMINOPHEN 1000 MG: 500 TABLET ORAL at 09:04

## 2024-04-23 RX ADMIN — FAMOTIDINE 20 MG: 20 TABLET ORAL at 09:04

## 2024-04-23 RX ADMIN — OXYCODONE HYDROCHLORIDE 15 MG: 10 TABLET ORAL at 01:04

## 2024-04-23 RX ADMIN — HYDRALAZINE HYDROCHLORIDE 10 MG: 20 INJECTION, SOLUTION INTRAMUSCULAR; INTRAVENOUS at 12:04

## 2024-04-23 RX ADMIN — TAMSULOSIN HYDROCHLORIDE 0.4 MG: 0.4 CAPSULE ORAL at 09:04

## 2024-04-23 RX ADMIN — CEFAZOLIN 2 G: 2 INJECTION, POWDER, FOR SOLUTION INTRAMUSCULAR; INTRAVENOUS at 05:04

## 2024-04-23 RX ADMIN — ACETAMINOPHEN 1000 MG: 500 TABLET ORAL at 01:04

## 2024-04-23 RX ADMIN — LIDOCAINE 5% 1 PATCH: 700 PATCH TOPICAL at 12:04

## 2024-04-23 RX ADMIN — METHOCARBAMOL 750 MG: 750 TABLET ORAL at 09:04

## 2024-04-23 RX ADMIN — HYDRALAZINE HYDROCHLORIDE 10 MG: 20 INJECTION INTRAMUSCULAR; INTRAVENOUS at 12:04

## 2024-04-23 RX ADMIN — ENOXAPARIN SODIUM 40 MG: 40 INJECTION SUBCUTANEOUS at 05:04

## 2024-04-23 RX ADMIN — GABAPENTIN 300 MG: 300 CAPSULE ORAL at 04:04

## 2024-04-23 RX ADMIN — DOCUSATE SODIUM AND SENNOSIDES 1 TABLET: 8.6; 5 TABLET, FILM COATED ORAL at 09:04

## 2024-04-23 RX ADMIN — METHOCARBAMOL 750 MG: 750 TABLET ORAL at 01:04

## 2024-04-23 RX ADMIN — HYDRALAZINE HYDROCHLORIDE 10 MG: 20 INJECTION INTRAMUSCULAR; INTRAVENOUS at 08:04

## 2024-04-23 RX ADMIN — IPRATROPIUM BROMIDE AND ALBUTEROL SULFATE 3 ML: 2.5; .5 SOLUTION RESPIRATORY (INHALATION) at 01:04

## 2024-04-23 RX ADMIN — METHOCARBAMOL 500 MG: 500 TABLET ORAL at 09:04

## 2024-04-23 NOTE — NURSING
Nurses Note -- 4 Eyes      4/23/2024   2:11 AM      Skin assessed during: Transfer      [x] No Altered Skin Integrity Present    []Prevention Measures Documented      [] Yes- Altered Skin Integrity Present or Discovered   [] LDA Added if Not in Epic (Describe Wound)   [] New Altered Skin Integrity was Present on Admit and Documented in LDA   [] Wound Image Taken    Wound Care Consulted? No    Attending Nurse:  Jessica Patel RN/Staff Member:   [place]

## 2024-04-23 NOTE — ASSESSMENT & PLAN NOTE
69 y.o. male smoker s/p OLTX 2013 (HCC), hyperthyroidism, BPH, and hx substance abuse who is POD1 s/p robotic right upper lobectomy with MLND    - CT to waterseal this AM. Follow up CXR to ascertain tolerance to waterseal. If lung appears stable, remain to waterseal  - Daily CXR while CT in place  - Daily Tacro level and BMP  - Hepatology/Liver transplant consulted following surgery. Appreciate recommendations and assistance with management  - Multimodal pain management: PRNs adjusted this AM  - OOB, ambulate as tolerated  - IS use  - Regular diet    Dispo: possible home later this week, Wednesday vs Thursday

## 2024-04-23 NOTE — SUBJECTIVE & OBJECTIVE
Interval History: NAEON. PCA pump initiated overnight, d/c'd this AM. Oral meds increased to adjust for chronic opioid history. CT x2 ( posterior: 24 fr straight CT; anterior: 24 fr lowell drain) on right with approx. 300 ml output and small air leak in lowell drain. CT on waterseal.     Medications:  Continuous Infusions:  Current Facility-Administered Medications   Medication Dose Route Frequency Last Rate Last Admin     Scheduled Meds:  Current Facility-Administered Medications   Medication Dose Route Frequency    acetaminophen  1,000 mg Oral Q8H    albuterol-ipratropium  3 mL Nebulization Q6H WAKE    enoxparin  40 mg Subcutaneous Daily    famotidine  20 mg Oral BID    gabapentin  300 mg Oral TID    methocarbamoL  500 mg Oral QID    polyethylene glycol  17 g Oral Daily    senna-docusate 8.6-50 mg  1 tablet Oral Daily    tacrolimus  1 mg Oral Daily AM    tamsulosin  0.4 mg Oral QHS     PRN Meds:  Current Facility-Administered Medications:     bisacodyL, 10 mg, Rectal, Daily PRN    hydrALAZINE, 10 mg, Intravenous, Q6H PRN    labetalol, 10 mg, Intravenous, Q6H PRN    metoclopramide, 5 mg, Intravenous, Q6H PRN    ondansetron, 8 mg, Oral, Q8H PRN    oxyCODONE, 15 mg, Oral, Q4H PRN    oxyCODONE, 10 mg, Oral, Q4H PRN     Review of patient's allergies indicates:   Allergen Reactions    Codeine Hives and Itching    Penicillins     Ciprofloxacin Rash     Objective:     Vital Signs (Most Recent):  Temp: 99.4 °F (37.4 °C) (04/23/24 0722)  Pulse: 82 (04/23/24 0722)  Resp: 18 (04/23/24 0722)  BP: (!) 184/89 (04/23/24 0722)  SpO2: (!) 94 % (04/23/24 0722) Vital Signs (24h Range):  Temp:  [98.1 °F (36.7 °C)-99.6 °F (37.6 °C)] 99.4 °F (37.4 °C)  Pulse:  [50-84] 82  Resp:  [11-30] 18  SpO2:  [90 %-100 %] 94 %  BP: (155-207)/() 184/89  Arterial Line BP: (176-185)/(77-82) 182/79     Intake/Output - Last 3 Shifts         04/21 0700  04/22 0659 04/22 0700 04/23 0659 04/23 0700 04/24 0659    IV Piggyback  750     Total  Intake(mL/kg)  750 (12.2)     Urine (mL/kg/hr)  1800     Emesis/NG output  25     Other  0     Stool  0     Chest Tube  305     Total Output  2130     Net  -1380            Urine Occurrence  1 x     Stool Occurrence  0 x             SpO2: (!) 94 %        Physical Exam  Constitutional:       Appearance: Normal appearance. He is normal weight.   Eyes:      Extraocular Movements: Extraocular movements intact.   Cardiovascular:      Rate and Rhythm: Normal rate and regular rhythm.      Pulses: Normal pulses.   Pulmonary:      Effort: Pulmonary effort is normal.      Breath sounds: Normal breath sounds.      Comments: CT x2 ( posterior: 24 fr straight CT; anterior: 24 fr lowell drain) on right   Abdominal:      General: Abdomen is flat.      Palpations: Abdomen is soft.   Skin:     General: Skin is warm and dry.   Neurological:      General: No focal deficit present.      Mental Status: He is alert and oriented to person, place, and time. Mental status is at baseline.   Psychiatric:         Mood and Affect: Mood normal.         Behavior: Behavior normal.         Thought Content: Thought content normal.            Significant Labs:  All pertinent labs from the last 24 hours have been reviewed.    Significant Diagnostics:  CXR: I have reviewed all pertinent results/findings within the past 24 hours    VTE Risk Mitigation (From admission, onward)           Ordered     enoxaparin injection 40 mg  Daily         04/22/24 1551     IP VTE HIGH RISK PATIENT  Once         04/22/24 1551     Place LISETH hose  Until discontinued         04/22/24 0842     Place sequential compression device  Until discontinued         04/22/24 0842

## 2024-04-23 NOTE — NURSING
Pt transferred to the floor at this time. A/Ox3-4. Aroused by voice/touch. No distress noted. Pt stated he was in pain. Reminded pt of PCA pump. Assessment completed at this time. Chest tube x2 to R chest put to suction per on-call LUCIANA Downey MD. Vitals stable. BP elevated. Called MD Shayan to report due to PRN parameters. Orders modified.
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
## 2024-04-23 NOTE — PLAN OF CARE
Problem: Adult Inpatient Plan of Care  Goal: Plan of Care Review  Outcome: Progressing  Goal: Patient-Specific Goal (Individualized)  Outcome: Progressing  Goal: Absence of Hospital-Acquired Illness or Injury  Outcome: Progressing  Goal: Optimal Comfort and Wellbeing  Outcome: Progressing  Goal: Readiness for Transition of Care  Outcome: Progressing     Problem: Fall Injury Risk  Goal: Absence of Fall and Fall-Related Injury  Outcome: Progressing     Problem: Skin Injury Risk Increased  Goal: Skin Health and Integrity  Outcome: Progressing

## 2024-04-23 NOTE — PT/OT/SLP PROGRESS
Occupational Therapy      Patient Name:  Mario Grier   MRN:  [id number]    [id number]:  Patient not seen today secondary to 8/10 mid back Pain; RN made aware of pain level. Obtained client living environment and PLOF during this time with information below. Will follow-up later as time permits.    Living Environment and PLOF:  Lives with significant other in trailer with 4 CHRIS and unilateral handrail; bathroom setup as walk-in shower with access to shower chair and has standard toilet.  Shower chair not in use PTA.  Previously independent in ADL, IADL, cooking/cleaning shared with significant other, and still driving.  Pt. Also A&OX4 at this time.      4/23/2024

## 2024-04-23 NOTE — HPI
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    Patient seen and examined at bedside today. He denies any complaints and reports only mild pain since surgery. He denies leg swelling, jaundice, abdominal pain or swelling. Hepatology was consulted for immunosuppressive management given history of liver transplant.

## 2024-04-23 NOTE — ANESTHESIA RELEASE NOTE
"Anesthesia Release from PACU Note    Patient: Mario Grier    Procedure(s) Performed: Procedure(s) (LRB):  XI ROBOTIC RIGHT UPPER  LOBECTOMY,LUNG (Right)  LYMPHADENECTOMY (Right)  BLOCK, NERVE, INTERCOSTAL, 2 OR MORE (Right)    Anesthesia type: general    Post pain: Adequate analgesia    Post assessment: no apparent anesthetic complications    Last Vitals: Visit Vitals  BP (!) 186/104   Pulse 64   Temp 36.7 °C (98.1 °F) (Temporal)   Resp 14   Ht 5' 6" (1.676 m)   Wt 61.7 kg (136 lb)   SpO2 99%   BMI 21.95 kg/m²       Post vital signs: stable    Level of consciousness: awake, alert , and oriented    Nausea/Vomiting: no nausea/no vomiting    Complications: none    Airway Patency: patent    Respiratory: unassisted    Cardiovascular: stable and blood pressure at baseline    Hydration: euvolemic  "

## 2024-04-23 NOTE — HPI
Patient is a 69 y.o. male smoker s/p OLTX 2013 (HCC), hyperthyroidism, BPH, and hx substance abuse here today for RUL NSCLC. Yearly CAP CT per transplant showed right upper lobe cavitary lesion. Referred to pulmonary. PET January 2024 showed RUL lesion with SUV 6.7 without mediastinal or hilar avidity. Percutaneous biopsy positive for adenocarcinoma. Completed PFTs. Comes today with updated chest CT. Reports weight loss and poor appetite.      Current smoker. ~ 50 pack years.   Liver transplant, appendectomy, orbital fracture (metal)

## 2024-04-23 NOTE — ASSESSMENT & PLAN NOTE
Patient with medical history of OLTx liver (2013) on chronic tacrolimus and home truvada given HbcAb + donor liver presenting with adenocarcinoma of the RUL now status-post RUL lobectomy with mediastinal lymph node dissection. Given new malignancy, would favor tacrolimus levels remaining low.     Recommendations:  - Continue dose of tacrolimus at 1 mg Q12H  - Daily Tacrolimus level   - Monitor liver function
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
## 2024-04-23 NOTE — PLAN OF CARE
[facility]  Initial Discharge Assessment       Primary Care Provider: Ariel Rodriguez III, MD    Admission Diagnosis: Adenocarcinoma of right lung [C34.91]  NSCLC of right lung [C34.91]    Admission Date: 4/22/2024  Expected Discharge Date: 4/26/2024    Transition of Care Barriers: None    Payor: HUMANA MANAGED MEDICARE / Plan: HUMANA MEDICARE HMO / Product Type: Capitation /     Extended Emergency Contact Information  Primary Emergency Contact: Lillian Wills  Address: [address]           [place] [address] [facility]  Home Phone: [phone]  Mobile Phone: [phone]  Relation: Spouse  Preferred language: English  Secondary Emergency Contact: Raquel Rosales   [place]  Mobile Phone: [phone]  Relation: Daughter    Discharge Plan A: Home with family  Discharge Plan B: Home Health      [facility] - [facility] [address] [facility]  [address] [facility] [place] [address]  Phone: [phone] Fax: [phone]    [id number] - [place], [place] - [address] [address]  Phone: [phone] Fax: [phone]      Initial Assessment (most recent)       Adult Discharge Assessment - 04/23/24 1527          Discharge Assessment    Assessment Type Discharge Planning Assessment     Confirmed/corrected address, phone number and insurance Yes     Confirmed Demographics Correct on Facesheet     Source of Information patient     Communicated BEATRIZ with patient/caregiver Yes     People in Home spouse     Do you expect to return to your current living situation? Yes     Do you have help at home or someone to help you manage your care at home? No     Prior to hospitilization cognitive status: Alert/Oriented     Current cognitive status: Alert/Oriented     Home Layout Able to live on 1st floor     Do you take prescription medications? Yes     Do you have prescription coverage? Yes     Do you have any  problems affording any of your prescribed medications? No     Is the patient taking medications as prescribed? yes     Who is going to help you get home at discharge? wife     How do you get to doctors appointments? car, drives self     Are you on dialysis? No     Do you take coumadin? No     Discharge Plan A Home with family     Discharge Plan B Home Health     Discharge Plan discussed with: Patient     Transition of Care Barriers None     SDOH --   no       Physical Activity    On average, how many days per week do you engage in moderate to strenuous exercise (like a brisk walk)? 0 days     On average, how many minutes do you engage in exercise at this level? 0 min        Financial Resource Strain    How hard is it for you to pay for the very basics like food, housing, medical care, and heating? Not hard at all        Housing Stability    In the last 12 months, was there a time when you were not able to pay the mortgage or rent on time? No     At any time in the past 12 months, were you homeless or living in a shelter (including now)? No        Transportation Needs    In the past 12 months, has lack of transportation kept you from medical appointments or from getting medications? No     In the past 12 months, has lack of transportation kept you from meetings, work, or from getting things needed for daily living? No        Food Insecurity    Within the past 12 months, you worried that your food would run out before you got the money to buy more. Never true     Within the past 12 months, the food you bought just didn't last and you didn't have money to get more. Never true        Stress    Do you feel stress - tense, restless, nervous, or anxious, or unable to sleep at night because your mind is troubled all the time - these days? Not at all        Social Connections    In a typical week, how many times do you talk on the phone with family, friends, or neighbors? More than three times a week     How often do you get  together with friends or relatives? More than three times a week     How often do you attend Sabianism or Spiritism services? More than 4 times per year     Do you belong to any clubs or organizations such as Sabianism groups, unions, fraternal or athletic groups, or school groups? No     How often do you attend meetings of the clubs or organizations you belong to? Never     Are you , , , , never , or living with a partner?         Alcohol Use    Q1: How often do you have a drink containing alcohol? Never     Q2: How many drinks containing alcohol do you have on a typical day when you are drinking? Patient does not drink     Q3: How often do you have six or more drinks on one occasion? Never                   The CM met with the patient at bedside to complete the DPA.  The CM placed name and contact information on the blackboard in the patient's room.  Use preferred pharmacy / bedside delivery for any necessary  medications at the time of discharge.The patient is independent with all ADLs. The patient is not on Dialysis or Coumadin. The patient's wife will provide assistance to the patient upon discharge. The patient's wife will provide transportation upon discharge .  The CM will continue to follow for course of hospitalization.

## 2024-04-23 NOTE — HOSPITAL COURSE
Patient admitted Monday, 04/22/24, following robotic right upper lobectomy with MLND. Tolerated procedure well. POD0 complaints of pain overnight and placed on PCA pump by on-call intern. POD1 PCA pump discontinued and oral medications adjusted for history of substance abuse and chronic opioids reported in patient's medical record. Chest tube x2 in place on right side with approx. 300 ml serosanguinous output and small air leak in 24 fr lowell drain. POD2 24 fr chest tube removed. Episode of SVT evening of POD2. Rapid response called. Intern on-call pushed adenosine x2 with 15 second pause, did not convert. EP Cardiology consulted, recommended metoprolol which resulted in conversion back to NSR. POD3 patient doing well, low chest tube output. Air leak remains upon interrogation of atrium. Additionally, patient experience 2nd episode of SVT requiring 5 mg metoprolol IV x4 prior to conversion back to NSR. Started on PO metoprolol 12.5 mg TID without incident since initiation. POD4 patient's chest tube connected to mini atrium, HDS, NSR, pain controlled, and ready for discharge home.

## 2024-04-23 NOTE — NURSING
"[facility] Plan of Care Note    Dx:   Adenocarcinoma of right lung [C34.91]  NSCLC of right lung [C34.91]    Shift Events: Pt transferred to the floor at this time. A/Ox3-4. Aroused by voice/touch. No distress noted. Pt stated he was in pain. Reminded pt of PCA pump. Assessment completed at this time. Chest tube x2 to R chest put to suction per on-call LUCIANA Downey MD. Vitals stable. BP elevated. In need of cardio consult. Called MD Shayan to report due to PRN parameters. Orders modified.       Goals of Care: Ongoing and progressing.    Neuro: A/Ox4 with voice/touch stimulation    Vital Signs: BP (!) 184/89 (BP Location: Left arm, Patient Position: Lying)   Pulse 82   Temp 99.4 °F (37.4 °C) (Axillary)   Resp 18   Ht 5' 6" (1.676 m)   Wt 61.7 kg (136 lb)   SpO2 (!) 94%   BMI 21.95 kg/m²     Respiratory: 2lNC PRN    Diet: Diet Adult Regular (IDDSI Level 7)      Is patient tolerating current diet? Yes    GTTS: NA    Urine Output/Bowel Movement:   Urinal, LBM 4/21         Drains/Tubes/Tube Feeds (include total output/shift):   Chest tube to R (2)      Lines: PIV L forearm      Accuchecks:NA    Skin: Tube insertion sites    Fall Risk Score: High    Activity level? Bedrest, mentation altered     Any scheduled procedures? NA    Any safety concerns? NA      "

## 2024-04-23 NOTE — CARE UPDATE
Paged by PACU nurse about patient with BP in 180s systolic unresponsive to prn labetalol and hydralazine. Also reporting 10/10 pain requiring multiple doses of prn dilaudid. On evaluation of the patient he reports right sided back pain and pain around his two right sided chest tubes. Chest tubes with total 230ml bloody output over course of 7 hrs post op. Telemetry showing peak T wave overtaking QRS complex, but patient denies chest pain, SOB, dizziness, headache, and confusion.    STAT EKG, cbc, bmp, and mg ordered. EKG showing normal sinus rhythm and normal T waves. Labs pending. PCA started for pain control as his hypertension is likely related to inadequate pain control. Continue prn hydralazine and labetalol. Case discussed with chief resident on call. Will monitor for changes in exam and reassess for pain control on PCA.    0040 Update  Labs within normal limits. BP responding appropriately to pain control, systolics improved to 160s. One episode of  small volume emesis in PACU, nausea responded to prn anti-emetics. Stable to transfer to floor.    Cassius Downey MD  General Surgery  4/23/2024
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
## 2024-04-23 NOTE — ANESTHESIA POSTPROCEDURE EVALUATION
Anesthesia Post Evaluation    Patient: Mario Grier    Procedure(s) Performed: Procedure(s) (LRB):  XI ROBOTIC RIGHT UPPER  LOBECTOMY,LUNG (Right)  LYMPHADENECTOMY (Right)  BLOCK, NERVE, INTERCOSTAL, 2 OR MORE (Right)    Final Anesthesia Type: general      Patient location during evaluation: PACU  Patient participation: Yes- Able to Participate  Level of consciousness: awake  Post-procedure vital signs: reviewed and stable  Pain management: pain needs to be addressed (Patient requiring additional pain medication in PACU)  Airway patency: patent    PONV status at discharge: No PONV  Anesthetic complications: no      Cardiovascular status: blood pressure returned to baseline  Respiratory status: unassisted and spontaneous ventilation  Hydration status: euvolemic  Follow-up not needed.          Vitals Value Taken Time   /88 04/23/24 0534   Temp 37.6 °C (99.6 °F) 04/23/24 0447   Pulse 79 04/23/24 0447   Resp 18 04/23/24 0447   SpO2 95 % 04/23/24 0447         Event Time   Out of Recovery 04/22/2024 19:00:00         Pain/Tadeo Score: Pain Rating Prior to Med Admin: 6 (4/23/2024  5:21 AM)  Pain Rating Post Med Admin: 5 (4/23/2024 12:43 AM)  Tadeo Score: 8 (4/22/2024  6:00 PM)

## 2024-04-23 NOTE — PROGRESS NOTES
[facility]  Thoracic Surgery  Progress Note    Subjective:     History of Present Illness:  Patient is a 69 y.o. male smoker s/p OLTX 2013 (HCC), hyperthyroidism, BPH, and hx substance abuse here today for RUL NSCLC. Yearly CAP CT per transplant showed right upper lobe cavitary lesion. Referred to pulmonary. PET January 2024 showed RUL lesion with SUV 6.7 without mediastinal or hilar avidity. Percutaneous biopsy positive for adenocarcinoma. Completed PFTs. Comes today with updated chest CT. Reports weight loss and poor appetite.      Current smoker. ~ 50 pack years.   Liver transplant, appendectomy, orbital fracture (metal)     Post-Op Info:  Procedure(s) (LRB):  XI ROBOTIC RIGHT UPPER  LOBECTOMY,LUNG (Right)  LYMPHADENECTOMY (Right)  BLOCK, NERVE, INTERCOSTAL, 2 OR MORE (Right)   1 Day Post-Op     Interval History: NAEON. PCA pump initiated overnight, d/c'd this AM. Oral meds increased to adjust for chronic opioid history. CT x2 ( posterior: 24 fr straight CT; anterior: 24 fr lowell drain) on right with approx. 300 ml output and small air leak in lowell drain. CT on waterseal.     Medications:  Continuous Infusions:  Current Facility-Administered Medications   Medication Dose Route Frequency Last Rate Last Admin     Scheduled Meds:  Current Facility-Administered Medications   Medication Dose Route Frequency    acetaminophen  1,000 mg Oral Q8H    albuterol-ipratropium  3 mL Nebulization Q6H WAKE    enoxparin  40 mg Subcutaneous Daily    famotidine  20 mg Oral BID    gabapentin  300 mg Oral TID    methocarbamoL  500 mg Oral QID    polyethylene glycol  17 g Oral Daily    senna-docusate 8.6-50 mg  1 tablet Oral Daily    tacrolimus  1 mg Oral Daily AM    tamsulosin  0.4 mg Oral QHS     PRN Meds:  Current Facility-Administered Medications:     bisacodyL, 10 mg, Rectal, Daily PRN    hydrALAZINE, 10 mg, Intravenous, Q6H PRN    labetalol, 10 mg, Intravenous, Q6H PRN    metoclopramide, 5 mg, Intravenous, Q6H PRN     ondansetron, 8 mg, Oral, Q8H PRN    oxyCODONE, 15 mg, Oral, Q4H PRN    oxyCODONE, 10 mg, Oral, Q4H PRN     Review of patient's allergies indicates:   Allergen Reactions    Codeine Hives and Itching    Penicillins     Ciprofloxacin Rash     Objective:     Vital Signs (Most Recent):  Temp: 99.4 °F (37.4 °C) (04/23/24 0722)  Pulse: 82 (04/23/24 0722)  Resp: 18 (04/23/24 0722)  BP: (!) 184/89 (04/23/24 0722)  SpO2: (!) 94 % (04/23/24 0722) Vital Signs (24h Range):  Temp:  [98.1 °F (36.7 °C)-99.6 °F (37.6 °C)] 99.4 °F (37.4 °C)  Pulse:  [50-84] 82  Resp:  [11-30] 18  SpO2:  [90 %-100 %] 94 %  BP: (155-207)/() 184/89  Arterial Line BP: (176-185)/(77-82) 182/79     Intake/Output - Last 3 Shifts         04/21 0700  04/22 0659 04/22 0700 04/23 0659 04/23 0700 04/24 0659    IV Piggyback  750     Total Intake(mL/kg)  750 (12.2)     Urine (mL/kg/hr)  1800     Emesis/NG output  25     Other  0     Stool  0     Chest Tube  305     Total Output  2130     Net  -1380            Urine Occurrence  1 x     Stool Occurrence  0 x             SpO2: (!) 94 %        Physical Exam  Constitutional:       Appearance: Normal appearance. He is normal weight.   Eyes:      Extraocular Movements: Extraocular movements intact.   Cardiovascular:      Rate and Rhythm: Normal rate and regular rhythm.      Pulses: Normal pulses.   Pulmonary:      Effort: Pulmonary effort is normal.      Breath sounds: Normal breath sounds.      Comments: CT x2 ( posterior: 24 fr straight CT; anterior: 24 fr lowell drain) on right   Abdominal:      General: Abdomen is flat.      Palpations: Abdomen is soft.   Skin:     General: Skin is warm and dry.   Neurological:      General: No focal deficit present.      Mental Status: He is alert and oriented to person, place, and time. Mental status is at baseline.   Psychiatric:         Mood and Affect: Mood normal.         Behavior: Behavior normal.         Thought Content: Thought content normal.            Significant  Labs:  All pertinent labs from the last 24 hours have been reviewed.    Significant Diagnostics:  CXR: I have reviewed all pertinent results/findings within the past 24 hours    VTE Risk Mitigation (From admission, onward)           Ordered     enoxaparin injection 40 mg  Daily         04/22/24 1551     IP VTE HIGH RISK PATIENT  Once         04/22/24 1551     Place LSIETH hose  Until discontinued         04/22/24 0842     Place sequential compression device  Until discontinued         04/22/24 0842                  Assessment/Plan:     * Adenocarcinoma of right lung  69 y.o. male smoker s/p OLTX 2013 (HCC), hyperthyroidism, BPH, and hx substance abuse who is POD1 s/p robotic right upper lobectomy with MLND    - CT to waterseal this AM. Follow up CXR to ascertain tolerance to waterseal. If lung appears stable, remain to waterseal  - Daily CXR while CT in place  - Daily Tacro level and BMP  - Hepatology/Liver transplant consulted following surgery. Appreciate recommendations and assistance with management  - Multimodal pain management: PRNs adjusted this AM  - OOB, ambulate as tolerated  - IS use  - Regular diet    Dispo: possible home later this week, Wednesday vs Thursday        Cezar Portillo PA-C  Thoracic Surgery  [facility] [place]

## 2024-04-23 NOTE — PROGRESS NOTES
1900; Anesthetist Dr. Moore at bedside. Assessed patient as the patient's blood  pressure was 197/93 mm of Hg even after treating with Inj. Labetalol 40mg (Total).  Ordered to give Inj.Hydralazine 10 mg.Administered the prescribed medication to the patient.    1935; patient's Blood Pressure was recorded 179/81 mm of Hg.    Anesthetist has documented Anesthesia release note for the patient as the patient still drowsy & on Oxygen , not met the Tadeo score 9.

## 2024-04-23 NOTE — NURSING TRANSFER
Nursing Transfer Note      4/23/2024   1:00 AM    Nurse giving handoff:GAEL Gramajo 3  Nurse receiving handoff:GAEL Swain    Reason patient is being transferred: post surgery    Transfer To: 1004 from PACU    Transfer via bed    Transfer with O2, cardiac monitoring    Transported by RN  and Transport    Transfer Vital Signs:  Blood Pressure:159/89  Heart Rate:76  O2:95  Temperature:98.4  Respirations:22    Order for Tele Monitor? Yes    Additional Lines: Suction and Chest Tube    Medicines sent: Dilaudid PCA/ET monitor    Any special needs or follow-up needed: per ordedrs    Patient belongings transferred with patient: None in PACU    Chart send with patient: Yes    Notified: spouse via epic messanger    Patient reassessed at: 4/23/2024 00:43    Upon arrival to floor: cardiac monitor applied, patient oriented to room, call bell in reach, and bed in lowest position

## 2024-04-23 NOTE — PROGRESS NOTES
Notified Gen Surgery MD patient with elevated blood pressure and pain not in control. MD to come to bedside. Dr. JOSE LUIS Downey came to bedside, ordered stat labs, EKG, PCA and PRN blood pressure medications.

## 2024-04-24 PROBLEM — E87.6 HYPOKALEMIA: Status: ACTIVE | Noted: 2024-04-24

## 2024-04-24 PROBLEM — E87.1 HYPONATREMIA: Status: ACTIVE | Noted: 2024-04-24

## 2024-04-24 LAB
ALBUMIN SERPL BCP-MCNC: 3.6 G/DL (ref 3.5–5.2)
ALBUMIN SERPL BCP-MCNC: 3.6 G/DL (ref 3.5–5.2)
ALP SERPL-CCNC: 102 U/L (ref 55–135)
ALP SERPL-CCNC: 102 U/L (ref 55–135)
ALT SERPL W/O P-5'-P-CCNC: 39 U/L (ref 10–44)
ALT SERPL W/O P-5'-P-CCNC: 39 U/L (ref 10–44)
ANION GAP SERPL CALC-SCNC: 13 MMOL/L (ref 8–16)
ANION GAP SERPL CALC-SCNC: 13 MMOL/L (ref 8–16)
AST SERPL-CCNC: 32 U/L (ref 10–40)
AST SERPL-CCNC: 32 U/L (ref 10–40)
BILIRUB SERPL-MCNC: 1.2 MG/DL (ref 0.1–1)
BILIRUB SERPL-MCNC: 1.2 MG/DL (ref 0.1–1)
BUN SERPL-MCNC: 27 MG/DL (ref 8–23)
BUN SERPL-MCNC: 27 MG/DL (ref 8–23)
CALCIUM SERPL-MCNC: 9.4 MG/DL (ref 8.7–10.5)
CALCIUM SERPL-MCNC: 9.4 MG/DL (ref 8.7–10.5)
CHLORIDE SERPL-SCNC: 99 MMOL/L (ref 95–110)
CHLORIDE SERPL-SCNC: 99 MMOL/L (ref 95–110)
CO2 SERPL-SCNC: 21 MMOL/L (ref 23–29)
CO2 SERPL-SCNC: 21 MMOL/L (ref 23–29)
CREAT SERPL-MCNC: 0.9 MG/DL (ref 0.5–1.4)
CREAT SERPL-MCNC: 0.9 MG/DL (ref 0.5–1.4)
EST. GFR  (NO RACE VARIABLE): >60 ML/MIN/1.73 M^2
EST. GFR  (NO RACE VARIABLE): >60 ML/MIN/1.73 M^2
GLUCOSE SERPL-MCNC: 134 MG/DL (ref 70–110)
GLUCOSE SERPL-MCNC: 134 MG/DL (ref 70–110)
OHS QRS DURATION: 74 MS
OHS QTC CALCULATION: 425 MS
POTASSIUM SERPL-SCNC: 3.4 MMOL/L (ref 3.5–5.1)
POTASSIUM SERPL-SCNC: 3.4 MMOL/L (ref 3.5–5.1)
PROT SERPL-MCNC: 7.4 G/DL (ref 6–8.4)
PROT SERPL-MCNC: 7.4 G/DL (ref 6–8.4)
SODIUM SERPL-SCNC: 133 MMOL/L (ref 136–145)
SODIUM SERPL-SCNC: 133 MMOL/L (ref 136–145)
TACROLIMUS BLD-MCNC: 4.7 NG/ML (ref 5–15)

## 2024-04-24 PROCEDURE — 93005 ELECTROCARDIOGRAM TRACING: CPT

## 2024-04-24 PROCEDURE — 94640 AIRWAY INHALATION TREATMENT: CPT

## 2024-04-24 PROCEDURE — 83735 ASSAY OF MAGNESIUM: CPT

## 2024-04-24 PROCEDURE — 25000003 PHARM REV CODE 250

## 2024-04-24 PROCEDURE — 63600175 PHARM REV CODE 636 W HCPCS

## 2024-04-24 PROCEDURE — 36415 COLL VENOUS BLD VENIPUNCTURE: CPT | Performed by: STUDENT IN AN ORGANIZED HEALTH CARE EDUCATION/TRAINING PROGRAM

## 2024-04-24 PROCEDURE — 80197 ASSAY OF TACROLIMUS: CPT | Performed by: STUDENT IN AN ORGANIZED HEALTH CARE EDUCATION/TRAINING PROGRAM

## 2024-04-24 PROCEDURE — 97165 OT EVAL LOW COMPLEX 30 MIN: CPT

## 2024-04-24 PROCEDURE — 25000003 PHARM REV CODE 250: Performed by: STUDENT IN AN ORGANIZED HEALTH CARE EDUCATION/TRAINING PROGRAM

## 2024-04-24 PROCEDURE — 80053 COMPREHEN METABOLIC PANEL: CPT | Mod: 91

## 2024-04-24 PROCEDURE — 80053 COMPREHEN METABOLIC PANEL: CPT | Performed by: STUDENT IN AN ORGANIZED HEALTH CARE EDUCATION/TRAINING PROGRAM

## 2024-04-24 PROCEDURE — 93010 ELECTROCARDIOGRAM REPORT: CPT | Mod: ,,, | Performed by: INTERNAL MEDICINE

## 2024-04-24 PROCEDURE — 27000221 HC OXYGEN, UP TO 24 HOURS

## 2024-04-24 PROCEDURE — 93010 ELECTROCARDIOGRAM REPORT: CPT | Mod: 76,,, | Performed by: INTERNAL MEDICINE

## 2024-04-24 PROCEDURE — 20600001 HC STEP DOWN PRIVATE ROOM

## 2024-04-24 PROCEDURE — 25000242 PHARM REV CODE 250 ALT 637 W/ HCPCS

## 2024-04-24 PROCEDURE — 97161 PT EVAL LOW COMPLEX 20 MIN: CPT

## 2024-04-24 PROCEDURE — 63600175 PHARM REV CODE 636 W HCPCS: Performed by: STUDENT IN AN ORGANIZED HEALTH CARE EDUCATION/TRAINING PROGRAM

## 2024-04-24 PROCEDURE — 94761 N-INVAS EAR/PLS OXIMETRY MLT: CPT

## 2024-04-24 RX ORDER — ADENOSINE 3 MG/ML
12 INJECTION, SOLUTION INTRAVENOUS ONCE
Status: COMPLETED | OUTPATIENT
Start: 2024-04-25 | End: 2024-04-24

## 2024-04-24 RX ORDER — ADENOSINE 3 MG/ML
INJECTION, SOLUTION INTRAVENOUS
Status: DISPENSED
Start: 2024-04-24 | End: 2024-04-25

## 2024-04-24 RX ORDER — ADENOSINE 3 MG/ML
6 INJECTION, SOLUTION INTRAVENOUS ONCE
Status: COMPLETED | OUTPATIENT
Start: 2024-04-25 | End: 2024-04-24

## 2024-04-24 RX ORDER — METOPROLOL TARTRATE 1 MG/ML
5 INJECTION, SOLUTION INTRAVENOUS ONCE
Status: DISCONTINUED | OUTPATIENT
Start: 2024-04-25 | End: 2024-04-24

## 2024-04-24 RX ORDER — METOPROLOL TARTRATE 1 MG/ML
INJECTION, SOLUTION INTRAVENOUS
Status: DISPENSED
Start: 2024-04-24 | End: 2024-04-25

## 2024-04-24 RX ORDER — METOPROLOL TARTRATE 1 MG/ML
5 INJECTION, SOLUTION INTRAVENOUS ONCE
Status: COMPLETED | OUTPATIENT
Start: 2024-04-25 | End: 2024-04-24

## 2024-04-24 RX ORDER — LANOLIN ALCOHOL/MO/W.PET/CERES
400 CREAM (GRAM) TOPICAL ONCE
Status: COMPLETED | OUTPATIENT
Start: 2024-04-24 | End: 2024-04-24

## 2024-04-24 RX ADMIN — HYDRALAZINE HYDROCHLORIDE 10 MG: 20 INJECTION INTRAMUSCULAR; INTRAVENOUS at 07:04

## 2024-04-24 RX ADMIN — METOROPROLOL TARTRATE 5 MG: 5 INJECTION, SOLUTION INTRAVENOUS at 11:04

## 2024-04-24 RX ADMIN — ENOXAPARIN SODIUM 40 MG: 40 INJECTION SUBCUTANEOUS at 05:04

## 2024-04-24 RX ADMIN — HYDRALAZINE HYDROCHLORIDE 10 MG: 20 INJECTION INTRAMUSCULAR; INTRAVENOUS at 04:04

## 2024-04-24 RX ADMIN — OXYCODONE HYDROCHLORIDE 15 MG: 10 TABLET ORAL at 11:04

## 2024-04-24 RX ADMIN — IPRATROPIUM BROMIDE AND ALBUTEROL SULFATE 3 ML: 2.5; .5 SOLUTION RESPIRATORY (INHALATION) at 09:04

## 2024-04-24 RX ADMIN — DOCUSATE SODIUM AND SENNOSIDES 1 TABLET: 8.6; 5 TABLET, FILM COATED ORAL at 09:04

## 2024-04-24 RX ADMIN — FAMOTIDINE 20 MG: 20 TABLET ORAL at 09:04

## 2024-04-24 RX ADMIN — LIDOCAINE 5% 1 PATCH: 700 PATCH TOPICAL at 01:04

## 2024-04-24 RX ADMIN — ADENOSINE 12 MG: 3 INJECTION, SOLUTION INTRAVENOUS at 11:04

## 2024-04-24 RX ADMIN — POLYETHYLENE GLYCOL 3350 17 G: 17 POWDER, FOR SOLUTION ORAL at 09:04

## 2024-04-24 RX ADMIN — ACETAMINOPHEN 1000 MG: 500 TABLET ORAL at 01:04

## 2024-04-24 RX ADMIN — ACETAMINOPHEN 1000 MG: 500 TABLET ORAL at 05:04

## 2024-04-24 RX ADMIN — TAMSULOSIN HYDROCHLORIDE 0.4 MG: 0.4 CAPSULE ORAL at 09:04

## 2024-04-24 RX ADMIN — GABAPENTIN 300 MG: 300 CAPSULE ORAL at 02:04

## 2024-04-24 RX ADMIN — TACROLIMUS 1 MG: 1 CAPSULE ORAL at 11:04

## 2024-04-24 RX ADMIN — GABAPENTIN 300 MG: 300 CAPSULE ORAL at 09:04

## 2024-04-24 RX ADMIN — OXYCODONE HYDROCHLORIDE 15 MG: 10 TABLET ORAL at 09:04

## 2024-04-24 RX ADMIN — ACETAMINOPHEN 1000 MG: 500 TABLET ORAL at 09:04

## 2024-04-24 RX ADMIN — ADENOSINE 6 MG: 3 INJECTION, SOLUTION INTRAVENOUS at 11:04

## 2024-04-24 RX ADMIN — METHOCARBAMOL 750 MG: 750 TABLET ORAL at 05:04

## 2024-04-24 RX ADMIN — METHOCARBAMOL 750 MG: 750 TABLET ORAL at 09:04

## 2024-04-24 RX ADMIN — POTASSIUM BICARBONATE 40 MEQ: 391 TABLET, EFFERVESCENT ORAL at 02:04

## 2024-04-24 RX ADMIN — TACROLIMUS 1 MG: 1 CAPSULE ORAL at 05:04

## 2024-04-24 RX ADMIN — Medication 400 MG: at 02:04

## 2024-04-24 RX ADMIN — METHOCARBAMOL 750 MG: 750 TABLET ORAL at 01:04

## 2024-04-24 RX ADMIN — IPRATROPIUM BROMIDE AND ALBUTEROL SULFATE 3 ML: 2.5; .5 SOLUTION RESPIRATORY (INHALATION) at 08:04

## 2024-04-24 RX ADMIN — OXYCODONE HYDROCHLORIDE 15 MG: 10 TABLET ORAL at 04:04

## 2024-04-24 NOTE — NURSING
Original dressing removed from site of anterior chest tube, new gauze and tegaderm applied, will continue to monitor.
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
## 2024-04-24 NOTE — NURSING
Received call from thoracic surgery team in regards to HR sustaining in the 110's, per team observe for now.

## 2024-04-24 NOTE — SUBJECTIVE & OBJECTIVE
Interval History:   No acute events overnight.   AFVSS.   States still having severe pain, but did sleep well overnight.   Did not ambulate yesterday.   In chair this AM.   Chest tube outputs with 134cc and 7cc serosanguinous output. Small air leak from 24F lowell chest tube. Regular chest tube without air leak.   Morning CXR stable.     Medications:  Continuous Infusions:  Current Facility-Administered Medications   Medication Dose Route Frequency Last Rate Last Admin     Scheduled Meds:  Current Facility-Administered Medications   Medication Dose Route Frequency    acetaminophen  1,000 mg Oral Q8H    albuterol-ipratropium  3 mL Nebulization Q6H WAKE    enoxparin  40 mg Subcutaneous Daily    famotidine  20 mg Oral BID    gabapentin  300 mg Oral TID    LIDOcaine  1 patch Transdermal Q24H    methocarbamoL  750 mg Oral QID    polyethylene glycol  17 g Oral Daily    senna-docusate 8.6-50 mg  1 tablet Oral Daily    tacrolimus  1 mg Oral BID    tamsulosin  0.4 mg Oral QHS     PRN Meds:  Current Facility-Administered Medications:     bisacodyL, 10 mg, Rectal, Daily PRN    hydrALAZINE, 10 mg, Intravenous, Q6H PRN    labetalol, 10 mg, Intravenous, Q6H PRN    metoclopramide, 5 mg, Intravenous, Q6H PRN    ondansetron, 8 mg, Oral, Q8H PRN    oxyCODONE, 15 mg, Oral, Q6H PRN     Review of patient's allergies indicates:   Allergen Reactions    Codeine Hives and Itching    Penicillins     Ciprofloxacin Rash     Objective:     Vital Signs (Most Recent):  Temp: 97.4 °F (36.3 °C) (04/24/24 0725)  Pulse: 105 (04/24/24 0805)  Resp: 18 (04/24/24 1103)  BP: (!) 160/98 (04/24/24 0725)  SpO2: 96 % (04/24/24 0805) Vital Signs (24h Range):  Temp:  [97.4 °F (36.3 °C)-99.4 °F (37.4 °C)] 97.4 °F (36.3 °C)  Pulse:  [] 105  Resp:  [16-20] 18  SpO2:  [93 %-96 %] 96 %  BP: (157-199)/() 160/98     Intake/Output - Last 3 Shifts         04/22 0700 04/23 0659 04/23 0700 04/24 0659 04/24 0700 04/25 0659    P.O.   480    IV Piggyback 750       Total Intake(mL/kg) 750 (12.2)  480 (7.8)    Urine (mL/kg/hr) 1800 0 (0)     Emesis/NG output 25      Other 0      Stool 0      Chest Tube 305 141     Total Output 2130 141     Net -1380 -141 +480           Urine Occurrence 1 x 5 x     Stool Occurrence 0 x              SpO2: 96 %        Physical Exam  Constitutional:       Appearance: Normal appearance. He is normal weight.   Eyes:      Extraocular Movements: Extraocular movements intact.   Cardiovascular:      Rate and Rhythm: Normal rate and regular rhythm.      Pulses: Normal pulses.   Pulmonary:      Effort: Pulmonary effort is normal.      Breath sounds: Normal breath sounds.      Comments: CT x2 ( posterior: 24 fr straight CT; anterior: 24 fr lowell drain) on right   Abdominal:      General: Abdomen is flat.      Palpations: Abdomen is soft.   Skin:     General: Skin is warm and dry.   Neurological:      General: No focal deficit present.      Mental Status: He is alert and oriented to person, place, and time. Mental status is at baseline.   Psychiatric:         Mood and Affect: Mood normal.         Behavior: Behavior normal.         Thought Content: Thought content normal.            Significant Labs:  All pertinent labs from the last 24 hours have been reviewed.    Significant Diagnostics:  CXR: I have reviewed all pertinent results/findings within the past 24 hours    VTE Risk Mitigation (From admission, onward)           Ordered     enoxaparin injection 40 mg  Daily         04/22/24 1551     IP VTE HIGH RISK PATIENT  Once         04/22/24 1551     Place LISETH hose  Until discontinued         04/22/24 0842     Place sequential compression device  Until discontinued         04/22/24 0842

## 2024-04-24 NOTE — NURSING
Pt HR increased to 140s, sustained for about 7seconds. Dropped to 120s and fluctuated between 90s-120s. Pt complain of R sided chest pain near chest tube insertion site. Called on-call MD Darshana to report. MD at bedside to assess.

## 2024-04-24 NOTE — PROGRESS NOTES
[facility]  Thoracic Surgery  Progress Note    Subjective:     History of Present Illness:  Patient is a 69 y.o. male smoker s/p OLTX 2013 (HCC), hyperthyroidism, BPH, and hx substance abuse here today for RUL NSCLC. Yearly CAP CT per transplant showed right upper lobe cavitary lesion. Referred to pulmonary. PET January 2024 showed RUL lesion with SUV 6.7 without mediastinal or hilar avidity. Percutaneous biopsy positive for adenocarcinoma. Completed PFTs. Comes today with updated chest CT. Reports weight loss and poor appetite.      Current smoker. ~ 50 pack years.   Liver transplant, appendectomy, orbital fracture (metal)     Post-Op Info:  Procedure(s) (LRB):  XI ROBOTIC RIGHT UPPER  LOBECTOMY,LUNG (Right)  LYMPHADENECTOMY (Right)  BLOCK, NERVE, INTERCOSTAL, 2 OR MORE (Right)   2 Days Post-Op     Interval History:   No acute events overnight.   AFVSS.   States still having severe pain, but did sleep well overnight.   Did not ambulate yesterday.   In chair this AM.   Chest tube outputs with 134cc and 7cc serosanguinous output. Small air leak from 24F lowell drain. Regular chest tube without air leak.   Morning CXR stable.     Medications:  Continuous Infusions:  Current Facility-Administered Medications   Medication Dose Route Frequency Last Rate Last Admin     Scheduled Meds:  Current Facility-Administered Medications   Medication Dose Route Frequency    acetaminophen  1,000 mg Oral Q8H    albuterol-ipratropium  3 mL Nebulization Q6H WAKE    enoxparin  40 mg Subcutaneous Daily    famotidine  20 mg Oral BID    gabapentin  300 mg Oral TID    LIDOcaine  1 patch Transdermal Q24H    methocarbamoL  750 mg Oral QID    polyethylene glycol  17 g Oral Daily    senna-docusate 8.6-50 mg  1 tablet Oral Daily    tacrolimus  1 mg Oral BID    tamsulosin  0.4 mg Oral QHS     PRN Meds:  Current Facility-Administered Medications:     bisacodyL, 10 mg, Rectal, Daily PRN    hydrALAZINE, 10 mg, Intravenous, Q6H PRN    labetalol,  10 mg, Intravenous, Q6H PRN    metoclopramide, 5 mg, Intravenous, Q6H PRN    ondansetron, 8 mg, Oral, Q8H PRN    oxyCODONE, 15 mg, Oral, Q6H PRN     Review of patient's allergies indicates:   Allergen Reactions    Codeine Hives and Itching    Penicillins     Ciprofloxacin Rash     Objective:     Vital Signs (Most Recent):  Temp: 97.4 °F (36.3 °C) (04/24/24 0725)  Pulse: 105 (04/24/24 0805)  Resp: 18 (04/24/24 1103)  BP: (!) 160/98 (04/24/24 0725)  SpO2: 96 % (04/24/24 0805) Vital Signs (24h Range):  Temp:  [97.4 °F (36.3 °C)-99.4 °F (37.4 °C)] 97.4 °F (36.3 °C)  Pulse:  [] 105  Resp:  [16-20] 18  SpO2:  [93 %-96 %] 96 %  BP: (157-199)/() 160/98     Intake/Output - Last 3 Shifts         04/22 0700  04/23 0659 04/23 0700  04/24 0659 04/24 0700  04/25 0659    P.O.   480    IV Piggyback 750      Total Intake(mL/kg) 750 (12.2)  480 (7.8)    Urine (mL/kg/hr) 1800 0 (0)     Emesis/NG output 25      Other 0      Stool 0      Chest Tube 305 141     Total Output 2130 141     Net -1380 -141 +480           Urine Occurrence 1 x 5 x     Stool Occurrence 0 x              SpO2: 96 %        Physical Exam  Constitutional:       Appearance: Normal appearance. He is normal weight.   Eyes:      Extraocular Movements: Extraocular movements intact.   Cardiovascular:      Rate and Rhythm: Normal rate and regular rhythm.      Pulses: Normal pulses.   Pulmonary:      Effort: Pulmonary effort is normal.      Breath sounds: Normal breath sounds.      Comments: CT x2 ( posterior: 24 fr straight CT; anterior: 24 fr lowell drain) on right   Abdominal:      General: Abdomen is flat.      Palpations: Abdomen is soft.   Skin:     General: Skin is warm and dry.   Neurological:      General: No focal deficit present.      Mental Status: He is alert and oriented to person, place, and time. Mental status is at baseline.   Psychiatric:         Mood and Affect: Mood normal.         Behavior: Behavior normal.         Thought Content: Thought  content normal.            Significant Labs:  All pertinent labs from the last 24 hours have been reviewed.    Significant Diagnostics:  CXR: I have reviewed all pertinent results/findings within the past 24 hours    VTE Risk Mitigation (From admission, onward)           Ordered     enoxaparin injection 40 mg  Daily         04/22/24 1551     IP VTE HIGH RISK PATIENT  Once         04/22/24 1551     Place LISETH hose  Until discontinued         04/22/24 0842     Place sequential compression device  Until discontinued         04/22/24 0842                  Assessment/Plan:     * Adenocarcinoma of right lung  69 y.o. male smoker s/p OLTX 2013 (HCC), hyperthyroidism, BPH, and hx substance abuse who is POD1 s/p robotic right upper lobectomy with MLND    - Will remove chest tube today. Keep 24F lowell drain to water seal.   - Daily CXR while CT in place  - Daily Tacro level and BMP  - Hepatology/Liver transplant consulted following surgery. Appreciate recommendations and assistance with management  - Multimodal pain management: PRNs adjusted  - OOB, ambulate TID in hallway  - IS use  - Regular diet    Dispo: possible home later this week, possibly Thursday vs Friday pending removal of other chest tube         Page Chan MD  Thoracic Surgery  [facility]
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
## 2024-04-24 NOTE — NURSING
Notified Pedrito with Rapid Response team of patient saturating dressing after having chest tube removed as well as tachycardia.  Rapid team are coming to assess the patient shortly.

## 2024-04-24 NOTE — CARE UPDATE
I have reviewed the chart of Mario Grier who is hospitalized for the following:    Active Hospital Problems    Diagnosis    *Adenocarcinoma of right lung    Hypokalemia     Monitor daily labs      Hyponatremia     Monitor daily labs      Liver fibrosis, transplanted liver     F3 on biopsy in 2016      Liver replaced by transplant     HCV stage 2 grade 1      Prophylactic immunotherapy    Hepatitis B virus infection in cadaveric donor     Donor HBcAb positive, HBsAg negative; post-transplant testing of donor serum positive for HBV DNA  Pt begun on Truvada POD#5          David Casillas PA-C  [facility]

## 2024-04-24 NOTE — NURSING
Anterior chest tube d'cd by Resident, gauze dressing and tegaderm applied, gauze completely saturated, per thoracic team normal, change dressing, will continue to monitor.

## 2024-04-24 NOTE — PLAN OF CARE
Problem: Physical Therapy  Goal: Physical Therapy Goal  Outcome: Met    PT evaluation complete and no goals established. Pt is functioning at high level and does not required acute care physical therapy services. Education provided to ambulate in hallway 3x/day with supervision in order to maintain strength and endurance. Pt verbalized understanding. Please re-consult if functional mobility changes. Anticipate d/c to home; no needs.     4/24/2024

## 2024-04-24 NOTE — NURSING
Pt jumped up to ambulate to the bathroom, HR jumped up to 132.  Pt denied straining to urinate,  while pt sitting on the toilet to urinate, Rapid Response team at the bedside to eval.  HR now in the 100's.  Pt stated he does feel his heart beating fast, denies SOB.  O2 on, sats 96 %.    Will continue to monitor.

## 2024-04-24 NOTE — PLAN OF CARE
[facility] Plan of Care Note     Dx:   Adenocarcinoma of right lung [C34.91]  NSCLC of right lung [C34.91]     Shift Events: Pt HR increased to 140s, sustained for about 7seconds. Dropped to 120s and fluctuated between 90s-120s. Pt complain of R sided chest pain near chest tube insertion site. Called on-call MD Darshana to report. MD at bedside to assess. No new orders placed. Pt HR now sustaining 90s-110s, unsymptomatic. BP still uncontrolled with PRNs. Possibly needs a cardiology consult.         Goals of Care: Ongoing and progressing.     Neuro: A/Ox4 with voice/touch stimulation     Vital Signs: Vital s stable. BP still elevated. PRNs administered.      Respiratory: 2L NC PRN     Diet: Diet Adult Regular (IDDSI Level 7)        Is patient tolerating current diet? Yes     GTTS: NA     Urine Output/Bowel Movement:   Urinal, LBM 4/21        Drains/Tubes/Tube Feeds (include total output/shift):   Chest tube to R (2) water seal         Lines: PIV L forearm        Accuchecks:NA     Skin: Tube insertion sites     Fall Risk Score: Moderate     Activity level? Assist x1/2     Any scheduled procedures? NA     Any safety concerns? Uncontrolled BP.    Problem: Adult Inpatient Plan of Care  Goal: Plan of Care Review  Outcome: Progressing  Goal: Patient-Specific Goal (Individualized)  Outcome: Progressing  Goal: Absence of Hospital-Acquired Illness or Injury  Outcome: Progressing  Goal: Optimal Comfort and Wellbeing  Outcome: Progressing  Goal: Readiness for Transition of Care  Outcome: Progressing     Problem: Fall Injury Risk  Goal: Absence of Fall and Fall-Related Injury  Outcome: Progressing     Problem: Skin Injury Risk Increased  Goal: Skin Health and Integrity  Outcome: Progressing     Problem: Wound  Goal: Optimal Coping  Outcome: Progressing  Goal: Optimal Functional Ability  Outcome: Progressing  Goal: Absence of Infection Signs and Symptoms  Outcome: Progressing  Goal: Improved Oral Intake  Outcome: Progressing  Goal:  Optimal Pain Control and Function  Outcome: Progressing  Goal: Skin Health and Integrity  Outcome: Progressing  Goal: Optimal Wound Healing  Outcome: Progressing

## 2024-04-24 NOTE — ASSESSMENT & PLAN NOTE
69 y.o. male smoker s/p OLTX 2013 (HCC), hyperthyroidism, BPH, and hx substance abuse who is POD1 s/p robotic right upper lobectomy with MLND    - Will remove chest tube today. Keep 24F lowell drain to water seal.   - Daily CXR while CT in place  - Daily Tacro level and BMP  - Hepatology/Liver transplant consulted following surgery. Appreciate recommendations and assistance with management  - Multimodal pain management: PRNs adjusted  - OOB, ambulate TID in hallway  - IS use  - Regular diet    Dispo: possible home later this week, possibly Thursday vs Friday pending removal of other chest tube

## 2024-04-25 PROBLEM — I48.92 ATRIAL FLUTTER: Status: ACTIVE | Noted: 2024-04-25

## 2024-04-25 LAB
ALBUMIN SERPL BCP-MCNC: 3.4 G/DL (ref 3.5–5.2)
ALP SERPL-CCNC: 91 U/L (ref 55–135)
ALP SERPL-CCNC: 91 U/L (ref 55–135)
ALP SERPL-CCNC: 94 U/L (ref 55–135)
ALT SERPL W/O P-5'-P-CCNC: 28 U/L (ref 10–44)
ALT SERPL W/O P-5'-P-CCNC: 28 U/L (ref 10–44)
ALT SERPL W/O P-5'-P-CCNC: 32 U/L (ref 10–44)
ANION GAP SERPL CALC-SCNC: 11 MMOL/L (ref 8–16)
ANION GAP SERPL CALC-SCNC: 9 MMOL/L (ref 8–16)
ANION GAP SERPL CALC-SCNC: 9 MMOL/L (ref 8–16)
AST SERPL-CCNC: 20 U/L (ref 10–40)
AST SERPL-CCNC: 20 U/L (ref 10–40)
AST SERPL-CCNC: 23 U/L (ref 10–40)
BILIRUB SERPL-MCNC: 1 MG/DL (ref 0.1–1)
BILIRUB SERPL-MCNC: 1.1 MG/DL (ref 0.1–1)
BILIRUB SERPL-MCNC: 1.1 MG/DL (ref 0.1–1)
BUN SERPL-MCNC: 34 MG/DL (ref 8–23)
BUN SERPL-MCNC: 35 MG/DL (ref 8–23)
BUN SERPL-MCNC: 35 MG/DL (ref 8–23)
CALCIUM SERPL-MCNC: 9.1 MG/DL (ref 8.7–10.5)
CALCIUM SERPL-MCNC: 9.2 MG/DL (ref 8.7–10.5)
CALCIUM SERPL-MCNC: 9.2 MG/DL (ref 8.7–10.5)
CHLORIDE SERPL-SCNC: 101 MMOL/L (ref 95–110)
CHLORIDE SERPL-SCNC: 102 MMOL/L (ref 95–110)
CHLORIDE SERPL-SCNC: 102 MMOL/L (ref 95–110)
CO2 SERPL-SCNC: 21 MMOL/L (ref 23–29)
CO2 SERPL-SCNC: 23 MMOL/L (ref 23–29)
CO2 SERPL-SCNC: 23 MMOL/L (ref 23–29)
CREAT SERPL-MCNC: 0.9 MG/DL (ref 0.5–1.4)
CREAT SERPL-MCNC: 1.1 MG/DL (ref 0.5–1.4)
CREAT SERPL-MCNC: 1.1 MG/DL (ref 0.5–1.4)
EST. GFR  (NO RACE VARIABLE): >60 ML/MIN/1.73 M^2
GLUCOSE SERPL-MCNC: 122 MG/DL (ref 70–110)
GLUCOSE SERPL-MCNC: 122 MG/DL (ref 70–110)
GLUCOSE SERPL-MCNC: 137 MG/DL (ref 70–110)
MAGNESIUM SERPL-MCNC: 2.2 MG/DL (ref 1.6–2.6)
OHS QRS DURATION: 64 MS
OHS QRS DURATION: 64 MS
OHS QRS DURATION: 72 MS
OHS QRS DURATION: 80 MS
OHS QRS DURATION: 98 MS
OHS QTC CALCULATION: 420 MS
OHS QTC CALCULATION: 425 MS
OHS QTC CALCULATION: 455 MS
OHS QTC CALCULATION: 464 MS
OHS QTC CALCULATION: 477 MS
POTASSIUM SERPL-SCNC: 3.6 MMOL/L (ref 3.5–5.1)
PROT SERPL-MCNC: 7.3 G/DL (ref 6–8.4)
SODIUM SERPL-SCNC: 133 MMOL/L (ref 136–145)
SODIUM SERPL-SCNC: 134 MMOL/L (ref 136–145)
SODIUM SERPL-SCNC: 134 MMOL/L (ref 136–145)
TACROLIMUS BLD-MCNC: 5.8 NG/ML (ref 5–15)

## 2024-04-25 PROCEDURE — 25000242 PHARM REV CODE 250 ALT 637 W/ HCPCS

## 2024-04-25 PROCEDURE — 20600001 HC STEP DOWN PRIVATE ROOM

## 2024-04-25 PROCEDURE — 27000221 HC OXYGEN, UP TO 24 HOURS

## 2024-04-25 PROCEDURE — 36415 COLL VENOUS BLD VENIPUNCTURE: CPT | Performed by: STUDENT IN AN ORGANIZED HEALTH CARE EDUCATION/TRAINING PROGRAM

## 2024-04-25 PROCEDURE — 25000003 PHARM REV CODE 250: Performed by: PHYSICIAN ASSISTANT

## 2024-04-25 PROCEDURE — 63600175 PHARM REV CODE 636 W HCPCS

## 2024-04-25 PROCEDURE — 93005 ELECTROCARDIOGRAM TRACING: CPT

## 2024-04-25 PROCEDURE — 93010 ELECTROCARDIOGRAM REPORT: CPT | Mod: 76,,, | Performed by: INTERNAL MEDICINE

## 2024-04-25 PROCEDURE — 99223 1ST HOSP IP/OBS HIGH 75: CPT | Mod: GC,,, | Performed by: STUDENT IN AN ORGANIZED HEALTH CARE EDUCATION/TRAINING PROGRAM

## 2024-04-25 PROCEDURE — 25000003 PHARM REV CODE 250

## 2024-04-25 PROCEDURE — 94761 N-INVAS EAR/PLS OXIMETRY MLT: CPT

## 2024-04-25 PROCEDURE — 63600175 PHARM REV CODE 636 W HCPCS: Performed by: STUDENT IN AN ORGANIZED HEALTH CARE EDUCATION/TRAINING PROGRAM

## 2024-04-25 PROCEDURE — 80053 COMPREHEN METABOLIC PANEL: CPT | Performed by: STUDENT IN AN ORGANIZED HEALTH CARE EDUCATION/TRAINING PROGRAM

## 2024-04-25 PROCEDURE — 94640 AIRWAY INHALATION TREATMENT: CPT

## 2024-04-25 PROCEDURE — 25000003 PHARM REV CODE 250: Performed by: STUDENT IN AN ORGANIZED HEALTH CARE EDUCATION/TRAINING PROGRAM

## 2024-04-25 PROCEDURE — 99900035 HC TECH TIME PER 15 MIN (STAT)

## 2024-04-25 PROCEDURE — 80197 ASSAY OF TACROLIMUS: CPT | Performed by: STUDENT IN AN ORGANIZED HEALTH CARE EDUCATION/TRAINING PROGRAM

## 2024-04-25 PROCEDURE — 94799 UNLISTED PULMONARY SVC/PX: CPT

## 2024-04-25 RX ORDER — MAGNESIUM SULFATE HEPTAHYDRATE 40 MG/ML
2 INJECTION, SOLUTION INTRAVENOUS ONCE
Status: COMPLETED | OUTPATIENT
Start: 2024-04-25 | End: 2024-04-25

## 2024-04-25 RX ORDER — POTASSIUM CHLORIDE 20 MEQ/1
40 TABLET, EXTENDED RELEASE ORAL ONCE
Status: COMPLETED | OUTPATIENT
Start: 2024-04-25 | End: 2024-04-25

## 2024-04-25 RX ORDER — METOPROLOL TARTRATE 1 MG/ML
INJECTION, SOLUTION INTRAVENOUS
Status: COMPLETED
Start: 2024-04-25 | End: 2024-04-25

## 2024-04-25 RX ORDER — METOPROLOL TARTRATE 25 MG/1
12.5 TABLET ORAL 3 TIMES DAILY
Status: DISCONTINUED | OUTPATIENT
Start: 2024-04-25 | End: 2024-04-26 | Stop reason: HOSPADM

## 2024-04-25 RX ORDER — METOPROLOL TARTRATE 25 MG/1
25 TABLET, FILM COATED ORAL 3 TIMES DAILY
Status: DISCONTINUED | OUTPATIENT
Start: 2024-04-25 | End: 2024-04-25

## 2024-04-25 RX ORDER — METOPROLOL TARTRATE 1 MG/ML
5 INJECTION, SOLUTION INTRAVENOUS EVERY 5 MIN PRN
Status: COMPLETED | OUTPATIENT
Start: 2024-04-25 | End: 2024-04-25

## 2024-04-25 RX ADMIN — GABAPENTIN 300 MG: 300 CAPSULE ORAL at 08:04

## 2024-04-25 RX ADMIN — FAMOTIDINE 20 MG: 20 TABLET ORAL at 08:04

## 2024-04-25 RX ADMIN — METOPROLOL TARTRATE 12.5 MG: 25 TABLET, FILM COATED ORAL at 08:04

## 2024-04-25 RX ADMIN — OXYCODONE HYDROCHLORIDE 15 MG: 10 TABLET ORAL at 04:04

## 2024-04-25 RX ADMIN — TACROLIMUS 1 MG: 1 CAPSULE ORAL at 05:04

## 2024-04-25 RX ADMIN — METOPROLOL TARTRATE 5 MG: 5 INJECTION, SOLUTION INTRAVENOUS at 09:04

## 2024-04-25 RX ADMIN — ENOXAPARIN SODIUM 40 MG: 40 INJECTION SUBCUTANEOUS at 04:04

## 2024-04-25 RX ADMIN — ACETAMINOPHEN 1000 MG: 500 TABLET ORAL at 06:04

## 2024-04-25 RX ADMIN — IPRATROPIUM BROMIDE AND ALBUTEROL SULFATE 3 ML: 2.5; .5 SOLUTION RESPIRATORY (INHALATION) at 08:04

## 2024-04-25 RX ADMIN — METOPROLOL TARTRATE 5 MG: 5 INJECTION, SOLUTION INTRAVENOUS at 10:04

## 2024-04-25 RX ADMIN — METHOCARBAMOL 750 MG: 750 TABLET ORAL at 04:04

## 2024-04-25 RX ADMIN — POTASSIUM CHLORIDE 40 MEQ: 1500 TABLET, EXTENDED RELEASE ORAL at 08:04

## 2024-04-25 RX ADMIN — DOCUSATE SODIUM AND SENNOSIDES 1 TABLET: 8.6; 5 TABLET, FILM COATED ORAL at 08:04

## 2024-04-25 RX ADMIN — METOROPROLOL TARTRATE 5 MG: 5 INJECTION, SOLUTION INTRAVENOUS at 10:04

## 2024-04-25 RX ADMIN — TAMSULOSIN HYDROCHLORIDE 0.4 MG: 0.4 CAPSULE ORAL at 08:04

## 2024-04-25 RX ADMIN — LIDOCAINE 5% 1 PATCH: 700 PATCH TOPICAL at 01:04

## 2024-04-25 RX ADMIN — METHOCARBAMOL 750 MG: 750 TABLET ORAL at 08:04

## 2024-04-25 RX ADMIN — POTASSIUM CHLORIDE 40 MEQ: 1500 TABLET, EXTENDED RELEASE ORAL at 06:04

## 2024-04-25 RX ADMIN — TACROLIMUS 1 MG: 1 CAPSULE ORAL at 08:04

## 2024-04-25 RX ADMIN — METOPROLOL TARTRATE 12.5 MG: 25 TABLET, FILM COATED ORAL at 03:04

## 2024-04-25 RX ADMIN — GABAPENTIN 300 MG: 300 CAPSULE ORAL at 03:04

## 2024-04-25 RX ADMIN — MAGNESIUM SULFATE 2 G: 2 INJECTION INTRAVENOUS at 09:04

## 2024-04-25 RX ADMIN — ACETAMINOPHEN 1000 MG: 500 TABLET ORAL at 01:04

## 2024-04-25 RX ADMIN — METHOCARBAMOL 750 MG: 750 TABLET ORAL at 01:04

## 2024-04-25 RX ADMIN — OXYCODONE HYDROCHLORIDE 15 MG: 10 TABLET ORAL at 10:04

## 2024-04-25 RX ADMIN — POLYETHYLENE GLYCOL 3350 17 G: 17 POWDER, FOR SOLUTION ORAL at 08:04

## 2024-04-25 NOTE — PROGRESS NOTES
[facility]  Thoracic Surgery  Progress Note    Subjective:     History of Present Illness:  Patient is a 69 y.o. male smoker s/p OLTX 2013 (HCC), hyperthyroidism, BPH, and hx substance abuse here today for RUL NSCLC. Yearly CAP CT per transplant showed right upper lobe cavitary lesion. Referred to pulmonary. PET January 2024 showed RUL lesion with SUV 6.7 without mediastinal or hilar avidity. Percutaneous biopsy positive for adenocarcinoma. Completed PFTs. Comes today with updated chest CT. Reports weight loss and poor appetite.      Current smoker. ~ 50 pack years.   Liver transplant, appendectomy, orbital fracture (metal)     Post-Op Info:  Procedure(s) (LRB):  XI ROBOTIC RIGHT UPPER  LOBECTOMY,LUNG (Right)  LYMPHADENECTOMY (Right)  BLOCK, NERVE, INTERCOSTAL, 2 OR MORE (Right)   3 Days Post-Op     Interval History:   Episode of SVT overnight. Blood pressure remained stable. Given adenosine x2 and metop x1. Converted to NSR.   In NSR this AM. Denies any chest pain or SOB.   Pain control better this AM.   Chest tube removed yesterday.  24F lowell drain with only 5cc serosanguinous output. Small air leak with cough.     Medications:  Continuous Infusions:  Current Facility-Administered Medications   Medication Dose Route Frequency Last Rate Last Admin     Scheduled Meds:  Current Facility-Administered Medications   Medication Dose Route Frequency    acetaminophen  1,000 mg Oral Q8H    albuterol-ipratropium  3 mL Nebulization Q6H WAKE    enoxparin  40 mg Subcutaneous Daily    famotidine  20 mg Oral BID    gabapentin  300 mg Oral TID    LIDOcaine  1 patch Transdermal Q24H    magnesium sulfate IVPB  2 g Intravenous Once    methocarbamoL  750 mg Oral QID    polyethylene glycol  17 g Oral Daily    senna-docusate 8.6-50 mg  1 tablet Oral Daily    tacrolimus  1 mg Oral BID    tamsulosin  0.4 mg Oral QHS     PRN Meds:  Current Facility-Administered Medications:     bisacodyL, 10 mg, Rectal, Daily PRN    hydrALAZINE, 10  mg, Intravenous, Q6H PRN    labetalol, 10 mg, Intravenous, Q6H PRN    metoclopramide, 5 mg, Intravenous, Q6H PRN    ondansetron, 8 mg, Oral, Q8H PRN    oxyCODONE, 15 mg, Oral, Q6H PRN     Review of patient's allergies indicates:   Allergen Reactions    Codeine Hives and Itching    Penicillins     Ciprofloxacin Rash     Objective:     Vital Signs (Most Recent):  Temp: 98.3 °F (36.8 °C) (04/25/24 0738)  Pulse: (!) 155 (04/25/24 0859)  Resp: 16 (04/25/24 0808)  BP: (!) 158/96 (04/25/24 0738)  SpO2: (!) 92 % (04/25/24 0808) Vital Signs (24h Range):  Temp:  [97.8 °F (36.6 °C)-98.7 °F (37.1 °C)] 98.3 °F (36.8 °C)  Pulse:  [] 155  Resp:  [16-25] 16  SpO2:  [92 %-97 %] 92 %  BP: ()/(56-99) 158/96     Intake/Output - Last 3 Shifts         04/23 0700  04/24 0659 04/24 0700 04/25 0659 04/25 0700  04/26 0659    P.O.  480     IV Piggyback       Total Intake(mL/kg)  480 (7.8)     Urine (mL/kg/hr) 0 (0) 300 (0.2)     Emesis/NG output       Other       Stool  0     Chest Tube 141 10     Total Output 141 310     Net -141 +170            Urine Occurrence 5 x 2 x     Stool Occurrence  1 x             SpO2: (!) 92 %        Physical Exam  Constitutional:       Appearance: Normal appearance. He is normal weight.   Eyes:      Extraocular Movements: Extraocular movements intact.   Cardiovascular:      Rate and Rhythm: Normal rate and regular rhythm.      Pulses: Normal pulses.   Pulmonary:      Effort: Pulmonary effort is normal.      Breath sounds: Normal breath sounds.      Comments: 24 f lowell drain to water seal. Serosanguinous output.   Abdominal:      General: Abdomen is flat.      Palpations: Abdomen is soft.   Skin:     General: Skin is warm and dry.   Neurological:      General: No focal deficit present.      Mental Status: He is alert and oriented to person, place, and time. Mental status is at baseline.   Psychiatric:         Mood and Affect: Mood normal.         Behavior: Behavior normal.         Thought Content:  Thought content normal.            Significant Labs:  All pertinent labs from the last 24 hours have been reviewed.    Significant Diagnostics:  CXR: I have reviewed all pertinent results/findings within the past 24 hours    VTE Risk Mitigation (From admission, onward)           Ordered     enoxaparin injection 40 mg  Daily         04/22/24 1551     IP VTE HIGH RISK PATIENT  Once         04/22/24 1551     Place LISETH hose  Until discontinued         04/22/24 0842     Place sequential compression device  Until discontinued         04/22/24 0842                  Assessment/Plan:     * Adenocarcinoma of right lung  69 y.o. male smoker s/p OLTX 2013 (HCC), hyperthyroidism, BPH, and hx substance abuse who is POD1 s/p robotic right upper lobectomy with MLND    - 24F lowell drain clamped this AM. Repeat CXR 4 hours after clamped. Possibly remove this afternoon.    - Daily CXR while CT in place  - Cardiology consulted overnight for SVT/AF. Now in NSR. Does not need anticoagulation/antiarrhythmics since <24 hours and single episode.  - Daily Tacro level and BMP  - Hepatology/Liver transplant consulted following surgery. Appreciate recommendations and assistance with management  - Multimodal pain management: PRNs adjusted  - OOB, ambulate TID in hallway  - IS use  - Regular diet    Dispo: likely discharge tomorrow         Page Chan MD  Thoracic Surgery  [facility]

## 2024-04-25 NOTE — CODE/ RAPID DOCUMENTATION
RAPID RESPONSE NURSE NOTE        Admit Date: 2024  LOS: 3  Code Status: Full Code   Date of Consult: 2024  : 1955  Age: 69 y.o.  Weight:   Wt Readings from Last 1 Encounters:   24 61.7 kg (136 lb)     Sex: male  Race: White   Bed: [address] [place] [address] A:   MRN: [id number]  Time Rapid Response Team page Received: 0945  Time Rapid Response Team at Bedside: 0952  Time Rapid Response Team left Bedside: 1013  Was the patient discharged from an ICU this admission? No   Was the patient discharged from a PACU within last 24 hours? No   Did the patient receive conscious sedation/general anesthesia in last 24 hours? No  Was the patient in the ED within the past 24 hours? No  Was the patient on NIPPV within the past 24 hours? No   Did this progress into an ARC or CPA: No  Attending Physician: Aki Hernadez MD  Primary Service: Thoracic Diseases       SITUATION    Notified by charge RN via phone call.  Reason for alert: Tachycardia  Called to evaluate the patient for Dysrythmia    BACKGROUND     Why is the patient in the hospital?: Adenocarcinoma of right lung    Patient has a past medical history of Anxiety, Appendicitis, Cancer, Graves disease, Hepatitis C, History of psychiatric care, History of psychiatric hospitalization, History of psychiatric hospitalization, History of psychiatric hospitalization, History of reduction of orbital fracture, Hyperthyroidism, Liver fibrosis, transplanted liver, Liver mass, right lobe, Localized osteoporosis without current pathological fracture, Osteopenia of multiple sites, Pre-operative cardiovascular examination, Substance abuse, Therapy, and Thyroid disease.    Last Vitals:  Temp: 98.1 °F (36.7 °C) ( 1128)  Pulse: 73 ( 1128)  Resp: 20 ( 1128)  BP: 156/95 ( 1128)  SpO2: 95 % ( 1128)    24 Hours Vitals Range:  Temp:  [97.8 °F (36.6 °C)-98.7 °F (37.1 °C)]   Pulse:  []   Resp:  [16-25]   BP: ()/(56-99)   SpO2:  [92 %-99 %]  "    Labs:  Recent Labs     04/22/24  2314   WBC 14.25*   HGB 14.0   HCT 41.4          Recent Labs     04/22/24  2314 04/23/24  0305 04/24/24  0533 04/24/24  2327 04/25/24  0531   *   < > 133*  133* 133* 134*  134*   K 3.8   < > 3.4*  3.4* 3.6 3.6  3.6      < > 99  99 101 102  102   CO2 21*   < > 21*  21* 21* 23  23   BUN 16   < > 27*  27* 34* 35*  35*   CREATININE 1.1   < > 0.9  0.9 0.9 1.1  1.1   *   < > 134*  134* 137* 122*  122*   MG 1.6  --   --  2.2  --     < > = values in this interval not displayed.        No results for input(s): "PH", "PCO2", "PO2", "HCO3", "POCSATURATED", "BE" in the last 72 hours.     ASSESSMENT    Called to bedside for pt with tachycardia to 160s, bp 80s/60s, pt c/o chest tightness. ZANE Portillo at bedside. EKG obtained, IV metoprolol given x3 with improvement in rate, bp stabilizing to 120s/90s, pt states chest tightness improved.     Physical Exam  HENT:      Mouth/Throat:      Mouth: Mucous membranes are moist.      Pharynx: Oropharynx is clear.   Eyes:      Pupils: Pupils are equal, round, and reactive to light.   Cardiovascular:      Rate and Rhythm: Regular rhythm. Tachycardia present.      Comments: Chest tightness present  Pulmonary:      Effort: Pulmonary effort is normal.   Abdominal:      General: Abdomen is flat.      Palpations: Abdomen is soft.   Skin:     General: Skin is warm and dry.      Capillary Refill: Capillary refill takes less than 2 seconds.   Neurological:      General: No focal deficit present.      Mental Status: He is alert and oriented to person, place, and time.      GCS: GCS eye subscore is 4. GCS verbal subscore is 5. GCS motor subscore is 6.         INTERVENTIONS    The patient was seen for Cardiac problem. Staff concerns included tachycardia. The following interventions were performed: continuous cardiac monitoring continued and IVP metoprolol given.    RECOMMENDATIONS    We recommend: Monitor HR/BP per protocol " post IV beta blocker administration. F/u CXR results. Maintain tele/IV access.     PROVIDER ESCALATION    Orders received and case discussed with  ZANE Portillo.    Primary team arrival time: At bedside    Disposition: Remain in room 1004.    FOLLOW UP    Bedside RN, Roslyn  updated on plan of care. Instructed to call the Rapid Response Nurse, Roahn Pavon RN at [phone] for additional questions or concerns.

## 2024-04-25 NOTE — ASSESSMENT & PLAN NOTE
69 years old s/p OLTX 2013 (HCC), hyperthyroidism, BPH, and hx substance abuse now POD 2 s/p RUL NSCLC.    EP consulted for tachycardia.     Reviewing EKGs from initial episodes this PM, then after adenosine, concerns for AT vs atypical flutter.     - Discussed IV metoprolol push 5 mg once then starting lopressor 25 TID.  now.   - Maintain K > 4, Mag > 2 and Ca/iCal WNL to decrease arrhythmogenic potential  - Rate control with Metoprolol Tartrate 25 mg TID   - Anticoagulation with Heparin gtt if safe per primary team    --> [id number] score 2    - Maintain on telemetry and daily morning EKGs for the next few days  - Continue workup for infection and current management per primary

## 2024-04-25 NOTE — NURSING
"Called received from tele at 2243 pt hr sustaining in 160s. Entered pt room. Vitals taken. -160s. Called on-call Amandeep Thornton MD to report. EKG order placed at that time. Pt in SVT at time  of EKG. Called MD Hillary to report. Charge nurse GAEL Robb at bedside. Called Rapid response.Rapid at bedside at 2305. Hillary at bedside. Vagal maneuver attempted, unsuccessful. Adenosine 6mg administered per rapid team at 2321. First round unsuccessful. Adenosine 12mg administered per rapid team at 2326. Pt HR 90s-110. 15min later pt HR elevated to 150s again. MD Hillary and rapid team back at bedside. MD Hillary called Cardiology for consult. One time dose of metoprolol given per Rapid team. EKG order placed post administration. HR now NSR 89bpm. Pt states "I feel much better and I am breathing better. "   "

## 2024-04-25 NOTE — CODE/ RAPID DOCUMENTATION
RAPID RESPONSE NURSE NOTE        Admit Date: 2024  LOS: 3  Code Status: Full Code   Date of Consult: 2024  : 1955  Age: 69 y.o.  Weight:   Wt Readings from Last 1 Encounters:   24 61.7 kg (136 lb)     Sex: male  Race: White   Bed: [address] [place] [address] A:   MRN: [id number]  Time Rapid Response Team page Received: 22:38  Time Rapid Response Team at Bedside: 22:42  Time Rapid Response Team left Bedside: 2334  Was the patient discharged from an ICU this admission? No   Was the patient discharged from a PACU within last 24 hours? No   Did the patient receive conscious sedation/general anesthesia in last 24 hours? No  Was the patient in the ED within the past 24 hours? No  Was the patient on NIPPV within the past 24 hours? No   Did this progress into an ARC or CPA: No  Attending Physician: Aki Hernadez MD  Primary Service: Thoracic Diseases       SITUATION    Notified by charge RN via phone call.  Reason for alert: Tachy arrhthymias   Called to evaluate the patient for Dysrythmia    BACKGROUND     Why is the patient in the hospital?: Adenocarcinoma of right lung    Patient has a past medical history of Anxiety, Appendicitis, Cancer, Graves disease, Hepatitis C, History of psychiatric care, History of psychiatric hospitalization, History of psychiatric hospitalization, History of psychiatric hospitalization, History of reduction of orbital fracture, Hyperthyroidism, Liver fibrosis, transplanted liver, Liver mass, right lobe, Localized osteoporosis without current pathological fracture, Osteopenia of multiple sites, Pre-operative cardiovascular examination, Substance abuse, Therapy, and Thyroid disease.    Last Vitals:  Temp: 98.1 °F (36.7 °C) (0)  Pulse: 89 ( 0020)  Resp: 20 ( 0020)  BP: 126/81 ( 0020)  SpO2: 97 % (0)    24 Hours Vitals Range:  Temp:  [97.4 °F (36.3 °C)-98.7 °F (37.1 °C)]   Pulse:  []   Resp:  [18-25]   BP: ()/()   SpO2:  [93 %-97 %]  "    Labs:  Recent Labs     04/22/24  2314   WBC 14.25*   HGB 14.0   HCT 41.4          Recent Labs     04/22/24  2314 04/23/24  0305 04/24/24  0533 04/24/24  2327   * 135* 133*  133* 133*   K 3.8 4.4 3.4*  3.4* 3.6    102 99  99 101   CO2 21* 19* 21*  21* 21*   BUN 16 16 27*  27* 34*   CREATININE 1.1 1.0 0.9  0.9 0.9   * 149* 134*  134* 137*   MG 1.6  --   --  2.2        No results for input(s): "PH", "PCO2", "PO2", "HCO3", "POCSATURATED", "BE" in the last 72 hours.     ASSESSMENT    Physical Exam  Cardiovascular:      Rate and Rhythm: Tachycardia present.      Pulses:           Radial pulses are 3+ on the right side and 3+ on the left side.        Dorsalis pedis pulses are 2+ on the right side and 2+ on the left side.   Abdominal:      General: Abdomen is flat.   Skin:     General: Skin is warm.   Neurological:      Mental Status: He is alert.      GCS: GCS eye subscore is 4. GCS verbal subscore is 5. GCS motor subscore is 6.     HR: 152  BP: 113/74   RR: 22  Spo2: 93%     Called by charge nurse Cezar, for tachycardia. Pt alert and oriented, HR sustaining in 150s-160s; BP stable (113/74). Pt. denied chest pain or SOB.     INTERVENTIONS    The patient was seen for Cardiac problem. Staff concerns included tachycardia and  . The following interventions were performed: CMP, Magnesium, EKG, continuous cardiac monitoring continued, cardiology consult, and 12 Lead ECG, CMP, Adenosine IVP x2, Metoprolol IVP x1.     Jessica RN, Cezar MAYO and RRN x2 @ bedside; pt attempted vagal maneuver multiple times with no change in HR. MD Hillary called to bedside to further assess patient; Adenosine IVP ordered. Prior to adenosine admin, dfib pads applied to patient, IV access assessed/ established, cardiac monitoring continued. 6mg Adenosine administered by RRN. 12 lead ECG repeated; pt quickly converted back into SVT. Second dose of Adenosine order by MD Hillary. 12mg Adenosine administered by RRN. 12 Lead ECG " repeated; remains tachycardiac. Cardiology fellow called to bedside; Metoprolol 5mg IVP ordered and given by RRN. Rhythm initially converted io A-Flutter. 12 Lead ECG post admin shows normal sinus rhythm w/ HR 84.     RECOMMENDATIONS    We recommend: Continue cardiac monitoring; monitor electrolytes; scheduled PO metoprolol starting 4/25am.     PROVIDER ESCALATION    Orders received and case discussed with Dr. Martel, Dr. Phillips, .    Primary team arrival time: 2300    Disposition: Remain in room 1004.    FOLLOW UP    Bedside RNJessica  updated on plan of care. Instructed to call the Rapid Response Nurse, Zuly Cervantes[place] at [phone] for additional questions or concerns.

## 2024-04-25 NOTE — SUBJECTIVE & OBJECTIVE
Interval History:   Episode of SVT overnight. Blood pressure remained stable. Given adenosine x2 and metop x1. Converted to NSR.   In NSR this AM. Denies any chest pain or SOB.   Pain control better this AM.   Chest tube removed yesterday.  24F lowell drain with only 5cc serosanguinous output. Small air leak with cough.     Medications:  Continuous Infusions:  Current Facility-Administered Medications   Medication Dose Route Frequency Last Rate Last Admin     Scheduled Meds:  Current Facility-Administered Medications   Medication Dose Route Frequency    acetaminophen  1,000 mg Oral Q8H    albuterol-ipratropium  3 mL Nebulization Q6H WAKE    enoxparin  40 mg Subcutaneous Daily    famotidine  20 mg Oral BID    gabapentin  300 mg Oral TID    LIDOcaine  1 patch Transdermal Q24H    magnesium sulfate IVPB  2 g Intravenous Once    methocarbamoL  750 mg Oral QID    polyethylene glycol  17 g Oral Daily    senna-docusate 8.6-50 mg  1 tablet Oral Daily    tacrolimus  1 mg Oral BID    tamsulosin  0.4 mg Oral QHS     PRN Meds:  Current Facility-Administered Medications:     bisacodyL, 10 mg, Rectal, Daily PRN    hydrALAZINE, 10 mg, Intravenous, Q6H PRN    labetalol, 10 mg, Intravenous, Q6H PRN    metoclopramide, 5 mg, Intravenous, Q6H PRN    ondansetron, 8 mg, Oral, Q8H PRN    oxyCODONE, 15 mg, Oral, Q6H PRN     Review of patient's allergies indicates:   Allergen Reactions    Codeine Hives and Itching    Penicillins     Ciprofloxacin Rash     Objective:     Vital Signs (Most Recent):  Temp: 98.3 °F (36.8 °C) (04/25/24 0738)  Pulse: (!) 155 (04/25/24 0859)  Resp: 16 (04/25/24 0808)  BP: (!) 158/96 (04/25/24 0738)  SpO2: (!) 92 % (04/25/24 0808) Vital Signs (24h Range):  Temp:  [97.8 °F (36.6 °C)-98.7 °F (37.1 °C)] 98.3 °F (36.8 °C)  Pulse:  [] 155  Resp:  [16-25] 16  SpO2:  [92 %-97 %] 92 %  BP: ()/(56-99) 158/96     Intake/Output - Last 3 Shifts         04/23 0700 04/24 0659 04/24 0700 04/25 0659 04/25 0700 04/26  0659    P.O.  480     IV Piggyback       Total Intake(mL/kg)  480 (7.8)     Urine (mL/kg/hr) 0 (0) 300 (0.2)     Emesis/NG output       Other       Stool  0     Chest Tube 141 10     Total Output 141 310     Net -141 +170            Urine Occurrence 5 x 2 x     Stool Occurrence  1 x             SpO2: (!) 92 %        Physical Exam  Constitutional:       Appearance: Normal appearance. He is normal weight.   Eyes:      Extraocular Movements: Extraocular movements intact.   Cardiovascular:      Rate and Rhythm: Normal rate and regular rhythm.      Pulses: Normal pulses.   Pulmonary:      Effort: Pulmonary effort is normal.      Breath sounds: Normal breath sounds.      Comments: 24 f lowell drain to water seal. Serosanguinous output.   Abdominal:      General: Abdomen is flat.      Palpations: Abdomen is soft.   Skin:     General: Skin is warm and dry.   Neurological:      General: No focal deficit present.      Mental Status: He is alert and oriented to person, place, and time. Mental status is at baseline.   Psychiatric:         Mood and Affect: Mood normal.         Behavior: Behavior normal.         Thought Content: Thought content normal.            Significant Labs:  All pertinent labs from the last 24 hours have been reviewed.    Significant Diagnostics:  CXR: I have reviewed all pertinent results/findings within the past 24 hours    VTE Risk Mitigation (From admission, onward)           Ordered     enoxaparin injection 40 mg  Daily         04/22/24 1551     IP VTE HIGH RISK PATIENT  Once         04/22/24 1551     Place LISETH hose  Until discontinued         04/22/24 0842     Place sequential compression device  Until discontinued         04/22/24 0842
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
## 2024-04-25 NOTE — PLAN OF CARE
[facility] Plan of Care Note     Dx: Adenocarcinoma of right lung [     Shift Events: episode of SVT controlled with IV metroprolol (see previous notes), pt up to ambulate in room but stated too tired to ambulate in hallway or sit in chair, pain moderately controlled with PRN medication ATC, Magnesium and Potassium replaced, clamping trail of chest tube unsuccessful     Goals of Care: progressive mobility, hemodynamic stability, pain management,      Neuro: A/Ox4      Vital Signs: Vitals stable post SVT episode     Respiratory: room air     Diet: regular     Is patient tolerating current diet? Yes, little appetite     GTTS: magnesium replacement    Urine Output/Bowel Movement:   Adequate UO, LBM 4/24     Drains/Tubes/Tube Feeds (include total output/shift): Chest tube to R (1) water seal w/ 45 ml serosanguinous drainage; clamping trial stopped due to pt's complaint of SOB during SVT episode, MD aware     Lines: 20g R forearm, 20g R hand    Accuchecks: n/a     Skin: R chest lap sites w/gauze, 2nd R chest tube removal site w/gauze     Fall Risk Score: 14     Activity level? Standby assist     Any scheduled procedures? NA     Any safety concerns? Telemetry box, unfamiliar environment, chest tube     Problem: Adult Inpatient Plan of Care  Goal: Plan of Care Review  Outcome: Progressing  Goal: Patient-Specific Goal (Individualized)  Outcome: Progressing  Goal: Absence of Hospital-Acquired Illness or Injury  Outcome: Progressing  Goal: Optimal Comfort and Wellbeing  Outcome: Progressing     Problem: Fall Injury Risk  Goal: Absence of Fall and Fall-Related Injury  Outcome: Progressing     Problem: Skin Injury Risk Increased  Goal: Skin Health and Integrity  Outcome: Progressing

## 2024-04-25 NOTE — PLAN OF CARE
"[facility] Plan of Care Note     Dx:   Adenocarcinoma of right lung [C34.91]  NSCLC of right lung [C34.91]     Shift Events: Called received from tele at 2243 pt hr sustaining in 160s. Entered pt room. Vitals taken. -160s. Called on-call Amandeep Thornton MD to report. EKG order placed at that time. Pt in SVT at time  of EKG. Called MD Hillary to report. Charge nurse GAEL Robb at bedside. Called Rapid response.Rapid at bedside at 2305. Hillary at bedside. Vagal maneuver attempted, unsuccessful. Adenosine 6mg administered per rapid team at 2321. First round unsuccessful. Adenosine 12mg administered per rapid team at 2326. Pt HR 90s-110. 15min later pt HR elevated to 150s again. MD Hillary and rapid team back at bedside. MD Hillary called Cardiology for consult. One time dose of metoprolol given per Rapid team. EKG order placed post administration. HR now NSR 89bpm. Pt states "I feel much better and I am breathing better. "       Goals of Care: Ongoing and progressing.     Neuro: A/Ox4      Vital Signs: Vitals stable. BP still elevated. PRNs administered.      Respiratory: 2L NC PRN     Diet: NPO        Is patient tolerating current diet? NA     GTTS: NA     Urine Output/Bowel Movement:   Urinal, LBM 4/21        Drains/Tubes/Tube Feeds (include total output/shift):   Chest tube to R (1) water seal         Lines: 20g R forearm/ R hand        Accuchecks:NA     Skin: Tube insertion sites     Fall Risk Score: Moderate     Activity level? Assist x1/2     Any scheduled procedures? NA     Any safety concerns? Uncontrolled BP.     Problem: Adult Inpatient Plan of Care  Goal: Plan of Care Review  Outcome: Progressing  Goal: Patient-Specific Goal (Individualized)  Outcome: Progressing  Goal: Absence of Hospital-Acquired Illness or Injury  Outcome: Progressing  Goal: Optimal Comfort and Wellbeing  Outcome: Progressing  Goal: Readiness for Transition of Care  Outcome: Progressing     Problem: Fall Injury Risk  Goal: Absence of Fall and " Fall-Related Injury  Outcome: Progressing     Problem: Skin Injury Risk Increased  Goal: Skin Health and Integrity  Outcome: Progressing     Problem: Wound  Goal: Optimal Coping  Outcome: Progressing  Goal: Optimal Functional Ability  Outcome: Progressing  Goal: Absence of Infection Signs and Symptoms  Outcome: Progressing  Goal: Improved Oral Intake  Outcome: Progressing  Goal: Optimal Pain Control and Function  Outcome: Progressing  Goal: Skin Health and Integrity  Outcome: Progressing  Goal: Optimal Wound Healing  Outcome: Progressing

## 2024-04-25 NOTE — HPI
EP consulted for tachycardia.   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   Patient is POD 2 and has been recovering gradually with chest tubes pulled this AM ( 4/24/24). However around 11 PM he developed tachycardia to 150's but otherwise was hemodynamically stable. Primary team pushed adenosine 6 mg then 12 mg each with temporary improvement in HR. Team contacted EP afterward for further management.     Patient denies angina, MIXON, shortness of breath, palpitations, presyncope, syncope, leg swelling, PND, or orthopnea.        Stress echo 4/15/24:   Left Ventricle: Normal systolic function with EF 55 - 60%. There is normal diastolic function.  Right Ventricle: Right ventricle wall motion  is normal. Systolic function is normal.  The study is negative with no echocardiographic evidence of stress induced ischemia.

## 2024-04-25 NOTE — ASSESSMENT & PLAN NOTE
69 y.o. male smoker s/p OLTX 2013 (HCC), hyperthyroidism, BPH, and hx substance abuse who is POD1 s/p robotic right upper lobectomy with MLND    - 24F lowell drain clamped this AM. Repeat CXR 4 hours after clamped. Possibly remove this afternoon.    - Daily CXR while CT in place  - Cardiology consulted overnight for SVT/AF. Now in NSR. Does not need anticoagulation/antiarrhythmics since <24 hours and single episode.  - Daily Tacro level and BMP  - Hepatology/Liver transplant consulted following surgery. Appreciate recommendations and assistance with management  - Multimodal pain management: PRNs adjusted  - OOB, ambulate TID in hallway  - IS use  - Regular diet    Dispo: likely discharge tomorrow
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
## 2024-04-25 NOTE — PROGRESS NOTES
RN notified by tele that pt in SVT in 160s. RN assessed bedside to find pt sitting on side of bed. Pt complained of shortness of breath and discomfort. BP 87/62, . Rapid response and primary team notified. Pt repositioned to lie on bed and chest tube unclamped. Vagal maneuvers attempted with no result. After repositioning, /70, .   EKG and chest x ray ordered.   20 mg of metoprolol given as 5 mg every 5 min administered. /94, HR 70.   Metoprolol 12.5 mg ordered TID.

## 2024-04-25 NOTE — CARE UPDATE
Called to bedside for tachycardia shown on telemetry, EKG confirmed SVT HR in the 150s. Patient reports palpitations. Carotid massage and vagal maneuvers failed to improve his tachycardia. He was given 6 mg adenosine which briefly converted him to sinus tachycardia before reverting back to SVT. A 12 mg dose was subsequently given and he converted to sinus tachycardia. After 5-10 minutes he went back to tachycardia to the 150s. Cardiology was called. 1x 5 mg IV metoprolol push was given. Patient's BP was stable at 107/73, HR down to 70-80s. Of note, atrial flutter was seen on EKG after the 2nd dose of adenosine. 25 mg lopressor TID started and EP consult placed for atrial flutter. Cardiology recommends anticoagulation with heparin gtt. Will discuss with staff. Electrolyte labs sent within normal limits. Will continue to monitor.      Amandeep Thornton MD  [facility]

## 2024-04-25 NOTE — CARE UPDATE
EP Care Update note    Patient converted from Atrial flutter to sinus tachycardia around 00:10    Patient episode was in the setting of post op and lasted less than 24 hours. No need for anticoagulation or antiarrhythmias.      Discuss with Dr. Ledbetter. Please see attestation in consult note from staff.      Lilibeth PRIETO MD  Cardiology Fellow  Pager: [phone]  [facility]

## 2024-04-26 ENCOUNTER — TELEPHONE (OUTPATIENT)
Dept: TRANSPLANT | Facility: CLINIC | Age: 69
End: 2024-04-26
Payer: MEDICARE

## 2024-04-26 ENCOUNTER — PATIENT MESSAGE (OUTPATIENT)
Dept: TRANSPLANT | Facility: CLINIC | Age: 69
End: 2024-04-26
Payer: MEDICARE

## 2024-04-26 ENCOUNTER — PATIENT MESSAGE (OUTPATIENT)
Dept: CARDIOTHORACIC SURGERY | Facility: HOSPITAL | Age: 69
End: 2024-04-26
Payer: MEDICARE

## 2024-04-26 VITALS
TEMPERATURE: 98 F | RESPIRATION RATE: 20 BRPM | SYSTOLIC BLOOD PRESSURE: 171 MMHG | DIASTOLIC BLOOD PRESSURE: 97 MMHG | OXYGEN SATURATION: 95 % | HEIGHT: 66 IN | HEART RATE: 71 BPM | BODY MASS INDEX: 21.86 KG/M2 | WEIGHT: 136 LBS

## 2024-04-26 DIAGNOSIS — C34.91 ADENOCARCINOMA OF RIGHT LUNG: Primary | ICD-10-CM

## 2024-04-26 PROBLEM — I48.92 ATRIAL FLUTTER: Status: RESOLVED | Noted: 2024-04-25 | Resolved: 2024-04-26

## 2024-04-26 LAB
ALBUMIN SERPL BCP-MCNC: 3 G/DL (ref 3.5–5.2)
ALP SERPL-CCNC: 79 U/L (ref 55–135)
ALT SERPL W/O P-5'-P-CCNC: 23 U/L (ref 10–44)
ANION GAP SERPL CALC-SCNC: 8 MMOL/L (ref 8–16)
AST SERPL-CCNC: 18 U/L (ref 10–40)
BILIRUB SERPL-MCNC: 0.6 MG/DL (ref 0.1–1)
BUN SERPL-MCNC: 43 MG/DL (ref 8–23)
CALCIUM SERPL-MCNC: 8.7 MG/DL (ref 8.7–10.5)
CHLORIDE SERPL-SCNC: 106 MMOL/L (ref 95–110)
CO2 SERPL-SCNC: 22 MMOL/L (ref 23–29)
CREAT SERPL-MCNC: 1.1 MG/DL (ref 0.5–1.4)
EST. GFR  (NO RACE VARIABLE): >60 ML/MIN/1.73 M^2
GLUCOSE SERPL-MCNC: 124 MG/DL (ref 70–110)
POTASSIUM SERPL-SCNC: 4.3 MMOL/L (ref 3.5–5.1)
PROT SERPL-MCNC: 6.5 G/DL (ref 6–8.4)
SODIUM SERPL-SCNC: 136 MMOL/L (ref 136–145)
TACROLIMUS BLD-MCNC: 7.1 NG/ML (ref 5–15)

## 2024-04-26 PROCEDURE — 25000003 PHARM REV CODE 250

## 2024-04-26 PROCEDURE — 80197 ASSAY OF TACROLIMUS: CPT | Performed by: STUDENT IN AN ORGANIZED HEALTH CARE EDUCATION/TRAINING PROGRAM

## 2024-04-26 PROCEDURE — 63600175 PHARM REV CODE 636 W HCPCS: Performed by: STUDENT IN AN ORGANIZED HEALTH CARE EDUCATION/TRAINING PROGRAM

## 2024-04-26 PROCEDURE — 25000003 PHARM REV CODE 250: Performed by: STUDENT IN AN ORGANIZED HEALTH CARE EDUCATION/TRAINING PROGRAM

## 2024-04-26 PROCEDURE — 80053 COMPREHEN METABOLIC PANEL: CPT | Performed by: STUDENT IN AN ORGANIZED HEALTH CARE EDUCATION/TRAINING PROGRAM

## 2024-04-26 RX ORDER — ACETAMINOPHEN 500 MG
1000 TABLET ORAL EVERY 8 HOURS
COMMUNITY
Start: 2024-04-26

## 2024-04-26 RX ORDER — GABAPENTIN 300 MG/1
300 CAPSULE ORAL 3 TIMES DAILY
Qty: 90 CAPSULE | Refills: 11 | Status: SHIPPED | OUTPATIENT
Start: 2024-04-26 | End: 2025-04-26

## 2024-04-26 RX ORDER — AMOXICILLIN 250 MG
1 CAPSULE ORAL DAILY
Qty: 20 TABLET | Refills: 0 | Status: SHIPPED | OUTPATIENT
Start: 2024-04-26

## 2024-04-26 RX ORDER — TACROLIMUS 0.5 MG/1
0.5 CAPSULE ORAL EVERY 12 HOURS
Qty: 60 CAPSULE | Refills: 1 | Status: SHIPPED | OUTPATIENT
Start: 2024-04-26

## 2024-04-26 RX ORDER — METOPROLOL TARTRATE 25 MG/1
12.5 TABLET, FILM COATED ORAL 3 TIMES DAILY
Qty: 135 TABLET | Refills: 3 | Status: SHIPPED | OUTPATIENT
Start: 2024-04-26 | End: 2025-04-26

## 2024-04-26 RX ORDER — METHOCARBAMOL 750 MG/1
750 TABLET, FILM COATED ORAL 4 TIMES DAILY
Qty: 40 TABLET | Refills: 0 | Status: SHIPPED | OUTPATIENT
Start: 2024-04-26 | End: 2024-05-06

## 2024-04-26 RX ORDER — OXYCODONE HYDROCHLORIDE 15 MG/1
15 TABLET ORAL EVERY 6 HOURS PRN
Qty: 41 TABLET | Refills: 0 | Status: SHIPPED | OUTPATIENT
Start: 2024-04-26

## 2024-04-26 RX ADMIN — METOPROLOL TARTRATE 12.5 MG: 25 TABLET, FILM COATED ORAL at 08:04

## 2024-04-26 RX ADMIN — TACROLIMUS 1 MG: 1 CAPSULE ORAL at 08:04

## 2024-04-26 RX ADMIN — OXYCODONE HYDROCHLORIDE 15 MG: 10 TABLET ORAL at 12:04

## 2024-04-26 RX ADMIN — DOCUSATE SODIUM AND SENNOSIDES 1 TABLET: 8.6; 5 TABLET, FILM COATED ORAL at 08:04

## 2024-04-26 RX ADMIN — OXYCODONE HYDROCHLORIDE 15 MG: 10 TABLET ORAL at 06:04

## 2024-04-26 RX ADMIN — METHOCARBAMOL 750 MG: 750 TABLET ORAL at 08:04

## 2024-04-26 RX ADMIN — ACETAMINOPHEN 1000 MG: 500 TABLET ORAL at 05:04

## 2024-04-26 RX ADMIN — FAMOTIDINE 20 MG: 20 TABLET ORAL at 08:04

## 2024-04-26 RX ADMIN — GABAPENTIN 300 MG: 300 CAPSULE ORAL at 08:04

## 2024-04-26 NOTE — TELEPHONE ENCOUNTER
Had labs scheduled for 4/30/24, will sent message to patient     ----- Message from Shari Shetty RN sent at 4/26/2024  4:09 PM CDT -----  Regarding: Hospiital discharge follow up  Mr. Greir had a lobectomy of the lung due to adenocarcinoma along with lymphadenectomy as well for suspicious lymph node. His level of tac was 7.1  which was felt to be too high so his prograf was decreased to 0.5mg BID.  Please check labs next week.   Thank you  Josseline

## 2024-04-26 NOTE — DISCHARGE SUMMARY
[facility]  Thoracic Surgery  Discharge Summary    Patient Name: Mario Grier  MRN: [id number]  Admission Date: 4/22/2024  Hospital Length of Stay: 4 days  Discharge Date and Time:  04/26/2024 7:45 AM  Attending Physician: Aki Hernadez MD   Discharging Provider: Cezar Portillo PA-C  Primary Care Provider: Ariel Rodriguez III, MD    HPI:   Patient is a 69 y.o. male smoker s/p OLTX 2013 (HCC), hyperthyroidism, BPH, and hx substance abuse here today for RUL NSCLC. Yearly CAP CT per transplant showed right upper lobe cavitary lesion. Referred to pulmonary. PET January 2024 showed RUL lesion with SUV 6.7 without mediastinal or hilar avidity. Percutaneous biopsy positive for adenocarcinoma. Completed PFTs. Comes today with updated chest CT. Reports weight loss and poor appetite.      Current smoker. ~ 50 pack years.   Liver transplant, appendectomy, orbital fracture (metal)     Procedure(s) (LRB):  XI ROBOTIC RIGHT UPPER  LOBECTOMY,LUNG (Right)  LYMPHADENECTOMY (Right)  BLOCK, NERVE, INTERCOSTAL, 2 OR MORE (Right)      Hospital Course: Patient admitted Monday, 04/22/24, following robotic right upper lobectomy with MLND. Tolerated procedure well. POD0 complaints of pain overnight and placed on PCA pump by on-call intern. POD1 PCA pump discontinued and oral medications adjusted for history of substance abuse and chronic opioids reported in patient's medical record. Chest tube x2 in place on right side with approx. 300 ml serosanguinous output and small air leak in 24 fr lowell drain. POD2 24 fr chest tube removed. Episode of SVT evening of POD2. Rapid response called. Intern on-call pushed adenosine x2 with 15 second pause, did not convert. EP Cardiology consulted, recommended metoprolol which resulted in conversion back to NSR. POD3 patient doing well, low chest tube output. Air leak remains upon interrogation of atrium. Additionally, patient experience 2nd episode of SVT requiring 5 mg metoprolol IV x4 prior  to conversion back to NSR. Started on PO metoprolol 12.5 mg TID without incident since initiation. POD4 patient's chest tube connected to mini atrium, HDS, NSR, pain controlled, and ready for discharge home.     Goals of Care Treatment Preferences:  Code Status: Full Code    Living Will: Yes              Consults (From admission, onward)          Status Ordering Provider     Inpatient consult to Electrophysiology  Once        Provider:  (Not yet assigned)    Completed SULLY HORN     Inpatient consult to Hepatology  Once        Provider:  (Not yet assigned)    Completed JENARO BLOUNT            Significant Diagnostic Studies: Radiology: X-Ray: CXR: X-Ray Chest 1 View (CXR):   Results for orders placed or performed during the hospital encounter of 04/22/24   X-Ray Chest AP Single View    Narrative    EXAMINATION:  XR CHEST 1 VIEW    CLINICAL HISTORY:  Post-Op;    TECHNIQUE:  One view    COMPARISON:  Comparison is made to 04/24/2024.    FINDINGS:  Right-sided chest tube again seen.  Heart size remains normal and the appearance of the cardiomediastinal silhouette has not changed appreciably since the prior exam referenced above.  Lung zones also appear stable, with continued demonstration of subsegmental atelectasis in the left lower lung zone but with no new areas of airspace consolidation or volume loss evident.  No pleural fluid.  Small right pneumothorax persists, without significant interval change in volume.      Impression    No significant detrimental interval change in the appearance of the chest since 04/24/2024.  Small right pneumothorax persists, with no interval increase in volume since that time.      Electronically signed by: Дмитрий Davis MD  Date:    04/25/2024  Time:    06:28       Pending Diagnostic Studies:       Procedure Component Value Units Date/Time    Specimen to Pathology, Surgery Pulmonary and Thoracic [[id number]] Collected: 04/22/24 1512    Order Status: Sent Lab Status: In process  Updated: 04/23/24 0743    Specimen: Tissue     X-Ray Chest AP Portable [[id number]]     Order Status: Sent Lab Status: No result           Final Active Diagnoses:    Diagnosis Date Noted POA    PRINCIPAL PROBLEM:  Adenocarcinoma of right lung [C34.91] 04/19/2024 Yes    Atrial flutter [I48.92] 04/25/2024 No    Hypokalemia [E87.6] 04/24/2024 Unknown    Hyponatremia [E87.1] 04/24/2024 Yes    Liver fibrosis, transplanted liver [T86.49, K74.00] 01/20/2020 Yes    Liver replaced by transplant [Z94.4] 10/31/2014 Not Applicable    Prophylactic immunotherapy [Z29.89] 10/21/2013 Not Applicable     Chronic    Hepatitis B virus infection in cadaveric donor [Z00.5, B19.10] 07/16/2013 Not Applicable      Problems Resolved During this Admission:      Discharged Condition: good    Disposition: Home or Self Care    Follow Up:    Patient Instructions:      Diet Adult Regular     No driving until:   Order Comments: No driving until all narcotic medications have been discontinued     Notify your health care provider if you experience any of the following:  temperature >100.4     Notify your health care provider if you experience any of the following:  persistent nausea and vomiting or diarrhea     Notify your health care provider if you experience any of the following:  severe uncontrolled pain     Notify your health care provider if you experience any of the following:  redness, tenderness, or signs of infection (pain, swelling, redness, odor or green/yellow discharge around incision site)     Notify your health care provider if you experience any of the following:  difficulty breathing or increased cough     Notify your health care provider if you experience any of the following:  severe persistent headache     Notify your health care provider if you experience any of the following:  worsening rash     Notify your health care provider if you experience any of the following:  persistent dizziness, light-headedness, or visual  disturbances     Notify your health care provider if you experience any of the following:  increased confusion or weakness     Remove dressing in 48 hours     Activity as tolerated     Medications:  Reconciled Home Medications:      Medication List        START taking these medications      acetaminophen 500 MG tablet  Commonly known as: TYLENOL  Take 2 tablets (1,000 mg total) by mouth every 8 (eight) hours.     gabapentin 300 MG capsule  Commonly known as: NEURONTIN  Take 1 capsule (300 mg total) by mouth 3 (three) times daily.     methocarbamoL 750 MG Tab  Commonly known as: ROBAXIN  Take 1 tablet (750 mg total) by mouth 4 (four) times daily. for 10 days     metoprolol tartrate 25 MG tablet  Commonly known as: LOPRESSOR  Take 0.5 tablets (12.5 mg total) by mouth 3 (three) times daily.     senna-docusate 8.6-50 mg 8.6-50 mg per tablet  Commonly known as: PERICOLACE  Take 1 tablet by mouth once daily.            CHANGE how you take these medications      oxyCODONE 15 MG Tab  Commonly known as: ROXICODONE  Take 1 tablet (15 mg total) by mouth every 6 (six) hours as needed for Pain.  What changed:   medication strength  how much to take  when to take this  reasons to take this     tacrolimus 1 MG Cap  Commonly known as: PROGRAF  TAKE 1 CAPSULE BY MOUTH EVERY 12 HOURS  What changed: when to take this            CONTINUE taking these medications      diazePAM 10 MG Tab  Commonly known as: VALIUM  Take 10 mg by mouth nightly as needed.     emtricitabine-tenofovir 200-300 mg 200-300 mg Tab  Commonly known as: TRUVADA  TAKE 1 TABLET BY MOUTH 1 TIME A DAY     promethazine 25 MG tablet  Commonly known as: PHENERGAN  Take 0.5 tablets (12.5 mg total) by mouth every 6 (six) hours as needed for Nausea.     tamsulosin 0.4 mg Cap  Commonly known as: FLOMAX  Take 0.4 mg by mouth every evening.              Cezar Portillo PA-C  Thoracic Surgery  [facility]

## 2024-04-26 NOTE — ASSESSMENT & PLAN NOTE
69 y.o. male smoker s/p OLTX 2013 (HCC), hyperthyroidism, BPH, and hx substance abuse who is POD1 s/p robotic right upper lobectomy with MLND    - 24F lowell drain connected to mini atrium.   - Discharge home   - Follow up in 2 weeks with CXR  - Daily CXR while CT in place  - Cardiology consulted overnight for SVT/AF. Now in NSR. Does not need anticoagulation/antiarrhythmics since <24 hours and single episode.  - Daily Tacro level and BMP  - Hepatology/Liver transplant consulted following surgery. Appreciate recommendations and assistance with management  - Multimodal pain management: PRNs adjusted  - OOB, ambulate TID in hallway  - IS use  - Regular diet    Dispo: likely discharge tomorrow

## 2024-04-26 NOTE — PLAN OF CARE
CHW met with patient/family at bedside. Patient experience rounding completed and reviewed the following.     Do you know your discharge plan? Yes or No,    If yes, what is the plan? (Home, Home Health, Rehab, SNF, LTAC, or Other)   Home    Have you discussed your needs and preferences with your SW/CM? Yes or No   Yes    If you are discharging home, do you have help at home? Yes or No   Yes    Do you think you will need help additional at home at discharge? Yes or No    No    Do you currently have difficulty keeping up with bills, affording medicine or buying food? Yes or No   No    Assigned SW/CM notified of any patient/family needs or concerns. Appropriate resources provided to address patient's needs.     Sixto Cutler CHW  Case Management   [phone]

## 2024-04-26 NOTE — NURSING
"[facility] Plan of Care Note    Dx:   Adenocarcinoma of right lung [C34.91]  NSCLC of right lung [C34.91]    Shift Events: Pt complained of waking from his sleep with a cough. HR remained stable.    Goals of Care: Incentive Spirometer, pain control    Neuro: WDL    Vital Signs: /86 (Patient Position: Lying)   Pulse 76   Temp 98.7 °F (37.1 °C) (Oral)   Resp 18   Ht 5' 6" (1.676 m)   Wt 61.7 kg (136 lb)   SpO2 96%   BMI 21.95 kg/m²     Respiratory: WDL ex cough    Diet: Diet Adult Regular (IDDSI Level 7)      Is patient tolerating current diet? yes    GTTS: NA    Urine Output/Bowel Movement:   I/O this shift:  In: -   Out: 20 [Chest Tube:20]  Last Bowel Movement: 04/24/24      Drains/Tubes/Tube Feeds (include total output/shift):   I/O this shift:  In: -   Out: 20 [Chest Tube:20]      Lines: PIV x 1      Accuchecks:none    Skin: WDL ex chest tube    Fall Risk Score: see flow sheets    Activity level? Edge of bed activities    Any scheduled procedures? NA    Any safety concerns? NA    Other: NA   "

## 2024-04-26 NOTE — TELEPHONE ENCOUNTER
Report sent to post transplant liver coordinator re: change in Prograf dose and need for labs next week.

## 2024-04-26 NOTE — TREATMENT PLAN
GI Treatment Plan    Mario Grier is a 69 y.o. male admitted to hospital 4/22/2024 (Hospital Day: 5) due to Adenocarcinoma of right lung.     Interval History  Tacro level 7.1.     Objective  Temp:  [97.6 °F (36.4 °C)-98.7 °F (37.1 °C)] 98.4 °F (36.9 °C) (04/26 0728)  Pulse:  [59-76] 71 (04/26 0820)  BP: (133-171)/(85-97) 171/97 (04/26 0728)  Resp:  [16-20] 20 (04/26 0728)  SpO2:  [95 %-97 %] 95 % (04/26 0728)    Laboratory    Recent Labs   Lab 04/22/24 2314   HGB 14.0       Lab Results   Component Value Date    WBC 14.25 (H) 04/22/2024    HGB 14.0 04/22/2024    HCT 41.4 04/22/2024     (H) 04/22/2024     04/22/2024       Lab Results   Component Value Date     04/26/2024    K 4.3 04/26/2024     04/26/2024    CO2 22 (L) 04/26/2024    BUN 43 (H) 04/26/2024    CREATININE 1.1 04/26/2024    CALCIUM 8.7 04/26/2024    ANIONGAP 8 04/26/2024    ESTGFRAFRICA >60.0 05/31/2022    EGFRNONAA 56.9 (A) 05/31/2022       Lab Results   Component Value Date    ALT 23 04/26/2024    AST 18 04/26/2024     (H) 08/30/2017    ALKPHOS 79 04/26/2024    BILITOT 0.6 04/26/2024       Lab Results   Component Value Date    INR 1.0 04/04/2024    INR 1.1 03/19/2024    INR 1.1 02/28/2024     Assessment:  69 year old male with OLTx liver (2013) on chronic tacrolimus (1/1) and home truvada given HbcAb + donor liver presenting with adenocarcinoma of the RUL now status-post RUL lobectomy with mediastinal lymph node dissection. Given new malignancy, would favor tacrolimus levels remaining low.     Plan  -Tacrolimus level 7.1 this morning. Given his malignancy, will try to keep Tacrolimus low. Plan to reduce Tacrolimus to 0.5mg BID. Will need outpatient follow up with Hepatology and repeat labs (will arrange).   -Plan of care was discussed with primary team.  -We are signing-off. Please call with any questions.    Thank you for involving us in the care of Mario Grier. Please call with any additional questions, concerns  or changes in the patient's clinical status.    Dang Hartley MD  Gastroenterology Fellow

## 2024-04-26 NOTE — PLAN OF CARE
Problem: Adult Inpatient Plan of Care  Goal: Plan of Care Review  Outcome: Progressing  Goal: Patient-Specific Goal (Individualized)  Outcome: Progressing  Goal: Absence of Hospital-Acquired Illness or Injury  Outcome: Progressing  Goal: Optimal Comfort and Wellbeing  Outcome: Progressing  Goal: Readiness for Transition of Care  Outcome: Progressing     Problem: Fall Injury Risk  Goal: Absence of Fall and Fall-Related Injury  Outcome: Progressing     Problem: Skin Injury Risk Increased  Goal: Skin Health and Integrity  Outcome: Progressing     Problem: Wound  Goal: Optimal Coping  Outcome: Progressing  Goal: Optimal Functional Ability  Outcome: Progressing  Goal: Absence of Infection Signs and Symptoms  Outcome: Progressing  Goal: Improved Oral Intake  Outcome: Progressing  Goal: Optimal Pain Control and Function  Outcome: Progressing  Goal: Skin Health and Integrity  Outcome: Progressing  Goal: Optimal Wound Healing  Outcome: Progressing

## 2024-04-30 DIAGNOSIS — C34.91 ADENOCARCINOMA OF RIGHT LUNG: Primary | ICD-10-CM

## 2024-04-30 LAB
FINAL PATHOLOGIC DIAGNOSIS: NORMAL
GROSS: NORMAL
Lab: NORMAL
MICROSCOPIC EXAM: NORMAL

## 2024-04-30 NOTE — PLAN OF CARE
[facility]  Discharge Final Note    Primary Care Provider: Ariel Rodriguez III, MD  Expected Discharge Date: 4/26/2024    Patient medically ready for discharge to home.     Transportation by family.     Is family/patient aware of discharge: yes     Hospital follow up scheduled: yes       Final Discharge Note (most recent)       Final Note - 04/30/24 1518          Final Note    Assessment Type Final Discharge Note     Anticipated Discharge Disposition Home or Self Care     Hospital Resources/Appts/Education Provided Provided patient/caregiver with written discharge plan information                     Important Message from Medicare  Important Message from Medicare regarding Discharge Appeal Rights: Given to patient/caregiver, Explained to patient/caregiver, Signed/date by patient/caregiver   Date IMM was signed: 04/26/24  Time IMM was signed: 0802  Referral Info (most recent)       Referral Info    No documentation.                   Future Appointments   Date Time Provider Department Center   5/2/2024 10:00 AM HOSPITAL LAB, [facility] LAB [facility]   5/3/2024 10:00 AM [facility]IC-XRAY [facility]   5/3/2024 10:30 AM Aki Hernadez MD [facility] THORAC [place]   6/20/2024 10:00 AM VASCULAR, [facility] CAR[facility]   8/13/2024 10:30 AM Reinier Prater MD [facility] CARDIO [place]     Sixto Lainez RN  Case Management  Ext: [phone]  04/30/2024

## 2024-05-03 ENCOUNTER — OFFICE VISIT (OUTPATIENT)
Dept: CARDIOTHORACIC SURGERY | Facility: CLINIC | Age: 69
End: 2024-05-03
Payer: MEDICARE

## 2024-05-03 ENCOUNTER — HOSPITAL ENCOUNTER (OUTPATIENT)
Dept: RADIOLOGY | Facility: HOSPITAL | Age: 69
Discharge: HOME | End: 2024-05-03
Attending: PHYSICIAN ASSISTANT
Payer: MEDICARE

## 2024-05-03 VITALS
WEIGHT: 132.06 LBS | HEIGHT: 66 IN | HEART RATE: 61 BPM | OXYGEN SATURATION: 99 % | BODY MASS INDEX: 21.22 KG/M2 | DIASTOLIC BLOOD PRESSURE: 85 MMHG | SYSTOLIC BLOOD PRESSURE: 123 MMHG

## 2024-05-03 DIAGNOSIS — C34.91 ADENOCARCINOMA OF RIGHT LUNG: ICD-10-CM

## 2024-05-03 DIAGNOSIS — C34.11 MALIGNANT NEOPLASM OF UPPER LOBE OF RIGHT LUNG: Primary | ICD-10-CM

## 2024-05-03 PROCEDURE — 3074F SYST BP LT 130 MM HG: CPT | Mod: CPTII,S$GLB,, | Performed by: THORACIC SURGERY (CARDIOTHORACIC VASCULAR SURGERY)

## 2024-05-03 PROCEDURE — 3079F DIAST BP 80-89 MM HG: CPT | Mod: CPTII,S$GLB,, | Performed by: THORACIC SURGERY (CARDIOTHORACIC VASCULAR SURGERY)

## 2024-05-03 PROCEDURE — 1159F MED LIST DOCD IN RCRD: CPT | Mod: CPTII,S$GLB,, | Performed by: THORACIC SURGERY (CARDIOTHORACIC VASCULAR SURGERY)

## 2024-05-03 PROCEDURE — 71046 X-RAY EXAM CHEST 2 VIEWS: CPT | Mod: 26,,, | Performed by: STUDENT IN AN ORGANIZED HEALTH CARE EDUCATION/TRAINING PROGRAM

## 2024-05-03 PROCEDURE — 71046 X-RAY EXAM CHEST 2 VIEWS: CPT | Mod: TC

## 2024-05-03 PROCEDURE — 1101F PT FALLS ASSESS-DOCD LE1/YR: CPT | Mod: CPTII,S$GLB,, | Performed by: THORACIC SURGERY (CARDIOTHORACIC VASCULAR SURGERY)

## 2024-05-03 PROCEDURE — 99024 POSTOP FOLLOW-UP VISIT: CPT | Mod: S$GLB,,, | Performed by: THORACIC SURGERY (CARDIOTHORACIC VASCULAR SURGERY)

## 2024-05-03 PROCEDURE — 1126F AMNT PAIN NOTED NONE PRSNT: CPT | Mod: CPTII,S$GLB,, | Performed by: THORACIC SURGERY (CARDIOTHORACIC VASCULAR SURGERY)

## 2024-05-03 PROCEDURE — 3288F FALL RISK ASSESSMENT DOCD: CPT | Mod: CPTII,S$GLB,, | Performed by: THORACIC SURGERY (CARDIOTHORACIC VASCULAR SURGERY)

## 2024-05-03 PROCEDURE — 99999 PR PBB SHADOW E&M-EST. PATIENT-LVL III: CPT | Mod: PBBFAC,,, | Performed by: THORACIC SURGERY (CARDIOTHORACIC VASCULAR SURGERY)

## 2024-05-03 PROCEDURE — 1157F ADVNC CARE PLAN IN RCRD: CPT | Mod: CPTII,S$GLB,, | Performed by: THORACIC SURGERY (CARDIOTHORACIC VASCULAR SURGERY)

## 2024-05-03 NOTE — PROGRESS NOTES
Subjective     Patient ID: Mario Grier is a 69 y.o. male.    Chief Complaint: Post-op Evaluation    Diagnosis:  RUL NSCLC - adenocarcinoma    Pre-operative therapy: none    Procedure(s) and date(s): 04/22/24 - robotic right upper lobectomy with MLND    Pathology: 4.2 cm.Poorly differentiated, invasive, acinar adenocarcinoma. Margins negative. negative VPI. negative COLEMAN. positive Lymphovascular invasion. LN stations 2,4,7,8,11,12 negative. [id number], Stage 1B.       Post-operative therapy: Discuss in Thoracic TB 05/08/24; possible need for adjuvant systemic therapy     HPI  69 y.o. male who presents for 1 week follow up for chest tube removal s/p robotic right upper lobectomy with MLND. Tolerated procedure well. Post-operative course complicated by SVT on evening of POD2.  POD0 complaints of pain overnight and placed on PCA pump by on-call intern. Rapid response called. Intern on-call pushed adenosine x2 with 15 second pause, did not convert. EP Cardiology consulted, recommended metoprolol which resulted in conversion back to NSR. POD3 patient doing well, low chest tube output. Air leak remains upon interrogation of atrium. Additionally, patient experience 2nd episode of SVT requiring 5 mg metoprolol IV x4 prior to conversion back to NSR. Started on PO metoprolol 12.5 mg TID without incident since initiation. POD4 patient's chest tube connected to mini atrium, HDS, NSR, pain controlled, and ready for discharge home. Today patient reports he is doing well. Denies CP, SOB, cough, fever, fatigue.     Review of Systems   Constitutional:  Negative for diaphoresis, fatigue, fever and unexpected weight change.   Respiratory:  Negative for cough, shortness of breath, wheezing and stridor.    Cardiovascular: Negative.  Negative for chest pain, palpitations, leg swelling and claudication.   Neurological: Negative.  Negative for dizziness, syncope, weakness and light-headedness.   Hematological: Negative.     Psychiatric/Behavioral: Negative.            Objective     Physical Exam  Constitutional:       Appearance: Normal appearance. He is normal weight.   Eyes:      Extraocular Movements: Extraocular movements intact.   Cardiovascular:      Rate and Rhythm: Normal rate and regular rhythm.      Pulses: Normal pulses.   Pulmonary:      Effort: Pulmonary effort is normal.      Breath sounds: Normal breath sounds.      Comments: Chest tube removed.   Abdominal:      General: Abdomen is flat.      Palpations: Abdomen is soft.   Skin:     General: Skin is warm and dry.      Comments: Incisions healing well. C/D/I   Neurological:      General: No focal deficit present.      Mental Status: He is alert and oriented to person, place, and time. Mental status is at baseline.   Psychiatric:         Mood and Affect: Mood normal.         Behavior: Behavior normal.         Thought Content: Thought content normal.         Diagnostics:     CXR 05/03/24: Support devices, tubes and lines: Right-sided chest tube in stable position. Lungs/airways, pleura: No consolidations or opacities.No pleural effusion No pneumothorax. Heart/mediastinum:  The heart is normal in size. Osseous Structures:  Unremarkable       Assessment and Plan   69 y.o. male who presents for 1 week follow up for chest tube removal s/p robotic right upper lobectomy with MLND.    Plan:     Discuss in Thoracic TB 05/08/24  Air leak resolved. Chest tube removed in clinic.

## 2024-05-06 ENCOUNTER — TELEPHONE (OUTPATIENT)
Dept: TRANSPLANT | Facility: CLINIC | Age: 69
End: 2024-05-06
Payer: MEDICARE

## 2024-05-06 NOTE — LETTER
May 6, 2024    Mario Marvel  [address]  [place] [address]          Dear Mario Grier:  MRN: [id number]    This is a follow up to your recent labs, your lab results were stable.  There are no medicine changes.  Please have your labs drawn again on 7/8/24.      If you cannot have your labs drawn on the scheduled date, it is your responsibility to call the transplant department to reschedule.  Please call [phone] and ask to speak to Shari PRIETO -  for all scheduling requests.     Sincerely,    Marybeth Ruiz, RN,BSN,CCTC    Your Liver Transplant Coordinator    [facility]  [address] [place] [id number] [phone]

## 2024-05-06 NOTE — TELEPHONE ENCOUNTER
Labs reviewed and are stable, no med changes needed. Will repeat in July as previously scheduled.       ----- Message from Jim Ball MD sent at 5/5/2024  8:49 AM CDT -----  Results reviewed. No action.

## 2024-05-08 ENCOUNTER — TELEPHONE (OUTPATIENT)
Dept: TRANSPLANT | Facility: CLINIC | Age: 69
End: 2024-05-08
Payer: MEDICARE

## 2024-05-08 ENCOUNTER — TELEPHONE (OUTPATIENT)
Dept: HEMATOLOGY/ONCOLOGY | Facility: CLINIC | Age: 69
End: 2024-05-08
Payer: MEDICARE

## 2024-05-08 NOTE — NURSING
Oncology Navigation   Intake  Date of Diagnosis: 03/01/24  Cancer Type: Thoracic (lung adenocarcinoma, lepidic type)  Type of Referral: External  Date of Referral: 03/04/24  Initial Nurse Navigator Contact: 03/12/24  Referral to Initial Contact Timeline (days): 8  Date Worked: 05/08/24  First Appointment Available: 03/19/24  Appointment Date: 03/19/24  First Available Date vs. Scheduled Date (days): 0     Treatment  Current Status: Active  Date Presented to Tumor Board: 05/08/24  Presentation Notes: Recommend referral to Med/Onc for discussion of adjuvant systemic therapy for Stage IB disease.    Surgical Oncologist: Maricarmen  Consult Date: 03/19/24    Medical Oncologist: Irene  Consult Date: 05/16/24       Procedures: Biopsy; PET scan  Biopsy Schedule Date: 02/28/24  PET Scan Schedule Date: 01/29/24             Support Systems: Spouse/significant other  Barriers of Care: Transportation  Transportation Barriers: Lives in [facility]  requests to be seen at [facility]     Acuity      Follow Up  No follow-ups on file.

## 2024-05-08 NOTE — TELEPHONE ENCOUNTER
Informed pt this is ok to do for a pre cancerous area on his face.         ----- Message from Jm Narayan PharmD sent at 5/8/2024  2:07 PM CDT -----  Regarding: RE: discuss suggested derm treatment  Contact:  [phone]  Marybeth,    Yes, these seem ok to me.    Thank you,  Jm Narayan, Wyatt. BCTXP, BCPS  ----- Message -----  From: Marybeth Ruiz RN  Sent: 5/8/2024   2:01 PM CDT  To: Abdominal Transplant Pharmacists  Subject: FW: discuss suggested derm treatment             This is ok right?  ----- Message -----  From: Katie Kelley  Sent: 5/8/2024  12:02 PM CDT  To: [facility] Post-Liver Transplant Clinical  Subject: discuss suggested derm treatment                 The patient called requesting to speak to Nurse   Photodynamic therapy  ALA (Alphalevulinic) acid treatment and need to discuss with   Needs to clear with the therapy     No further information provided      Patient can be contacted @# [phone]

## 2024-05-11 LAB
DNA RANGE(S) EXAMINED NAR: NORMAL
GENE DIS ANL INTERP-IMP: POSITIVE
GENE DIS ASSESSED: NORMAL
GENE MUT TESTED BLD/T: 15.8 M/MB
MSI CA SPEC-IMP: NORMAL
PD-L1 BY 22C3 TISS IMSTN DOC: NORMAL
REASON FOR STUDY: NORMAL
TEMPUS FUSIONADDENDUM: NORMAL
TEMPUS LCA: NORMAL
TEMPUS PD-L1 (22C3) COMBINED POSITIVE SCORE: 80
TEMPUS PD-L1 (22C3) TUMOR PROPORTION SCORE: 80 %
TEMPUS PERTINENTNEGATIVES: NORMAL
TEMPUS PORTAL: NORMAL
TEMPUS THERAPY1: NORMAL
TEMPUS THERAPY2: NORMAL
TEMPUS THERAPYCOUNT: 2
TEMPUS TRIAL1: NORMAL
TEMPUS TRIAL2: NORMAL
TEMPUS TRIAL3: NORMAL
TEMPUS TRIALCOUNT: 3

## 2024-05-15 ENCOUNTER — TELEPHONE (OUTPATIENT)
Dept: HEMATOLOGY/ONCOLOGY | Facility: CLINIC | Age: 69
End: 2024-05-15
Payer: MEDICARE
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
## 2024-05-15 NOTE — NURSING
Upon chart review for any post op needs, I noticed patient was scheduled to see med onc at [facility] to discuss adjuvant therapy. Patient lives in [place] and was seen here in [place] for his initial consults.   I reached out to patient, and he happily agreed to switch his appointments to [place] to stay closer to home.  Patient accepted appt tomorrow with Dr. Mccray.  Date, time, and location confirmed.

## 2024-05-16 ENCOUNTER — PATIENT MESSAGE (OUTPATIENT)
Dept: CARDIOLOGY | Facility: CLINIC | Age: 69
End: 2024-05-16
Payer: MEDICARE

## 2024-05-16 ENCOUNTER — PATIENT MESSAGE (OUTPATIENT)
Dept: ADMINISTRATIVE | Facility: OTHER | Age: 69
End: 2024-05-16
Payer: MEDICARE

## 2024-05-16 ENCOUNTER — TELEPHONE (OUTPATIENT)
Dept: HEMATOLOGY/ONCOLOGY | Facility: CLINIC | Age: 69
End: 2024-05-16

## 2024-05-16 ENCOUNTER — OFFICE VISIT (OUTPATIENT)
Dept: HEMATOLOGY/ONCOLOGY | Facility: CLINIC | Age: 69
End: 2024-05-16
Payer: MEDICARE

## 2024-05-16 VITALS
TEMPERATURE: 98 F | HEIGHT: 66 IN | DIASTOLIC BLOOD PRESSURE: 65 MMHG | RESPIRATION RATE: 17 BRPM | HEART RATE: 60 BPM | SYSTOLIC BLOOD PRESSURE: 114 MMHG | BODY MASS INDEX: 21.11 KG/M2 | WEIGHT: 131.38 LBS | OXYGEN SATURATION: 97 %

## 2024-05-16 DIAGNOSIS — C34.91 ADENOCARCINOMA OF RIGHT LUNG: Primary | ICD-10-CM

## 2024-05-16 DIAGNOSIS — B19.10 HEPATITIS B VIRUS INFECTION IN CADAVERIC DONOR: ICD-10-CM

## 2024-05-16 DIAGNOSIS — Z00.5 HEPATITIS B VIRUS INFECTION IN CADAVERIC DONOR: ICD-10-CM

## 2024-05-16 DIAGNOSIS — C34.91 ADENOCARCINOMA OF RIGHT LUNG: ICD-10-CM

## 2024-05-16 DIAGNOSIS — Z79.899 IMMUNODEFICIENCY DUE TO CHEMOTHERAPY: ICD-10-CM

## 2024-05-16 DIAGNOSIS — T45.1X5A IMMUNODEFICIENCY DUE TO CHEMOTHERAPY: ICD-10-CM

## 2024-05-16 DIAGNOSIS — D84.821 IMMUNODEFICIENCY DUE TO CHEMOTHERAPY: ICD-10-CM

## 2024-05-16 DIAGNOSIS — K74.00 LIVER FIBROSIS, TRANSPLANTED LIVER: Primary | ICD-10-CM

## 2024-05-16 DIAGNOSIS — T86.49 LIVER FIBROSIS, TRANSPLANTED LIVER: Primary | ICD-10-CM

## 2024-05-16 PROBLEM — Z79.69 IMMUNODEFICIENCY DUE TO CHEMOTHERAPY: Status: ACTIVE | Noted: 2024-05-16

## 2024-05-16 PROCEDURE — 99205 OFFICE O/P NEW HI 60 MIN: CPT | Mod: S$GLB,,, | Performed by: INTERNAL MEDICINE

## 2024-05-16 PROCEDURE — 3074F SYST BP LT 130 MM HG: CPT | Mod: CPTII,S$GLB,, | Performed by: INTERNAL MEDICINE

## 2024-05-16 PROCEDURE — 1157F ADVNC CARE PLAN IN RCRD: CPT | Mod: CPTII,S$GLB,, | Performed by: INTERNAL MEDICINE

## 2024-05-16 PROCEDURE — 1111F DSCHRG MED/CURRENT MED MERGE: CPT | Mod: CPTII,S$GLB,, | Performed by: INTERNAL MEDICINE

## 2024-05-16 PROCEDURE — 99999 PR PBB SHADOW E&M-EST. PATIENT-LVL V: CPT | Mod: PBBFAC,,, | Performed by: INTERNAL MEDICINE

## 2024-05-16 PROCEDURE — G2211 COMPLEX E/M VISIT ADD ON: HCPCS | Mod: S$GLB,,, | Performed by: INTERNAL MEDICINE

## 2024-05-16 PROCEDURE — 3078F DIAST BP <80 MM HG: CPT | Mod: CPTII,S$GLB,, | Performed by: INTERNAL MEDICINE

## 2024-05-16 PROCEDURE — 1126F AMNT PAIN NOTED NONE PRSNT: CPT | Mod: CPTII,S$GLB,, | Performed by: INTERNAL MEDICINE

## 2024-05-16 PROCEDURE — 1101F PT FALLS ASSESS-DOCD LE1/YR: CPT | Mod: CPTII,S$GLB,, | Performed by: INTERNAL MEDICINE

## 2024-05-16 PROCEDURE — 1159F MED LIST DOCD IN RCRD: CPT | Mod: CPTII,S$GLB,, | Performed by: INTERNAL MEDICINE

## 2024-05-16 PROCEDURE — 3008F BODY MASS INDEX DOCD: CPT | Mod: CPTII,S$GLB,, | Performed by: INTERNAL MEDICINE

## 2024-05-16 PROCEDURE — 3288F FALL RISK ASSESSMENT DOCD: CPT | Mod: CPTII,S$GLB,, | Performed by: INTERNAL MEDICINE

## 2024-05-16 RX ORDER — ONDANSETRON HYDROCHLORIDE 8 MG/1
8 TABLET, FILM COATED ORAL EVERY 6 HOURS PRN
Qty: 60 TABLET | Refills: 2 | Status: SHIPPED | OUTPATIENT
Start: 2024-05-16 | End: 2025-05-16

## 2024-05-16 RX ORDER — FOLIC ACID 1 MG/1
1 TABLET ORAL DAILY
Qty: 120 TABLET | Refills: 0 | Status: SHIPPED | OUTPATIENT
Start: 2024-05-16 | End: 2025-05-16

## 2024-05-16 RX ORDER — OLANZAPINE 5 MG/1
TABLET ORAL
Qty: 30 TABLET | Refills: 0 | Status: SHIPPED | OUTPATIENT
Start: 2024-05-16 | End: 2024-06-03 | Stop reason: SDUPTHER

## 2024-05-16 RX ORDER — PROCHLORPERAZINE MALEATE 10 MG
10 TABLET ORAL EVERY 6 HOURS PRN
Qty: 60 TABLET | Refills: 6 | Status: SHIPPED | OUTPATIENT
Start: 2024-05-16 | End: 2024-06-03 | Stop reason: SDUPTHER

## 2024-05-16 RX ORDER — DEXAMETHASONE 4 MG/1
TABLET ORAL
Qty: 40 TABLET | Refills: 0 | Status: SHIPPED | OUTPATIENT
Start: 2024-05-16

## 2024-05-16 NOTE — TELEPHONE ENCOUNTER
----- Message from Michael Gasca, Patient Care Assistant sent at 5/16/2024  1:57 PM CDT -----  Contact: [facility]  Type: Needs Medical Advice    Who Called: [facility]  Best Call Back Number: [phone]  Inquiry/Question: Pharm is calling to get frequency of pt's chemo therapy. Please advise Thank you~

## 2024-05-16 NOTE — PLAN OF CARE
START ON PATHWAY REGIMEN - Non-Small Cell Lung    [id number]        Pemetrexed (Alimta, Pemfexy)       Cisplatin           Additional Orders: Patient to receive the following prior to the   initiation of therapy:  1) Dexamethasone 4 mg orally twice daily x 6 doses.    First dose 24 hours before chemotherapy.  2) Folic acid greater than or equal to   400 mcg orally daily.  First dose at least 5 days prior to the first dose of   pemetrexed.  3) Vitamin B12 1,000 mcg intramuscularly every 9 weeks.  First dose   at least 5 days prior to the first dose of pemetrexed.    **Always confirm dose/schedule in your pharmacy ordering system**    Patient Characteristics:  Postoperative without Neoadjuvant Therapy (Pathologic Staging), Stage II-III,   Adjuvant Chemotherapy, Stage IIA, Nonsquamous Cell  Therapeutic Status: Postoperative without Neoadjuvant Therapy (Pathologic   Staging)  AJCC T Category: pT2b  AJCC N Category: pN0  AJCC M Category: cM0  AJCC 8 Stage Grouping: IIA  Adjuvant Chemotherapy Status: Adjuvant Chemotherapy  ALK Rearrangement Status: Negative  EGFR Mutation Status: Negative/Wild Type  Histology: Nonsquamous Cell  Intent of Therapy:  Curative Intent, Discussed with Patient

## 2024-05-16 NOTE — NURSING
Reached out to patient to coordinate start of treatment including chemo school.  Patient asked if I could call him back as they were still driving home from the MD office.       Attempted to call patient back but there was no answer. Unable to LVM at this time due to mailbox being full.

## 2024-05-16 NOTE — PATIENT INSTRUCTIONS
TAKE FOLIC ACID 1 TABLET DAILY  DEXAMETHASONE 4 mg: TAKE 2 TABLETS TWICE A DAY ON DAY THE DAY BEFORE AND FOR 4 DAYS AFTER CHEMO. DO NOT TAKE ON THE DAY OF CHEMO  ZYPREXA: TAKE 1 TABLET AT NIGHT FOR FOUR NIGHTS AFTER CHEMO  TAKE ZOFRAN OR COMPAZINE FOR NAUSEA/VOMITING  TAKE ZOFRAN FIRST AND THEN COMPAZINE  IF ZOFRAN DOES NOT HELP IN 30 minutes.  YOU CAN REPEAT ZOFRAN AND COMPAZINE EVERY 6 HOURS AS NEEDED

## 2024-05-16 NOTE — NURSING
Spoke with patient and confirmed all upcoming appts including audiogram, chemo school, labs, MD clearance, and first infusion visit.

## 2024-05-17 ENCOUNTER — TELEPHONE (OUTPATIENT)
Dept: CARDIOLOGY | Facility: CLINIC | Age: 69
End: 2024-05-17
Payer: MEDICARE

## 2024-05-17 ENCOUNTER — PATIENT MESSAGE (OUTPATIENT)
Dept: ADMINISTRATIVE | Facility: OTHER | Age: 69
End: 2024-05-17
Payer: MEDICARE

## 2024-05-17 NOTE — TELEPHONE ENCOUNTER
----- Message from Neisha Ford sent at 5/17/2024  9:53 AM CDT -----  Type:  Patient Returning Call    Who Called:pt      Who Left Message for Patient:Kristin    Does the patient know what this is regarding?:yes    Would the patient rather a call back or a response via [facility]? Call back    Best Call Back Number:[phone]      Additional Information:      Please call Back to advise. Thanks!

## 2024-05-17 NOTE — PLAN OF CARE
Patient discharged to home. Discharge instructions verbally given and hard copy provided to patient and family. Prescription delivered to bedside. Removed 20 g IV with cath tip in place. Patient tolerated IV removal well with bleeding controlled. Patient remains free of falls with no acute pain or distress noted. Patient left floor with R chest tube with pigtail in place. Patient left via transport.    There are no Wet Read(s) to document.

## 2024-05-18 ENCOUNTER — PATIENT MESSAGE (OUTPATIENT)
Dept: ADMINISTRATIVE | Facility: OTHER | Age: 69
End: 2024-05-18
Payer: MEDICARE

## 2024-05-20 ENCOUNTER — PATIENT MESSAGE (OUTPATIENT)
Dept: ADMINISTRATIVE | Facility: OTHER | Age: 69
End: 2024-05-20
Payer: MEDICARE

## 2024-05-21 DIAGNOSIS — Z94.4 LIVER REPLACED BY TRANSPLANT: Primary | ICD-10-CM

## 2024-05-21 DIAGNOSIS — Z85.05 PERSONAL HISTORY OF LIVER CANCER: ICD-10-CM
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
## 2024-05-22 ENCOUNTER — DOCUMENTATION ONLY (OUTPATIENT)
Dept: INFUSION THERAPY | Facility: HOSPITAL | Age: 69
End: 2024-05-22
Payer: MEDICARE

## 2024-05-22 ENCOUNTER — CLINICAL SUPPORT (OUTPATIENT)
Dept: AUDIOLOGY | Facility: CLINIC | Age: 69
End: 2024-05-22
Payer: MEDICARE

## 2024-05-22 ENCOUNTER — CLINICAL SUPPORT (OUTPATIENT)
Dept: HEMATOLOGY/ONCOLOGY | Facility: CLINIC | Age: 69
End: 2024-05-22
Payer: MEDICARE

## 2024-05-22 VITALS
HEIGHT: 66 IN | BODY MASS INDEX: 21.21 KG/M2 | TEMPERATURE: 98 F | SYSTOLIC BLOOD PRESSURE: 118 MMHG | OXYGEN SATURATION: 95 % | DIASTOLIC BLOOD PRESSURE: 68 MMHG | HEART RATE: 52 BPM | RESPIRATION RATE: 18 BRPM

## 2024-05-22 DIAGNOSIS — C34.91 ADENOCARCINOMA OF RIGHT LUNG: Primary | ICD-10-CM

## 2024-05-22 DIAGNOSIS — H90.3 BILATERAL HIGH FREQUENCY SENSORINEURAL HEARING LOSS: Primary | ICD-10-CM

## 2024-05-22 DIAGNOSIS — C34.91 ADENOCARCINOMA OF RIGHT LUNG: ICD-10-CM

## 2024-05-22 PROCEDURE — 92557 COMPREHENSIVE HEARING TEST: CPT | Mod: S$GLB,,, | Performed by: AUDIOLOGIST

## 2024-05-22 PROCEDURE — 99999 PR PBB SHADOW E&M-EST. PATIENT-LVL V: CPT | Mod: PBBFAC,,,

## 2024-05-22 PROCEDURE — 92567 TYMPANOMETRY: CPT | Mod: S$GLB,,, | Performed by: AUDIOLOGIST

## 2024-05-22 PROCEDURE — 99215 OFFICE O/P EST HI 40 MIN: CPT | Mod: S$GLB,,,

## 2024-05-22 RX ORDER — METHOCARBAMOL 750 MG/1
TABLET, FILM COATED ORAL
COMMUNITY

## 2024-05-22 NOTE — Clinical Note
"Dr. Mccray,   Please see the attached audiogram and notes from Mario Grier's visit to Audiology today.  I reviewed the recommendations with him and answered his questions.  If you do not have any concerns about him proceeding with hearing aids please document in his chart that he is "medically cleared for hearing aids" so that he can proceed with getting them when he is ready to do so. He is already scheduled to return on 7/16/24 for his next audiogram.  Thank you for referring him to Audiology.    Damaris Santamaria "

## 2024-05-22 NOTE — PROGRESS NOTES
Oncology Nutrition   New Patient Education  Mario Grier   1955    Nutrition Education   This is a 69 y.o.male with a medical diagnosis of adenocarcinoma of lung.     Met w/ pt to discuss current nutritional status and nutrition as it relates to cancer and cancer treatment. Pt currently moderate nutritional risk due to lack of appetite.   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 Currently drinking smoothies with protein powder added to boost intake. He states UBW: 131lbs.   S/p lobectomy and will begin chemotherapy in June. Denied tour of [facility]. Denied food insecurity. RD made him and spouse aware of food bank partnership if needed and/or qualifies.     Wt Readings from Last 10 Encounters:   05/16/24 59.6 kg (131 lb 6.3 oz)   05/03/24 59.9 kg (132 lb 0.9 oz)   04/22/24 61.7 kg (136 lb)   04/19/24 61.9 kg (136 lb 7.4 oz)   04/15/24 56.7 kg (125 lb)   04/04/24 56.7 kg (125 lb)   03/25/24 54.4 kg (120 lb)   03/19/24 54.6 kg (120 lb 5.9 oz)   02/28/24 59 kg (130 lb)   12/21/23 57.7 kg (127 lb 4.7 oz)      [x] PMHx reviewed  [x] Labs reviewed    Educated on food safety and common nutrition impact symptoms associated with chemotherapy treatment. Reinforced the importance of good hydration. Handouts provided.    Answered all nutrition related questions.     Patient provided with dietitian contact number and advised to call with questions or make future appointment if further intervention is needed. RD to follow throughout tx prn.    Yolanda Myles, ALEXANDER, LDN, CNSC  05/22/2024  4:36 PM          URI type symptoms requiring evaluation.

## 2024-05-22 NOTE — PROGRESS NOTES
"The patient is a 68 y/o diagnosed with skin and lung cancer. LCSW met with patient and his wife.   Met with pt for new pt education. Reviewed role of  during cancer treatment. Educated on role of SW on care team and resources available at the cancer center.  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 He stated that he "is not happy" with this. SW and RD acknowledged the difficulty of this situation and empathized. SW shared with the patient and his wife that if financial difficulties arise, we encourage them to reach out for assistance. Patient declined a tour of the infusion center.   Patient provided with social work contact number and advised to call with questions or make future appointment if further intervention is needed. SW to follow throughout tx.  "

## 2024-05-22 NOTE — PROGRESS NOTES
Mario Grier was seen 05/22/2024 for an audiological evaluation.      Pt is here for a pre-chemo audiogram.      Otoscopy revealed clear view of both external ear canals and tympanic membranes.  No obstructive cerumen present in either ear canal.       Audiogram results revealed a mild-to-moderate sensorineural hearing loss for the right ear and a mild-to-moderate sensorineural hearing loss for the left ear.  Speech Reception Thresholds were 15 dBHL for the right ear and 10 dBHL for the left ear.  Word recognition scores were good for the right ear and good for the left ear.   Tympanograms were Type A for the right ear and Type A for the left ear.    Audiogram results were reviewed in detail with patient and all questions were answered. Results will be forwarded to the referring provider at the completion of this note.     Rec. Routine audiograms during and after chemotherapy with annual audiograms thereafter to monitor hearing loss.  Also recommend binaural amplification pending medical clearance and hearing protection for all loud sounds when appropriate.

## 2024-05-25 ENCOUNTER — PATIENT MESSAGE (OUTPATIENT)
Dept: ADMINISTRATIVE | Facility: OTHER | Age: 69
End: 2024-05-25
Payer: MEDICARE

## 2024-05-28 ENCOUNTER — PATIENT MESSAGE (OUTPATIENT)
Dept: TRANSPLANT | Facility: CLINIC | Age: 69
End: 2024-05-28
Payer: MEDICARE

## 2024-05-30 ENCOUNTER — PATIENT MESSAGE (OUTPATIENT)
Dept: ADMINISTRATIVE | Facility: OTHER | Age: 69
End: 2024-05-30
Payer: MEDICARE

## 2024-05-31 ENCOUNTER — PATIENT MESSAGE (OUTPATIENT)
Dept: HEMATOLOGY/ONCOLOGY | Facility: CLINIC | Age: 69
End: 2024-05-31
Payer: MEDICARE

## 2024-05-31 DIAGNOSIS — C34.91 ADENOCARCINOMA OF RIGHT LUNG: ICD-10-CM

## 2024-06-03 ENCOUNTER — TELEPHONE (OUTPATIENT)
Dept: TRANSPLANT | Facility: CLINIC | Age: 69
End: 2024-06-03
Payer: MEDICARE

## 2024-06-03 RX ORDER — PROCHLORPERAZINE MALEATE 10 MG
10 TABLET ORAL EVERY 6 HOURS PRN
Qty: 60 TABLET | Refills: 6 | Status: SHIPPED | OUTPATIENT
Start: 2024-06-03 | End: 2025-06-03

## 2024-06-03 RX ORDER — OLANZAPINE 5 MG/1
TABLET ORAL
Qty: 30 TABLET | Refills: 0 | Status: SHIPPED | OUTPATIENT
Start: 2024-06-03

## 2024-06-03 NOTE — TELEPHONE ENCOUNTER
----- Message from Janet Mccray MD sent at 6/3/2024  9:55 AM CDT -----  Regarding: RE: DDI tacrolimus  Thank you everyone for your input  ----- Message -----  From: Hellen Moya, PharmD  Sent: 6/3/2024   9:39 AM CDT  To: Janet Mccray MD; Marybeth Ruiz RN; #  Subject: RE: DDI tacrolimus                               I don't think we need to delay starting chemo.    Because even if he is not a candidate for sirolimus, he will still need chemo and will stay on tacrolimus, we will just need to monitor renal function and tacrolimus levels closely.  His tacrolimus levels are currently pretty minimized as it is.  ----- Message -----  From: Marybeth Ruiz RN  Sent: 6/3/2024   9:20 AM CDT  To: Janet Mccray MD; Gaby Linder RN; #  Subject: FW: DDI tacrolimus                               PharmDs are you able to help, is it ok for him to start chemo on current regimen?  He will have [facility] labs tomorrow.  ----- Message -----  From: Marybeth Ruiz RN  Sent: 6/3/2024   9:18 AM CDT  To: Janet Mccray MD; Gaby Linder RN  Subject: RE: DDI tacrolimus                               Good morning, yeah a few messages back I let you know that the labs needed to start the switch were scheduled on 6/4. I asked if that needed to be sooner, but was not given a response to that.   I am not able to determine if he cannot start chemo on his current regimen.  ----- Message -----  From: Gaby Linder RN  Sent: 5/28/2024   9:08 AM CDT  To: Janet Mccray MD; Marybeth Ruiz RN  Subject: RE: DDI tacrolimus                               Where are we in the switch process?   We have him tentatively scheduled to start next week.  ----- Message -----  From: Marybeth Ruiz RN  Sent: 5/24/2024   8:29 AM CDT  To: Janet Mccray MD; Gaby Linder RN  Subject: RE: DDI tacrolimus                               I am sorry I am not able to answer that question. I just wanted to make sure everyone  knew how long the process took.  ----- Message -----  From: Janet Mccray MD  Sent: 5/23/2024   4:19 PM CDT  To: Mayrbeth Ruiz RN; Gaby Linder RN  Subject: RE: DDI tacrolimus                               He is currently scheduled to start chemo on 06/04/2024 do we need to delay it for this switch to happen  ----- Message -----  From: Marybeth Ruiz RN  Sent: 5/23/2024   1:27 PM CDT  To: Janet Mccray MD; Gaby Linder RN  Subject: RE: DDI tacrolimus                               Not yet, we would need to check his lipid panel and urine protein/creatinine ratio first.   We have a couple of criteria for these two lab tests to see if he would be a candidate for Sirolimus.   I had added it on to his labs on 6/4, do you need it sooner?     If he is ok to transition then we send to the pharmacy and transition him with a loading dose and then monitor with weekly labs. Sometimes it can take a few weeks after we start Sirolimus to get the level therapeutic and stop Tacrolimus.     Also one other thing about Sirolimus, he will not be able to have surgery on this med. One of the side effects is delayed wound healing, so if surgery is planned we would need to transition back to Tacrolimus.  ----- Message -----  From: Nida Cerrato MA  Sent: 5/23/2024   1:12 PM CDT  To: [facility]  Subject: FW: DDI tacrolimus                                 ----- Message -----  From: Gaby Linder, RN  Sent: 5/23/2024  12:10 PM CDT  To: Janet Mccray MD; Lizzy Fernandez Staff  Subject: RE: DDI tacrolimus                               They were trying to switch him over to this, if I am not mistaken  Let me verify with dr schroeder's team---  ----- Message -----  From: Janet Mccray MD  Sent: 5/23/2024  11:58 AM CDT  To: Gaby Linder RN  Subject: RE: DDI tacrolimus                               Is he on Sirolimus  ----- Message -----  From: Gaby Linder RN  Sent: 5/23/2024   9:44  AM CDT  To: Janet Mccray MD  Subject: RE: DDI tacrolimus                               He is approved.  ----- Message -----  From: Janet Mccray MD  Sent: 5/23/2024   9:28 AM CDT  To: Gaby Linder RN  Subject: FW: DDI tacrolimus                               Scheduled to start in 1st week of Lin but I do not see authorization can you double check and make sure it is done  ----- Message -----  From: Shawna Loco, PharmD  Sent: 5/23/2024   9:23 AM CDT  To: Janet Mccray MD  Subject: RE: DDI tacrolimus                               Yes ma'am, good from my perspective  ----- Message -----  From: Janet Mccray MD  Sent: 5/23/2024   9:14 AM CDT  To: Gaby Linder RN; Shawna Loco PharmD  Subject: RE: DDI tacrolimus                               That's great, are we all set then?  ----- Message -----  From: Shawna Loco, PharmD  Sent: 5/23/2024   6:50 AM CDT  To: Janet Mccray MD; Gaby Linder RN  Subject: RE: DDI tacrolimus                               I swapped Emend for Cinvanti 2/2 Emend having a lesser interaction with sirolimus compared to Cinvanti. Cinvanti is a moderate inhibitor of sirolimus metabolism and Emend is a weak inhibitor.  ----- Message -----  From: Gaby Linder RN  Sent: 5/22/2024  10:30 AM CDT  To: Shawna Loco, Wyatt  Subject: FW: DDI tacrolimus                                 ----- Message -----  From: Marybeth Ruiz RN  Sent: 5/21/2024   2:05 PM CDT  To: Jim Ball MD; Janet Mccray MD; #  Subject: RE: DDI tacrolimus                               Sure,  I will add the lipid and urine needed to his next labs.  ----- Message -----  From: Jim Ball MD  Sent: 5/21/2024   2:00 PM CDT  To: Janet Mccray MD; Marybeth Ruiz RN; #  Subject: RE: DDI tacrolimus                               Marybeth, can we try switching him to sirolimus?  ----- Message -----  From: Yolanda GONZALEZ, PharmD  Sent: 5/20/2024  12:03 PM CDT  To: Jim  MD Lizzy; Janet Mccray MD; #  Subject: RE: DDI tacrolimus                               Since he is 11 years out, his Prograf goal should be on the lower end where nephrotoxicity isn't as big of a concern.  However, with adding cisplatin I agree with more frequent monitoring and decreasing the dose/ goal as needed.      We can also consider switching Prograf to sirolimus to avoid any concern for nephrotoxicity, assuming his cholesterol is normal and he has no proteinuria. WBC is high enough now to tolerate sirolimus.  ----- Message -----  From: Marybeth Ruiz RN  Sent: 2024  10:10 AM CDT  To: Jim Ball MD; Janet Mccray MD; #  Subject: RE: DDI tacrolimus                               Dr. Ball and Pharmacy group, could you give your recommendations? Thank you  ----- Message -----  From: Janet Mccray MD  Sent: 2024   9:59 AM CDT  To: Marybeth Ruiz RN; Gaby Linder, RN; #  Subject: RE: DDI tacrolimus                               I am attaching Marybeth with transplant to see if she can reach out to his doctor with the Department  ----- Message -----  From: Gaby Linder, GAEL  Sent: 2024   9:40 AM CDT  To: Janet Mccray MD; Shawna Loco PharmD  Subject: RE: DDI tacrolimus                               Oh noooo  ----- Message -----  From: Shawna Loco, Wyatt  Sent: 2024   9:36 AM CDT  To: Janet Mccray MD; Gaby Linder, RN  Subject: DDI tacrolimus                                   Hi Dr. Mccray,    There is a Category C drug-drug interaction between patient's home tacrolimus and cisplatin/dexamethasone.    Explanation: cisplatin can increase tacrolimus (renal toxicity) and dexamethasone can decrease tacrolimus levels (CY induction)    Final recommendation: Temporary increased monitoring of tacrolimus during chemotherapy, especially in the setting of renal toxicity.    Marilin Loco, ElizabethD

## 2024-06-04 ENCOUNTER — LAB VISIT (OUTPATIENT)
Dept: LAB | Facility: HOSPITAL | Age: 69
End: 2024-06-04
Attending: INTERNAL MEDICINE
Payer: MEDICARE

## 2024-06-04 ENCOUNTER — OFFICE VISIT (OUTPATIENT)
Dept: HEMATOLOGY/ONCOLOGY | Facility: CLINIC | Age: 69
End: 2024-06-04
Payer: MEDICARE

## 2024-06-04 VITALS
TEMPERATURE: 98 F | SYSTOLIC BLOOD PRESSURE: 140 MMHG | HEART RATE: 60 BPM | HEIGHT: 66 IN | BODY MASS INDEX: 20.97 KG/M2 | WEIGHT: 130.5 LBS | OXYGEN SATURATION: 97 % | RESPIRATION RATE: 18 BRPM | DIASTOLIC BLOOD PRESSURE: 86 MMHG

## 2024-06-04 DIAGNOSIS — C34.91 ADENOCARCINOMA OF RIGHT LUNG: ICD-10-CM

## 2024-06-04 DIAGNOSIS — Z94.4 LIVER REPLACED BY TRANSPLANT: ICD-10-CM

## 2024-06-04 DIAGNOSIS — T45.1X5A IMMUNODEFICIENCY DUE TO CHEMOTHERAPY: ICD-10-CM

## 2024-06-04 DIAGNOSIS — Z85.05 PERSONAL HISTORY OF LIVER CANCER: ICD-10-CM

## 2024-06-04 DIAGNOSIS — Z79.899 IMMUNODEFICIENCY DUE TO CHEMOTHERAPY: ICD-10-CM

## 2024-06-04 DIAGNOSIS — D84.821 IMMUNODEFICIENCY DUE TO CHEMOTHERAPY: ICD-10-CM

## 2024-06-04 DIAGNOSIS — C34.91 ADENOCARCINOMA OF RIGHT LUNG: Primary | ICD-10-CM

## 2024-06-04 LAB
ALBUMIN SERPL BCP-MCNC: 3.7 G/DL (ref 3.5–5.2)
ALP SERPL-CCNC: 117 U/L (ref 55–135)
ALT SERPL W/O P-5'-P-CCNC: 39 U/L (ref 10–44)
ANION GAP SERPL CALC-SCNC: 10 MMOL/L (ref 8–16)
AST SERPL-CCNC: 32 U/L (ref 10–40)
BASOPHILS # BLD AUTO: 0.04 K/UL (ref 0–0.2)
BASOPHILS NFR BLD: 0.5 % (ref 0–1.9)
BILIRUB SERPL-MCNC: 0.6 MG/DL (ref 0.1–1)
BUN SERPL-MCNC: 23 MG/DL (ref 8–23)
CALCIUM SERPL-MCNC: 9.3 MG/DL (ref 8.7–10.5)
CHLORIDE SERPL-SCNC: 104 MMOL/L (ref 95–110)
CHOLEST SERPL-MCNC: 114 MG/DL (ref 120–199)
CHOLEST/HDLC SERPL: 2.6 {RATIO} (ref 2–5)
CO2 SERPL-SCNC: 24 MMOL/L (ref 23–29)
CREAT SERPL-MCNC: 1.2 MG/DL (ref 0.5–1.4)
CREAT UR-MCNC: 125 MG/DL (ref 23–375)
DIFFERENTIAL METHOD BLD: ABNORMAL
EOSINOPHIL # BLD AUTO: 0.5 K/UL (ref 0–0.5)
EOSINOPHIL NFR BLD: 6.6 % (ref 0–8)
ERYTHROCYTE [DISTWIDTH] IN BLOOD BY AUTOMATED COUNT: 12 % (ref 11.5–14.5)
EST. GFR  (NO RACE VARIABLE): >60 ML/MIN/1.73 M^2
GLUCOSE SERPL-MCNC: 110 MG/DL (ref 70–110)
HCT VFR BLD AUTO: 37.9 % (ref 40–54)
HDLC SERPL-MCNC: 44 MG/DL (ref 40–75)
HDLC SERPL: 38.6 % (ref 20–50)
HGB BLD-MCNC: 13.1 G/DL (ref 14–18)
IMM GRANULOCYTES # BLD AUTO: 0.01 K/UL (ref 0–0.04)
IMM GRANULOCYTES NFR BLD AUTO: 0.1 % (ref 0–0.5)
LDLC SERPL CALC-MCNC: 58.8 MG/DL (ref 63–159)
LYMPHOCYTES # BLD AUTO: 1.6 K/UL (ref 1–4.8)
LYMPHOCYTES NFR BLD: 19.2 % (ref 18–48)
MAGNESIUM SERPL-MCNC: 1.8 MG/DL (ref 1.6–2.6)
MCH RBC QN AUTO: 33.9 PG (ref 27–31)
MCHC RBC AUTO-ENTMCNC: 34.6 G/DL (ref 32–36)
MCV RBC AUTO: 98 FL (ref 82–98)
MONOCYTES # BLD AUTO: 0.7 K/UL (ref 0.3–1)
MONOCYTES NFR BLD: 8.2 % (ref 4–15)
NEUTROPHILS # BLD AUTO: 5.3 K/UL (ref 1.8–7.7)
NEUTROPHILS NFR BLD: 65.4 % (ref 38–73)
NONHDLC SERPL-MCNC: 70 MG/DL
NRBC BLD-RTO: 0 /100 WBC
PHOSPHATE SERPL-MCNC: 3 MG/DL (ref 2.7–4.5)
PLATELET # BLD AUTO: 204 K/UL (ref 150–450)
PMV BLD AUTO: 10.9 FL (ref 9.2–12.9)
POTASSIUM SERPL-SCNC: 4.8 MMOL/L (ref 3.5–5.1)
PROT SERPL-MCNC: 7 G/DL (ref 6–8.4)
PROT UR-MCNC: 21 MG/DL (ref 0–15)
PROT/CREAT UR: 0.17 MG/G{CREAT} (ref 0–0.2)
RBC # BLD AUTO: 3.87 M/UL (ref 4.6–6.2)
SODIUM SERPL-SCNC: 138 MMOL/L (ref 136–145)
TRIGL SERPL-MCNC: 56 MG/DL (ref 30–150)
WBC # BLD AUTO: 8.08 K/UL (ref 3.9–12.7)

## 2024-06-04 PROCEDURE — 85025 COMPLETE CBC W/AUTO DIFF WBC: CPT | Mod: PN | Performed by: INTERNAL MEDICINE

## 2024-06-04 PROCEDURE — 99999 PR PBB SHADOW E&M-EST. PATIENT-LVL IV: CPT | Mod: PBBFAC,,, | Performed by: INTERNAL MEDICINE

## 2024-06-04 PROCEDURE — 80053 COMPREHEN METABOLIC PANEL: CPT | Mod: PN | Performed by: INTERNAL MEDICINE

## 2024-06-04 PROCEDURE — 99215 OFFICE O/P EST HI 40 MIN: CPT | Mod: S$GLB,,, | Performed by: INTERNAL MEDICINE

## 2024-06-04 PROCEDURE — 36415 COLL VENOUS BLD VENIPUNCTURE: CPT | Mod: PN | Performed by: INTERNAL MEDICINE

## 2024-06-04 PROCEDURE — 1126F AMNT PAIN NOTED NONE PRSNT: CPT | Mod: CPTII,S$GLB,, | Performed by: INTERNAL MEDICINE

## 2024-06-04 PROCEDURE — 1157F ADVNC CARE PLAN IN RCRD: CPT | Mod: CPTII,S$GLB,, | Performed by: INTERNAL MEDICINE

## 2024-06-04 PROCEDURE — 3077F SYST BP >= 140 MM HG: CPT | Mod: CPTII,S$GLB,, | Performed by: INTERNAL MEDICINE

## 2024-06-04 PROCEDURE — 82570 ASSAY OF URINE CREATININE: CPT | Performed by: INTERNAL MEDICINE

## 2024-06-04 PROCEDURE — 3288F FALL RISK ASSESSMENT DOCD: CPT | Mod: CPTII,S$GLB,, | Performed by: INTERNAL MEDICINE

## 2024-06-04 PROCEDURE — 84100 ASSAY OF PHOSPHORUS: CPT | Mod: PN | Performed by: INTERNAL MEDICINE

## 2024-06-04 PROCEDURE — 1101F PT FALLS ASSESS-DOCD LE1/YR: CPT | Mod: CPTII,S$GLB,, | Performed by: INTERNAL MEDICINE

## 2024-06-04 PROCEDURE — G2211 COMPLEX E/M VISIT ADD ON: HCPCS | Mod: S$GLB,,, | Performed by: INTERNAL MEDICINE

## 2024-06-04 PROCEDURE — 1159F MED LIST DOCD IN RCRD: CPT | Mod: CPTII,S$GLB,, | Performed by: INTERNAL MEDICINE

## 2024-06-04 PROCEDURE — 3079F DIAST BP 80-89 MM HG: CPT | Mod: CPTII,S$GLB,, | Performed by: INTERNAL MEDICINE

## 2024-06-04 PROCEDURE — 80061 LIPID PANEL: CPT | Performed by: INTERNAL MEDICINE

## 2024-06-04 PROCEDURE — 83735 ASSAY OF MAGNESIUM: CPT | Mod: PN | Performed by: INTERNAL MEDICINE

## 2024-06-04 PROCEDURE — 3008F BODY MASS INDEX DOCD: CPT | Mod: CPTII,S$GLB,, | Performed by: INTERNAL MEDICINE

## 2024-06-04 RX ORDER — HEPARIN 100 UNIT/ML
500 SYRINGE INTRAVENOUS
Status: CANCELLED | OUTPATIENT
Start: 2024-06-10

## 2024-06-04 RX ORDER — SODIUM CHLORIDE 0.9 % (FLUSH) 0.9 %
10 SYRINGE (ML) INJECTION
Status: CANCELLED | OUTPATIENT
Start: 2024-06-10

## 2024-06-04 RX ORDER — KETOCONAZOLE 20 MG/G
CREAM TOPICAL 2 TIMES DAILY PRN
COMMUNITY
Start: 2024-05-23

## 2024-06-04 RX ORDER — PROCHLORPERAZINE EDISYLATE 5 MG/ML
5 INJECTION INTRAMUSCULAR; INTRAVENOUS ONCE AS NEEDED
Status: CANCELLED
Start: 2024-06-10

## 2024-06-04 RX ORDER — EPINEPHRINE 0.3 MG/.3ML
0.3 INJECTION SUBCUTANEOUS ONCE AS NEEDED
Status: CANCELLED | OUTPATIENT
Start: 2024-06-10

## 2024-06-04 RX ORDER — DIPHENHYDRAMINE HYDROCHLORIDE 50 MG/ML
50 INJECTION INTRAMUSCULAR; INTRAVENOUS ONCE AS NEEDED
Status: CANCELLED | OUTPATIENT
Start: 2024-06-10

## 2024-06-04 RX ORDER — CYANOCOBALAMIN 1000 UG/ML
1000 INJECTION, SOLUTION INTRAMUSCULAR; SUBCUTANEOUS
Status: CANCELLED | OUTPATIENT
Start: 2024-06-10

## 2024-06-04 NOTE — PROGRESS NOTES
Subjective     Patient ID: Mario Grier is a 69 y.o. male.    Chief Complaint: Liver fibrosis, transplanted liver and Lung Cancer  ONCOLOGIC HISTORY Mr. Grier is a 69 y.o. male smoker s/p OLTX 2013 (HCC), hyperthyroidism, BPH, and hx substance abuse with a new diagnosis of  RUL NSCLC.   Yearly CAP CT per transplant showed right upper lobe cavitary lesion.      PET 1/29/2024 2024: 2.2 cm spiculated, hypermetabolic cavitary right upper lobe pulmonary nodule, highly suspicious for malignancy. No evidence of FDG avid metastatic disease. Bilateral renal calculi and other incidental findings as above.     Percutaneous biopsy on 02/28/2024 positive for adenocarcinoma, lepidic type     On 04/22/2024 he underwent right upper robotic lobectomy.     Pathology reveals: Tumor Focality: Single focus  Tumor Site : upper lobe of lung:  Total Tumor Size :  4.2 cm  Histologic Type:  Invasive acinar adenocarcinoma  Histologic Grade :  G3, poorly differentiated  Visceral Pleura Invasion  :  Not identified  Direct Invasion of Adjacent Structures :   Not applicable (no adjacent structures present)  Treatment Effect :   No known presurgical therapy  Lymphovascular Invasion :   Present (not otherwise specified)  Margin Status for Invasive Carcinoma :  All margins negative for invasive carcinoma ; 3.5 cm to the vascular margin  Lymph Node(s) from Prior Procedures  : left 11, left 10, and 7--no carcinoma identified;  Included, but not in count  Regional Lymph Node Status :  All regional lymph nodes negative for tumor  Number of Lymph Nodes with Tumor : 0  Number of Lymph Nodes Examined  : likely 12 in this procedure  Young Site(s) Examined :  Right level 8, level 7, level 2, level 4, level 11, and level 12  Distant Site(s) Involved, if applicable  none known  PATHOLOGIC STAGE CLASSIFICATION (pTNM, AJCC 8th Edition):  pT2b pN0 pMX  Additional Findings:  There is a focal area of considerable emphysema with cavity formation      Case  "reviewed in thoracic multidisciplinary tumor board on 05/08/2024 and recommendations include "Referral to Med/Onc for discussion of adjuvant systemic therapy for Stage IIB disease"       HPIHe comes in to start adjuvant chemo with Cisplatin and ALimta   He denies any nausea, vomiting, diarrhea, constipation, abdominal pain, weight loss or loss of appetite, chest pain, shortness of breath, leg swelling, fatigue, pain, headache, dizziness, or mood changes. His ECOG PS is zero. He is here by himself    Review of Systems   Constitutional:  Negative for appetite change, fatigue and unexpected weight change.   HENT:  Negative for mouth sores.    Eyes:  Negative for visual disturbance.   Respiratory:  Negative for cough and shortness of breath.    Cardiovascular:  Negative for chest pain.   Gastrointestinal:  Negative for abdominal pain and diarrhea.   Genitourinary:  Negative for frequency.   Musculoskeletal:  Negative for back pain.   Integumentary:  Negative for rash.   Neurological:  Negative for headaches.   Hematological:  Negative for adenopathy.   Psychiatric/Behavioral:  The patient is not nervous/anxious.    All other systems reviewed and are negative.         Objective     Physical Exam  Vitals reviewed.   Constitutional:       Appearance: He is well-developed.   HENT:      Mouth/Throat:      Pharynx: No oropharyngeal exudate.   Cardiovascular:      Rate and Rhythm: Normal rate.      Heart sounds: Normal heart sounds.   Pulmonary:      Effort: Pulmonary effort is normal.      Breath sounds: Normal breath sounds. No wheezing.   Abdominal:      General: Bowel sounds are normal.      Palpations: Abdomen is soft.      Tenderness: There is no abdominal tenderness.   Musculoskeletal:         General: No tenderness.   Lymphadenopathy:      Cervical: No cervical adenopathy.   Skin:     General: Skin is warm and dry.      Findings: No rash.   Neurological:      Mental Status: He is alert and oriented to person, place, " and time.      Coordination: Coordination normal.   Psychiatric:         Thought Content: Thought content normal.         Judgment: Judgment normal.              LABS:  WBC   Date Value Ref Range Status   06/04/2024 8.08 3.90 - 12.70 K/uL Final     Hemoglobin   Date Value Ref Range Status   06/04/2024 13.1 (L) 14.0 - 18.0 g/dL Final     POC Hematocrit   Date Value Ref Range Status   07/11/2013 32 (L) 36 - 54 %PCV Final     Hematocrit   Date Value Ref Range Status   06/04/2024 37.9 (L) 40.0 - 54.0 % Final     Platelets   Date Value Ref Range Status   06/04/2024 204 150 - 450 K/uL Final     Gran # (ANC)   Date Value Ref Range Status   06/04/2024 5.3 1.8 - 7.7 K/uL Final     Gran %   Date Value Ref Range Status   06/04/2024 65.4 38.0 - 73.0 % Final       Chemistry        Component Value Date/Time     06/04/2024 1116    K 4.8 06/04/2024 1116     06/04/2024 1116    CO2 24 06/04/2024 1116    BUN 23 06/04/2024 1116    CREATININE 1.2 06/04/2024 1116     06/04/2024 1116        Component Value Date/Time    CALCIUM 9.3 06/04/2024 1116    ALKPHOS 117 06/04/2024 1116    AST 32 06/04/2024 1116    ALT 39 06/04/2024 1116    BILITOT 0.6 06/04/2024 1116    ESTGFRAFRICA >60.0 05/31/2022 0918    EGFRNONAA 56.9 (A) 05/31/2022 0918          Assessment and Plan     1. Adenocarcinoma of right lung  -     CMP; Standing  -     CBC w/ DIFF; Standing  -     Magnesium; Standing  -     PHOSPHORUS; Standing    2. Immunodeficiency due to chemotherapy    3. Liver replaced by transplant  Overview:  HCV stage 2 grade 1        Route Chart for Scheduling    Med Onc Chart Routing      Follow up with physician . Chemo as scheduled on 6/10/2024, Schedule weekly CMP in [place] on 6/117, 6/24, 7/8, 7/15, 7/29, 8/5 and 8/12s. On 7/22 schedule CBC, CMP, Mag and phos in [place]. On 7/23 schedule to see me and for Cisplatin and Alimta in [place]. On 7/24 schedule IV fluids   Follow up with NICOLE . On 7/1/2024 schedule CBC.CMP, mag and phos in  [place],. ON 7/2 [facility] On [facility]3 schedule IV fluids   Infusion scheduling note    Injection scheduling note    Labs    Imaging    Pharmacy appointment    Other referrals                Treatment Plan Information   OP NSCLC PEMETREXED + CISPLATIN Q3W   Janet Mccray MD   Upcoming Treatment Dates - OP NSCLC PEMETREXED + CISPLATIN Q3W    5/30/2024       Chemotherapy       PEMEtrexed disodium (ALIMTA) 825 mg in sodium chloride 0.9% SolP 100 mL chemo infusion       CISplatin (Platinol) 75 mg/m2 = 125 mg in sodium chloride 0.9% 625 mL chemo infusion       Supportive Care       cyanocobalamin injection 1,000 mcg       Pre-Hydration       sodium chloride 0.9% 1,000 mL with magnesium sulfate 1 g, potassium chloride 20 mEq infusion       Post-Hydration       sodium chloride 0.9% 1,000 mL with magnesium sulfate 1 g, potassium chloride 20 mEq infusion       Antiemetics       palonosetron 0.25mg/dexAMETHasone 12mg in NS IVPB 0.25 mg 50 mL       fosaprepitant 150 mg in sodium chloride 0.9% 150 mL IVPB       Growth Factor       pegfilgrastim (NEULASTA (ON BODY INJECTOR)) injection 6 mg  6/20/2024       Chemotherapy       PEMEtrexed disodium (ALIMTA) 825 mg in sodium chloride 0.9% SolP 100 mL chemo infusion       CISplatin (Platinol) 75 mg/m2 = 125 mg in sodium chloride 0.9% 625 mL chemo infusion       Pre-Hydration       sodium chloride 0.9% 1,000 mL with magnesium sulfate 1 g, potassium chloride 20 mEq infusion       Post-Hydration       sodium chloride 0.9% 1,000 mL with magnesium sulfate 1 g, potassium chloride 20 mEq infusion       Antiemetics       palonosetron 0.25mg/dexAMETHasone 12mg in NS IVPB 0.25 mg 50 mL       fosaprepitant 150 mg in sodium chloride 0.9% 150 mL IVPB       Growth Factor       pegfilgrastim (NEULASTA (ON BODY INJECTOR)) injection 6 mg  7/11/2024       Chemotherapy       PEMEtrexed disodium (ALIMTA) 825 mg in sodium chloride 0.9% SolP 100 mL chemo  infusion       CISplatin (Platinol) 75 mg/m2 = 125 mg in sodium chloride 0.9% 625 mL chemo infusion       Pre-Hydration       sodium chloride 0.9% 1,000 mL with magnesium sulfate 1 g, potassium chloride 20 mEq infusion       Post-Hydration       sodium chloride 0.9% 1,000 mL with magnesium sulfate 1 g, potassium chloride 20 mEq infusion       Antiemetics       palonosetron 0.25mg/dexAMETHasone 12mg in NS IVPB 0.25 mg 50 mL       fosaprepitant 150 mg in sodium chloride 0.9% 150 mL IVPB       Growth Factor       pegfilgrastim (NEULASTA (ON BODY INJECTOR)) injection 6 mg  8/1/2024       Chemotherapy       PEMEtrexed disodium (ALIMTA) 825 mg in sodium chloride 0.9% SolP 100 mL chemo infusion       CISplatin (Platinol) 75 mg/m2 = 125 mg in sodium chloride 0.9% 625 mL chemo infusion       Supportive Care       cyanocobalamin injection 1,000 mcg       Pre-Hydration       sodium chloride 0.9% 1,000 mL with magnesium sulfate 1 g, potassium chloride 20 mEq infusion       Post-Hydration       sodium chloride 0.9% 1,000 mL with magnesium sulfate 1 g, potassium chloride 20 mEq infusion       Antiemetics       palonosetron 0.25mg/dexAMETHasone 12mg in NS IVPB 0.25 mg 50 mL       fosaprepitant 150 mg in sodium chloride 0.9% 150 mL IVPB       Growth Factor       pegfilgrastim (NEULASTA (ON BODY INJECTOR)) injection 6 mg      Mr. Grier comes in to start adjuvant chemo with Cisplatin and ALimta, will start on 6/10/2024, rediscussed appropriate use of antiemetics for dexamethasone Zyprexa Zofran and Compazine.  His Zyprexa is still not approved but it is at [facility] pharmacy for prior authorization process    He will return in 3 weeks for next cycle.     Liver transplant on immunosuppression:  Followed by transplant we will follow creatinine weekly while he is on combination of chemotherapy and immunosuppression     Visit today included increased complexity associated with the care of the episodic problem chemotherapy  addressed and managing the longitudinal care of the patient due to the serious and/or complex managed problem(s) lung cancer     Above care plan was discussed with patient and all questions were addressed to his satisfaction

## 2024-06-06 ENCOUNTER — PATIENT MESSAGE (OUTPATIENT)
Dept: TRANSPLANT | Facility: CLINIC | Age: 69
End: 2024-06-06
Payer: MEDICARE

## 2024-06-06 DIAGNOSIS — Z94.4 LIVER REPLACED BY TRANSPLANT: Primary | ICD-10-CM

## 2024-06-06 RX ORDER — SIROLIMUS 0.5 MG/1
0.5 TABLET, FILM COATED ORAL DAILY
Qty: 33 TABLET | Refills: 11 | Status: SHIPPED | OUTPATIENT
Start: 2024-06-06 | End: 2025-06-06

## 2024-06-06 NOTE — TELEPHONE ENCOUNTER
Attempted to call pt but no answer or voicemail.   Sent my chart message stating plan to change to Sirolimus. Will send script. Asked pt to contact office once he receives new med to go over transition.         ----- Message from Hellen Moya PharmD sent at 6/6/2024 10:18 AM CDT -----  Marybeth,     Normally, we do sirolimus 3mg x1, then 1mg daily.... but for him I want to start at a lower dose (his tacrolimus requirement is minimal on 0.5mg BID, he is 11 years post txp, )    I would send an rx for 0.5mg tabs and Sirolimus 1.5mg x1, then 0.5mg daily  ----- Message -----  From: Marybeth Ruiz RN  Sent: 6/6/2024   9:27 AM CDT  To: Jim Ball MD; #    Ok to transition to Rapa? Labs are WNL, he has started Chemo this week.     Pharm D's can you give recommendations on dosage please?  ----- Message -----  From: Jim Ball MD  Sent: 6/5/2024   9:48 AM CDT  To: [facility] Post-Liver Transplant Clinical    Results reviewed. No action.

## 2024-06-10 ENCOUNTER — DOCUMENTATION ONLY (OUTPATIENT)
Dept: INFUSION THERAPY | Facility: HOSPITAL | Age: 69
End: 2024-06-10
Payer: MEDICARE

## 2024-06-10 ENCOUNTER — DOCUMENTATION ONLY (OUTPATIENT)
Dept: HEMATOLOGY/ONCOLOGY | Facility: CLINIC | Age: 69
End: 2024-06-10
Payer: MEDICARE

## 2024-06-10 ENCOUNTER — INFUSION (OUTPATIENT)
Dept: INFUSION THERAPY | Facility: HOSPITAL | Age: 69
End: 2024-06-10
Attending: INTERNAL MEDICINE
Payer: MEDICARE

## 2024-06-10 VITALS
HEIGHT: 66 IN | DIASTOLIC BLOOD PRESSURE: 81 MMHG | BODY MASS INDEX: 20.65 KG/M2 | HEART RATE: 51 BPM | OXYGEN SATURATION: 97 % | SYSTOLIC BLOOD PRESSURE: 160 MMHG | RESPIRATION RATE: 16 BRPM | TEMPERATURE: 98 F | WEIGHT: 128.5 LBS

## 2024-06-10 DIAGNOSIS — C34.91 ADENOCARCINOMA OF RIGHT LUNG: Primary | ICD-10-CM

## 2024-06-10 PROCEDURE — 25000003 PHARM REV CODE 250: Mod: PN | Performed by: INTERNAL MEDICINE

## 2024-06-10 PROCEDURE — 63600175 PHARM REV CODE 636 W HCPCS: Mod: PN | Performed by: INTERNAL MEDICINE

## 2024-06-10 PROCEDURE — 96411 CHEMO IV PUSH ADDL DRUG: CPT | Mod: PN

## 2024-06-10 PROCEDURE — 96372 THER/PROPH/DIAG INJ SC/IM: CPT | Mod: 59,PN

## 2024-06-10 PROCEDURE — 96377 APPLICATON ON-BODY INJECTOR: CPT | Mod: 59,PN

## 2024-06-10 PROCEDURE — 96367 TX/PROPH/DG ADDL SEQ IV INF: CPT | Mod: PN

## 2024-06-10 PROCEDURE — 96413 CHEMO IV INFUSION 1 HR: CPT | Mod: PN

## 2024-06-10 PROCEDURE — 96366 THER/PROPH/DIAG IV INF ADDON: CPT | Mod: PN

## 2024-06-10 RX ORDER — CYANOCOBALAMIN 1000 UG/ML
1000 INJECTION, SOLUTION INTRAMUSCULAR; SUBCUTANEOUS
Status: COMPLETED | OUTPATIENT
Start: 2024-06-10 | End: 2024-06-10

## 2024-06-10 RX ORDER — SODIUM CHLORIDE 0.9 % (FLUSH) 0.9 %
10 SYRINGE (ML) INJECTION
Status: DISCONTINUED | OUTPATIENT
Start: 2024-06-10 | End: 2024-06-10 | Stop reason: HOSPADM

## 2024-06-10 RX ORDER — PROCHLORPERAZINE EDISYLATE 5 MG/ML
5 INJECTION INTRAMUSCULAR; INTRAVENOUS ONCE AS NEEDED
Status: DISCONTINUED | OUTPATIENT
Start: 2024-06-10 | End: 2024-06-10 | Stop reason: HOSPADM

## 2024-06-10 RX ORDER — EPINEPHRINE 0.3 MG/.3ML
0.3 INJECTION SUBCUTANEOUS ONCE AS NEEDED
Status: DISCONTINUED | OUTPATIENT
Start: 2024-06-10 | End: 2024-06-10 | Stop reason: HOSPADM

## 2024-06-10 RX ORDER — DIPHENHYDRAMINE HYDROCHLORIDE 50 MG/ML
50 INJECTION INTRAMUSCULAR; INTRAVENOUS ONCE AS NEEDED
Status: DISCONTINUED | OUTPATIENT
Start: 2024-06-10 | End: 2024-06-10 | Stop reason: HOSPADM

## 2024-06-10 RX ORDER — HEPARIN 100 UNIT/ML
500 SYRINGE INTRAVENOUS
Status: DISCONTINUED | OUTPATIENT
Start: 2024-06-10 | End: 2024-06-10 | Stop reason: HOSPADM

## 2024-06-10 RX ADMIN — POTASSIUM CHLORIDE 500 ML/HR: 2 INJECTION, SOLUTION, CONCENTRATE INTRAVENOUS at 01:06

## 2024-06-10 RX ADMIN — PEMETREXED DISODIUM 800 MG: 500 INJECTION, POWDER, LYOPHILIZED, FOR SOLUTION INTRAVENOUS at 12:06

## 2024-06-10 RX ADMIN — SODIUM CHLORIDE 125 MG: 9 INJECTION, SOLUTION INTRAVENOUS at 12:06

## 2024-06-10 RX ADMIN — DEXAMETHASONE SODIUM PHOSPHATE 0.25 MG: 10 INJECTION, SOLUTION INTRAMUSCULAR; INTRAVENOUS at 11:06

## 2024-06-10 RX ADMIN — POTASSIUM CHLORIDE 500 ML/HR: 2 INJECTION, SOLUTION, CONCENTRATE INTRAVENOUS at 09:06

## 2024-06-10 RX ADMIN — CYANOCOBALAMIN 1000 MCG: 1000 INJECTION, SOLUTION INTRAMUSCULAR at 03:06

## 2024-06-10 RX ADMIN — FOSAPREPITANT 150 MG: 150 INJECTION, POWDER, LYOPHILIZED, FOR SOLUTION INTRAVENOUS at 11:06

## 2024-06-10 RX ADMIN — PEGFILGRASTIM 6 MG: KIT SUBCUTANEOUS at 03:06

## 2024-06-10 NOTE — PROGRESS NOTES
Oncology Nutrition   Chemotherapy Infusion Visit    Nutrition Follow Up   Stopped by chairside with RAMU Bustos today for first infusion. Pt was sleeping and wife at chairside. She reports they have no questions about nutrition handouts and information received during chemo-school. She said appetite is good; no issues.   Will need to follow weight - loss of 2# since last visit. BMI: 20 -normal.   Discussed if any concerns such as taste changes, appetite change to call RD. She understood.     Wt Readings from Last 10 Encounters:   06/10/24 58.3 kg (128 lb 8.5 oz)   06/04/24 59.2 kg (130 lb 8.2 oz)   05/16/24 59.6 kg (131 lb 6.3 oz)   05/03/24 59.9 kg (132 lb 0.9 oz)   04/22/24 61.7 kg (136 lb)   04/19/24 61.9 kg (136 lb 7.4 oz)   04/15/24 56.7 kg (125 lb)   04/04/24 56.7 kg (125 lb)   03/25/24 54.4 kg (120 lb)   03/19/24 54.6 kg (120 lb 5.9 oz)     Will continue to monitor prn throughout treatment.     Yolanda Myles, ALEXANDER, LDN, [facility]  06/10/2024  10:27 AM

## 2024-06-10 NOTE — PROGRESS NOTES
BRIANAW went to visit patient in private infusion room. The patient was sleeping, so his wife shared that he has been doing well with everything going as planned. She stated that his appetite has been good. She denied any needs at this time.

## 2024-06-10 NOTE — PROGRESS NOTES
Met with patient in infusion during his first treatment. No navigational needs are noted. Future MD and infusions are already scheduled.  Will continue to monitor throughout treatment.

## 2024-06-10 NOTE — PLAN OF CARE
Problem: Adult Inpatient Plan of Care  Goal: Plan of Care Review  Outcome: Progressing  Flowsheets (Taken 6/10/2024 0943)  Plan of Care Reviewed With:   patient   spouse  Goal: Patient-Specific Goal (Individualized)  Outcome: Progressing  Flowsheets (Taken 6/10/2024 0943)  Anxieties, Fears or Concerns: First day of chemo  Individualized Care Needs: Recliner, warm blanket, coffee, wife at chairside, conversation, education  Goal: Optimal Comfort and Wellbeing  Outcome: Progressing  Intervention: Provide Person-Centered Care  Flowsheets (Taken 6/10/2024 0943)  Trust Relationship/Rapport:   care explained   questions answered   questions encouraged   empathic listening provided    Patient to Infusion for Alimta and Cisplatin, accompanied by her . Treatment plan reviewed with patient and wife. They verbalize understanding. VSS. Tolerated infusion. Provided with copy of upcoming appointment schedule. Escorted to the front lobby in no distress for discharge to home.

## 2024-06-11 ENCOUNTER — PATIENT MESSAGE (OUTPATIENT)
Dept: ADMINISTRATIVE | Facility: OTHER | Age: 69
End: 2024-06-11
Payer: MEDICARE

## 2024-06-11 ENCOUNTER — INFUSION (OUTPATIENT)
Dept: INFUSION THERAPY | Facility: HOSPITAL | Age: 69
End: 2024-06-11
Attending: INTERNAL MEDICINE
Payer: MEDICARE

## 2024-06-11 VITALS
BODY MASS INDEX: 20.65 KG/M2 | DIASTOLIC BLOOD PRESSURE: 67 MMHG | OXYGEN SATURATION: 98 % | WEIGHT: 128.5 LBS | HEIGHT: 66 IN | SYSTOLIC BLOOD PRESSURE: 137 MMHG | HEART RATE: 53 BPM | TEMPERATURE: 98 F | RESPIRATION RATE: 18 BRPM

## 2024-06-11 DIAGNOSIS — C34.91 ADENOCARCINOMA OF RIGHT LUNG: Primary | ICD-10-CM

## 2024-06-11 PROCEDURE — 96361 HYDRATE IV INFUSION ADD-ON: CPT | Mod: PN

## 2024-06-11 PROCEDURE — 96360 HYDRATION IV INFUSION INIT: CPT | Mod: PN

## 2024-06-11 PROCEDURE — 25000003 PHARM REV CODE 250: Mod: PN | Performed by: INTERNAL MEDICINE

## 2024-06-11 RX ADMIN — SODIUM CHLORIDE 1000 ML: 9 INJECTION, SOLUTION INTRAVENOUS at 09:06

## 2024-06-11 NOTE — PLAN OF CARE
Pt arrived to clinic today for IVF infusion and tolerated well. No changes throughout therapy. Pt aware of follow up appointments and side effects of drugs. Discharged to home. NAD.

## 2024-06-11 NOTE — PLAN OF CARE
Problem: Adult Inpatient Plan of Care  Goal: Plan of Care Review  Outcome: Progressing  Goal: Patient-Specific Goal (Individualized)  Outcome: Progressing  Goal: Absence of Hospital-Acquired Illness or Injury  Outcome: Progressing  Goal: Optimal Comfort and Wellbeing  Outcome: Progressing  Goal: Readiness for Transition of Care  Outcome: Progressing     Problem: Fatigue  Goal: Improved Activity Tolerance  Outcome: Progressing   Pt tolerated IV Hydration infusion well.   No adverse reaction noted.  PIV de-accessed per protocol.   Pt left clinic with wife in no acute distress.

## 2024-06-13 ENCOUNTER — PATIENT MESSAGE (OUTPATIENT)
Dept: ADMINISTRATIVE | Facility: OTHER | Age: 69
End: 2024-06-13
Payer: MEDICARE

## 2024-06-14 ENCOUNTER — TELEPHONE (OUTPATIENT)
Dept: TRANSPLANT | Facility: CLINIC | Age: 69
End: 2024-06-14
Payer: MEDICARE

## 2024-06-14 DIAGNOSIS — Z94.4 LIVER REPLACED BY TRANSPLANT: Primary | ICD-10-CM

## 2024-06-14 NOTE — TELEPHONE ENCOUNTER
Returned call to pt's wife. Re reviewed all dosing and instructions. She verbalizes understanding.       ----- Message from Charu Devine sent at 6/14/2024  2:22 PM CDT -----  Regarding: Patient advice  Contact: Lillian  [phone]            Name of Caller:    Lillian    Contact Preference:   [phone]    Nature of Call:  Requesting a call back have question about medication forgot direction given

## 2024-06-14 NOTE — TELEPHONE ENCOUNTER
Confirmed with pt he has received Sirolimus and has started , per pharmacy recommendation will continue Tacrolimus until Monday.   Will repeat labs Monday 6/17.       ----- Message from Hellen Moya PharmD sent at 6/6/2024 10:18 AM CDT -----  Marybeth,     Normally, we do sirolimus 3mg x1, then 1mg daily.... but for him I want to start at a lower dose (his tacrolimus requirement is minimal on 0.5mg BID, he is 11 years post txp, )    I would send an rx for 0.5mg tabs and Sirolimus 1.5mg x1, then 0.5mg daily  ----- Message -----  From: Marybeth Ruiz RN  Sent: 6/6/2024   9:27 AM CDT  To: Jim Ball MD; #    Ok to transition to Rapa? Labs are WNL, he has started Chemo this week.     Pharm D's can you give recommendations on dosage please?  ----- Message -----  From: Jim Ball MD  Sent: 6/5/2024   9:48 AM CDT  To: [facility] Post-Liver Transplant Clinical    Results reviewed. No action.

## 2024-06-15 ENCOUNTER — NURSE TRIAGE (OUTPATIENT)
Dept: ADMINISTRATIVE | Facility: CLINIC | Age: 69
End: 2024-06-15
Payer: MEDICARE

## 2024-06-15 NOTE — TELEPHONE ENCOUNTER
No Contact/Triage. Unable to contact patient. Patient's mailbox is full and Triage RN unable to leave a message.       Reason for Disposition   Second attempt to contact caller AND no contact made. Phone number verified.    Additional Information   Negative: Caller has already spoken with the PCP (or office), and has no further questions   Negative: Caller has already spoken with another triager and has no further questions   Negative: Caller has already spoken with another triager or PCP (or office), and has further questions and triager able to answer questions.   Negative: Busy signal.  First attempt to contact caller.  Follow-up call scheduled within 15 minutes.   Negative: No answer.  First attempt to contact caller.  Follow-up call scheduled within 15 minutes.   Negative: Message left on identified voicemail   Negative: Message left on unidentified voice mail. Phone number verified.   Negative: Message left with person in household   Negative: Wrong number reached. Phone number verified.    Protocols used: No Contact or Duplicate Contact Call-A-[place]

## 2024-06-17 DIAGNOSIS — Z20.6 CONTACT WITH AND (SUSPECTED) EXPOSURE TO HUMAN IMMUNODEFICIENCY VIRUS (HIV): ICD-10-CM

## 2024-06-17 RX ORDER — EMTRICITABINE AND TENOFOVIR DISOPROXIL FUMARATE 200; 300 MG/1; MG/1
1 TABLET, FILM COATED ORAL DAILY
Qty: 30 TABLET | Refills: 11 | Status: SHIPPED | OUTPATIENT
Start: 2024-06-17

## 2024-06-19 ENCOUNTER — LAB VISIT (OUTPATIENT)
Dept: LAB | Facility: HOSPITAL | Age: 69
End: 2024-06-19
Payer: MEDICARE

## 2024-06-19 ENCOUNTER — NURSE TRIAGE (OUTPATIENT)
Dept: ADMINISTRATIVE | Facility: CLINIC | Age: 69
End: 2024-06-19
Payer: MEDICARE

## 2024-06-19 DIAGNOSIS — Z85.05 PERSONAL HISTORY OF LIVER CANCER: ICD-10-CM

## 2024-06-19 DIAGNOSIS — C34.91 ADENOCARCINOMA OF RIGHT LUNG: ICD-10-CM

## 2024-06-19 DIAGNOSIS — Z94.4 LIVER REPLACED BY TRANSPLANT: ICD-10-CM

## 2024-06-19 LAB
ALBUMIN SERPL BCP-MCNC: 3.4 G/DL (ref 3.5–5.2)
ALP SERPL-CCNC: 105 U/L (ref 55–135)
ALT SERPL W/O P-5'-P-CCNC: 83 U/L (ref 10–44)
ANION GAP SERPL CALC-SCNC: 6 MMOL/L (ref 8–16)
AST SERPL-CCNC: 27 U/L (ref 10–40)
BASOPHILS # BLD AUTO: ABNORMAL K/UL (ref 0–0.2)
BASOPHILS NFR BLD: 0 % (ref 0–1.9)
BILIRUB SERPL-MCNC: 0.6 MG/DL (ref 0.1–1)
BUN SERPL-MCNC: 17 MG/DL (ref 8–23)
CALCIUM SERPL-MCNC: 8.8 MG/DL (ref 8.7–10.5)
CHLORIDE SERPL-SCNC: 107 MMOL/L (ref 95–110)
CO2 SERPL-SCNC: 23 MMOL/L (ref 23–29)
CREAT SERPL-MCNC: 0.8 MG/DL (ref 0.5–1.4)
DIFFERENTIAL METHOD BLD: ABNORMAL
EOSINOPHIL # BLD AUTO: ABNORMAL K/UL (ref 0–0.5)
EOSINOPHIL NFR BLD: 11 % (ref 0–8)
ERYTHROCYTE [DISTWIDTH] IN BLOOD BY AUTOMATED COUNT: 12.2 % (ref 11.5–14.5)
EST. GFR  (NO RACE VARIABLE): >60 ML/MIN/1.73 M^2
GLUCOSE SERPL-MCNC: 96 MG/DL (ref 70–110)
HCT VFR BLD AUTO: 36.1 % (ref 40–54)
HGB BLD-MCNC: 12.2 G/DL (ref 14–18)
IMM GRANULOCYTES # BLD AUTO: ABNORMAL K/UL (ref 0–0.04)
IMM GRANULOCYTES NFR BLD AUTO: ABNORMAL % (ref 0–0.5)
INR PPP: 0.9 (ref 0.8–1.2)
LYMPHOCYTES # BLD AUTO: ABNORMAL K/UL (ref 1–4.8)
LYMPHOCYTES NFR BLD: 25 % (ref 18–48)
MAGNESIUM SERPL-MCNC: 1.8 MG/DL (ref 1.6–2.6)
MCH RBC QN AUTO: 33.4 PG (ref 27–31)
MCHC RBC AUTO-ENTMCNC: 33.8 G/DL (ref 32–36)
MCV RBC AUTO: 99 FL (ref 82–98)
METAMYELOCYTES NFR BLD MANUAL: 3 %
MONOCYTES # BLD AUTO: ABNORMAL K/UL (ref 0.3–1)
MONOCYTES NFR BLD: 5 % (ref 4–15)
NEUTROPHILS NFR BLD: 53 % (ref 38–73)
NEUTS BAND NFR BLD MANUAL: 3 %
NRBC BLD-RTO: 0 /100 WBC
PHOSPHATE SERPL-MCNC: 2.5 MG/DL (ref 2.7–4.5)
PLATELET # BLD AUTO: 43 K/UL (ref 150–450)
PLATELET BLD QL SMEAR: ABNORMAL
PMV BLD AUTO: 13.3 FL (ref 9.2–12.9)
POTASSIUM SERPL-SCNC: 4.9 MMOL/L (ref 3.5–5.1)
PROT SERPL-MCNC: 6.1 G/DL (ref 6–8.4)
PROTHROMBIN TIME: 10.7 SEC (ref 9–12.5)
RBC # BLD AUTO: 3.65 M/UL (ref 4.6–6.2)
SODIUM SERPL-SCNC: 136 MMOL/L (ref 136–145)
WBC # BLD AUTO: 3.52 K/UL (ref 3.9–12.7)

## 2024-06-19 PROCEDURE — 83735 ASSAY OF MAGNESIUM: CPT | Performed by: INTERNAL MEDICINE

## 2024-06-19 PROCEDURE — 80197 ASSAY OF TACROLIMUS: CPT | Performed by: INTERNAL MEDICINE

## 2024-06-19 PROCEDURE — 85610 PROTHROMBIN TIME: CPT | Performed by: INTERNAL MEDICINE

## 2024-06-19 PROCEDURE — 80195 ASSAY OF SIROLIMUS: CPT | Performed by: INTERNAL MEDICINE

## 2024-06-19 PROCEDURE — 36415 COLL VENOUS BLD VENIPUNCTURE: CPT | Mod: PO | Performed by: INTERNAL MEDICINE

## 2024-06-19 PROCEDURE — 84100 ASSAY OF PHOSPHORUS: CPT | Performed by: INTERNAL MEDICINE

## 2024-06-19 PROCEDURE — 80053 COMPREHEN METABOLIC PANEL: CPT | Performed by: INTERNAL MEDICINE

## 2024-06-19 PROCEDURE — 85007 BL SMEAR W/DIFF WBC COUNT: CPT | Mod: PO | Performed by: INTERNAL MEDICINE

## 2024-06-19 PROCEDURE — 85027 COMPLETE CBC AUTOMATED: CPT | Mod: PO | Performed by: INTERNAL MEDICINE

## 2024-06-20 ENCOUNTER — HOSPITAL ENCOUNTER (OUTPATIENT)
Dept: CARDIOLOGY | Facility: HOSPITAL | Age: 69
Discharge: HOME | End: 2024-06-20
Attending: INTERNAL MEDICINE
Payer: MEDICARE

## 2024-06-20 ENCOUNTER — DOCUMENTATION ONLY (OUTPATIENT)
Dept: INFUSION THERAPY | Facility: HOSPITAL | Age: 69
End: 2024-06-20
Payer: MEDICARE

## 2024-06-20 DIAGNOSIS — R09.89 RIGHT CAROTID BRUIT: ICD-10-CM

## 2024-06-20 LAB
LEFT ARM DIASTOLIC BLOOD PRESSURE: 86 MMHG
LEFT ARM SYSTOLIC BLOOD PRESSURE: 140 MMHG
LEFT CBA DIAS: 20 CM/S
LEFT CBA SYS: 65 CM/S
LEFT CCA DIST DIAS: 13 CM/S
LEFT CCA DIST SYS: 69 CM/S
LEFT CCA MID DIAS: 17 CM/S
LEFT CCA MID SYS: 110 CM/S
LEFT CCA PROX DIAS: 16 CM/S
LEFT CCA PROX SYS: 120 CM/S
LEFT ECA DIAS: 11 CM/S
LEFT ECA SYS: 70 CM/S
LEFT ICA DIST DIAS: 34 CM/S
LEFT ICA DIST SYS: 81 CM/S
LEFT ICA MID DIAS: 20 CM/S
LEFT ICA MID SYS: 62 CM/S
LEFT ICA PROX DIAS: 25 CM/S
LEFT ICA PROX SYS: 65 CM/S
LEFT VERTEBRAL DIAS: 14 CM/S
LEFT VERTEBRAL SYS: 57 CM/S
OHS CV CAROTID RIGHT ICA EDV HIGHEST: 31
OHS CV CAROTID ULTRASOUND LEFT ICA/CCA RATIO: 1.17
OHS CV CAROTID ULTRASOUND RIGHT ICA/CCA RATIO: 1.38
OHS CV PV CAROTID LEFT HIGHEST CCA: 120
OHS CV PV CAROTID LEFT HIGHEST ICA: 81
OHS CV PV CAROTID RIGHT HIGHEST CCA: 106
OHS CV PV CAROTID RIGHT HIGHEST ICA: 87
OHS CV US CAROTID LEFT HIGHEST EDV: 34
RIGHT ARM DIASTOLIC BLOOD PRESSURE: 86 MMHG
RIGHT ARM SYSTOLIC BLOOD PRESSURE: 140 MMHG
RIGHT CBA DIAS: 12 CM/S
RIGHT CBA SYS: 45 CM/S
RIGHT CCA DIST DIAS: 14 CM/S
RIGHT CCA DIST SYS: 63 CM/S
RIGHT CCA MID DIAS: 18 CM/S
RIGHT CCA MID SYS: 106 CM/S
RIGHT CCA PROX DIAS: 13 CM/S
RIGHT CCA PROX SYS: 99 CM/S
RIGHT ECA DIAS: 8 CM/S
RIGHT ECA SYS: 72 CM/S
RIGHT ICA DIST DIAS: 28 CM/S
RIGHT ICA DIST SYS: 87 CM/S
RIGHT ICA MID DIAS: 31 CM/S
RIGHT ICA MID SYS: 83 CM/S
RIGHT ICA PROX DIAS: 23 CM/S
RIGHT ICA PROX SYS: 62 CM/S
RIGHT VERTEBRAL DIAS: 15 CM/S
RIGHT VERTEBRAL SYS: 38 CM/S
SIROLIMUS BLD-MCNC: 2.1 NG/ML (ref 4–20)
TACROLIMUS BLD-MCNC: 5.5 NG/ML (ref 5–15)

## 2024-06-20 PROCEDURE — 93880 EXTRACRANIAL BILAT STUDY: CPT | Mod: 26,,, | Performed by: INTERNAL MEDICINE

## 2024-06-20 PROCEDURE — 93880 EXTRACRANIAL BILAT STUDY: CPT | Mod: PO

## 2024-06-20 NOTE — TELEPHONE ENCOUNTER
Pt has questions about pt taking benadryl s/p wasp sting. Pt is an liver transplant and hem/Onc pt. On call provider contacted, states that pt could take benadryl. Caller disconnected phone, called back x 1 NA. Called back x 2, information given to caller from provider, that pt could take OTC benadryl.  Pt advised per protocol and verbalized understanding.       Reason for Disposition   [1] Caller has URGENT medicine question about med that PCP or specialist prescribed AND [2] triager unable to answer question    Additional Information   Negative: [1] Intentional drug overdose AND [2] suicidal thoughts or ideas   Negative: MORE THAN A DOUBLE DOSE of a prescription or over-the-counter (OTC) drug   Negative: [1] DOUBLE DOSE (an extra dose or lesser amount) of prescription drug AND [2] any symptoms (e.g., dizziness, nausea, pain, sleepiness)   Negative: [1] DOUBLE DOSE (an extra dose or lesser amount) of over-the-counter (OTC) drug AND [2] any symptoms (e.g., dizziness, nausea, pain, sleepiness)   Negative: Took another person's prescription drug   Negative: [1] DOUBLE DOSE (an extra dose or lesser amount) of prescription drug AND [2] NO symptoms  (Exception: A double dose of antibiotics.)   Negative: Diabetes drug error or overdose (e.g., took wrong type of insulin or took extra dose)   Negative: [1] Prescription not at pharmacy AND [2] was prescribed by PCP recently (Exception: Triager has access to EMR and prescription is recorded there. Go to Home Care and confirm for pharmacy.)   Negative: [1] Pharmacy calling with prescription question AND [2] triager unable to answer question    Protocols used: Medication Question Call-A-

## 2024-06-20 NOTE — PROGRESS NOTES
Nutrition Note:    RD met with patient briefly d/t request from Hemalatha Alves MA in [place]. Pt states he started chemotherapy a few weeks ago and met GLENN Myles RD. Pt states he was told to ask RD about food assistance. RD explained TFP program to patient. Pt states he is currently receiving food from [employer] in [place] and it is right by his house. After discussion, pt feels he would prefer to stay with [employer] now but will let us know if that changes. Pt appreciative for RD time.         Initial Alt (Optional): 17

## 2024-06-21 ENCOUNTER — TELEPHONE (OUTPATIENT)
Dept: CARDIOLOGY | Facility: CLINIC | Age: 69
End: 2024-06-21
Payer: MEDICARE

## 2024-06-21 NOTE — TELEPHONE ENCOUNTER
----- Message from Reinier Prater MD sent at 6/20/2024  4:54 PM CDT -----  Mild plaque no stenosis

## 2024-06-24 ENCOUNTER — PATIENT MESSAGE (OUTPATIENT)
Dept: CARDIOLOGY | Facility: CLINIC | Age: 69
End: 2024-06-24
Payer: MEDICARE

## 2024-06-24 ENCOUNTER — PATIENT MESSAGE (OUTPATIENT)
Dept: HEMATOLOGY/ONCOLOGY | Facility: CLINIC | Age: 69
End: 2024-06-24
Payer: MEDICARE

## 2024-06-24 DIAGNOSIS — I10 HYPERTENSION, UNSPECIFIED TYPE: Primary | ICD-10-CM

## 2024-06-24 DIAGNOSIS — Z94.4 LIVER REPLACED BY TRANSPLANT: ICD-10-CM

## 2024-06-24 RX ORDER — SIROLIMUS 1 MG/1
1 TABLET, FILM COATED ORAL DAILY
Qty: 30 TABLET | Refills: 11 | Status: SHIPPED | OUTPATIENT
Start: 2024-06-24 | End: 2025-06-24

## 2024-06-24 RX ORDER — TACROLIMUS 0.5 MG/1
0.5 CAPSULE ORAL DAILY
Start: 2024-06-24

## 2024-06-24 NOTE — TELEPHONE ENCOUNTER
Informed pt of to med changes below.   Will repeat labs 7/2.         ----- Message from Hellen Moya PharmD sent at 6/24/2024 11:40 AM CDT -----  Let's do this:    Increase sirolimus from 0.5mg daily to 1mg daily.  (For Rx refills, can send 1mg tabs, can take two of the 0.5mg tabs that he has for now)    Decrease tacrolimus from 0.5mg BID to 0.5mg daily for 1 week, then stop.     Repeat sirolimus level ideally next week (before his infusion on 7/3) and if not, with labs on 7/8.  ----- Message -----  From: Marybeth Ruiz RN  Sent: 6/24/2024  11:15 AM CDT  To: Abdominal Transplant Pharmacists    Should we continue Tac since Sirolimus level is still low?  ----- Message -----  From: Jim Ball MD  Sent: 6/24/2024   8:17 AM CDT  To: [facility] Post-Liver Transplant Clinical    Results reviewed. No action.

## 2024-06-25 ENCOUNTER — PATIENT MESSAGE (OUTPATIENT)
Dept: ADMINISTRATIVE | Facility: OTHER | Age: 69
End: 2024-06-25
Payer: MEDICARE

## 2024-06-25 RX ORDER — OLMESARTAN MEDOXOMIL 5 MG/1
10 TABLET ORAL NIGHTLY
Qty: 60 TABLET | Refills: 2 | Status: SHIPPED | OUTPATIENT
Start: 2024-06-25 | End: 2025-06-25

## 2024-06-26 ENCOUNTER — PATIENT MESSAGE (OUTPATIENT)
Dept: ADMINISTRATIVE | Facility: OTHER | Age: 69
End: 2024-06-26
Payer: MEDICARE

## 2024-06-28 ENCOUNTER — PATIENT MESSAGE (OUTPATIENT)
Dept: HEMATOLOGY/ONCOLOGY | Facility: CLINIC | Age: 69
End: 2024-06-28
Payer: MEDICARE

## 2024-06-30 ENCOUNTER — PATIENT MESSAGE (OUTPATIENT)
Dept: ADMINISTRATIVE | Facility: OTHER | Age: 69
End: 2024-06-30
Payer: MEDICARE

## 2024-07-01 ENCOUNTER — TELEPHONE (OUTPATIENT)
Dept: HEMATOLOGY/ONCOLOGY | Facility: CLINIC | Age: 69
End: 2024-07-01
Payer: MEDICARE

## 2024-07-02 ENCOUNTER — LAB VISIT (OUTPATIENT)
Dept: LAB | Facility: HOSPITAL | Age: 69
End: 2024-07-02
Payer: MEDICARE

## 2024-07-02 ENCOUNTER — OFFICE VISIT (OUTPATIENT)
Dept: HEMATOLOGY/ONCOLOGY | Facility: CLINIC | Age: 69
End: 2024-07-02
Payer: MEDICARE

## 2024-07-02 VITALS — SYSTOLIC BLOOD PRESSURE: 142 MMHG | DIASTOLIC BLOOD PRESSURE: 76 MMHG

## 2024-07-02 DIAGNOSIS — T45.1X5A IMMUNODEFICIENCY DUE TO CHEMOTHERAPY: ICD-10-CM

## 2024-07-02 DIAGNOSIS — Z79.899 IMMUNODEFICIENCY DUE TO CHEMOTHERAPY: ICD-10-CM

## 2024-07-02 DIAGNOSIS — Z85.05 PERSONAL HISTORY OF LIVER CANCER: ICD-10-CM

## 2024-07-02 DIAGNOSIS — Z94.4 LIVER REPLACED BY TRANSPLANT: ICD-10-CM

## 2024-07-02 DIAGNOSIS — C34.91 ADENOCARCINOMA OF RIGHT LUNG: Primary | ICD-10-CM

## 2024-07-02 DIAGNOSIS — D84.821 IMMUNODEFICIENCY DUE TO CHEMOTHERAPY: ICD-10-CM

## 2024-07-02 DIAGNOSIS — K74.00 LIVER FIBROSIS, TRANSPLANTED LIVER: ICD-10-CM

## 2024-07-02 DIAGNOSIS — T86.49 LIVER FIBROSIS, TRANSPLANTED LIVER: ICD-10-CM

## 2024-07-02 LAB
ALBUMIN SERPL BCP-MCNC: 3.2 G/DL (ref 3.5–5.2)
ALP SERPL-CCNC: 165 U/L (ref 55–135)
ALT SERPL W/O P-5'-P-CCNC: 58 U/L (ref 10–44)
ANION GAP SERPL CALC-SCNC: 7 MMOL/L (ref 8–16)
AST SERPL-CCNC: 55 U/L (ref 10–40)
BASOPHILS # BLD AUTO: 0.04 K/UL (ref 0–0.2)
BASOPHILS NFR BLD: 0.9 % (ref 0–1.9)
BILIRUB SERPL-MCNC: 0.3 MG/DL (ref 0.1–1)
BUN SERPL-MCNC: 22 MG/DL (ref 8–23)
CALCIUM SERPL-MCNC: 8.7 MG/DL (ref 8.7–10.5)
CHLORIDE SERPL-SCNC: 106 MMOL/L (ref 95–110)
CO2 SERPL-SCNC: 23 MMOL/L (ref 23–29)
CREAT SERPL-MCNC: 0.8 MG/DL (ref 0.5–1.4)
DIFFERENTIAL METHOD BLD: ABNORMAL
EOSINOPHIL # BLD AUTO: 0.3 K/UL (ref 0–0.5)
EOSINOPHIL NFR BLD: 7.1 % (ref 0–8)
ERYTHROCYTE [DISTWIDTH] IN BLOOD BY AUTOMATED COUNT: 13 % (ref 11.5–14.5)
EST. GFR  (NO RACE VARIABLE): >60 ML/MIN/1.73 M^2
GLUCOSE SERPL-MCNC: 93 MG/DL (ref 70–110)
HCT VFR BLD AUTO: 36.4 % (ref 40–54)
HGB BLD-MCNC: 12.3 G/DL (ref 14–18)
IMM GRANULOCYTES # BLD AUTO: 0.01 K/UL (ref 0–0.04)
IMM GRANULOCYTES NFR BLD AUTO: 0.2 % (ref 0–0.5)
INR PPP: 0.9 (ref 0.8–1.2)
LYMPHOCYTES # BLD AUTO: 1.1 K/UL (ref 1–4.8)
LYMPHOCYTES NFR BLD: 24.4 % (ref 18–48)
MCH RBC QN AUTO: 34.2 PG (ref 27–31)
MCHC RBC AUTO-ENTMCNC: 33.8 G/DL (ref 32–36)
MCV RBC AUTO: 101 FL (ref 82–98)
MONOCYTES # BLD AUTO: 0.6 K/UL (ref 0.3–1)
MONOCYTES NFR BLD: 14 % (ref 4–15)
NEUTROPHILS # BLD AUTO: 2.4 K/UL (ref 1.8–7.7)
NEUTROPHILS NFR BLD: 53.4 % (ref 38–73)
NRBC BLD-RTO: 0 /100 WBC
PLATELET # BLD AUTO: 251 K/UL (ref 150–450)
PMV BLD AUTO: 11.2 FL (ref 9.2–12.9)
POTASSIUM SERPL-SCNC: 4.4 MMOL/L (ref 3.5–5.1)
PROT SERPL-MCNC: 6.3 G/DL (ref 6–8.4)
PROTHROMBIN TIME: 10.9 SEC (ref 9–12.5)
RBC # BLD AUTO: 3.6 M/UL (ref 4.6–6.2)
SODIUM SERPL-SCNC: 136 MMOL/L (ref 136–145)
WBC # BLD AUTO: 4.5 K/UL (ref 3.9–12.7)

## 2024-07-02 PROCEDURE — 80197 ASSAY OF TACROLIMUS: CPT | Performed by: INTERNAL MEDICINE

## 2024-07-02 PROCEDURE — 36415 COLL VENOUS BLD VENIPUNCTURE: CPT | Mod: PO | Performed by: INTERNAL MEDICINE

## 2024-07-02 PROCEDURE — 85610 PROTHROMBIN TIME: CPT | Mod: PO | Performed by: INTERNAL MEDICINE

## 2024-07-02 PROCEDURE — 80053 COMPREHEN METABOLIC PANEL: CPT | Mod: PO | Performed by: INTERNAL MEDICINE

## 2024-07-02 PROCEDURE — 80195 ASSAY OF SIROLIMUS: CPT | Performed by: INTERNAL MEDICINE

## 2024-07-02 PROCEDURE — 85025 COMPLETE CBC W/AUTO DIFF WBC: CPT | Performed by: INTERNAL MEDICINE
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
## 2024-07-03 ENCOUNTER — INFUSION (OUTPATIENT)
Dept: INFUSION THERAPY | Facility: HOSPITAL | Age: 69
End: 2024-07-03
Attending: INTERNAL MEDICINE
Payer: MEDICARE

## 2024-07-03 ENCOUNTER — DOCUMENTATION ONLY (OUTPATIENT)
Dept: INFUSION THERAPY | Facility: HOSPITAL | Age: 69
End: 2024-07-03
Payer: MEDICARE

## 2024-07-03 VITALS
WEIGHT: 134.06 LBS | OXYGEN SATURATION: 95 % | RESPIRATION RATE: 18 BRPM | DIASTOLIC BLOOD PRESSURE: 69 MMHG | HEART RATE: 55 BPM | HEIGHT: 66 IN | TEMPERATURE: 98 F | BODY MASS INDEX: 21.55 KG/M2 | SYSTOLIC BLOOD PRESSURE: 125 MMHG

## 2024-07-03 DIAGNOSIS — C34.91 ADENOCARCINOMA OF RIGHT LUNG: Primary | ICD-10-CM

## 2024-07-03 LAB
MAGNESIUM SERPL-MCNC: 2 MG/DL (ref 1.6–2.6)
PHOSPHATE SERPL-MCNC: 3.1 MG/DL (ref 2.7–4.5)
SIROLIMUS BLD-MCNC: 3.1 NG/ML (ref 4–20)
TACROLIMUS BLD-MCNC: <2 NG/ML (ref 5–15)

## 2024-07-03 PROCEDURE — 96377 APPLICATON ON-BODY INJECTOR: CPT | Mod: PN

## 2024-07-03 PROCEDURE — 96366 THER/PROPH/DIAG IV INF ADDON: CPT | Mod: PN

## 2024-07-03 PROCEDURE — 25000003 PHARM REV CODE 250: Mod: PN

## 2024-07-03 PROCEDURE — 83735 ASSAY OF MAGNESIUM: CPT | Mod: PN | Performed by: INTERNAL MEDICINE

## 2024-07-03 PROCEDURE — 96367 TX/PROPH/DG ADDL SEQ IV INF: CPT | Mod: PN

## 2024-07-03 PROCEDURE — 63600175 PHARM REV CODE 636 W HCPCS: Mod: PN

## 2024-07-03 PROCEDURE — 84100 ASSAY OF PHOSPHORUS: CPT | Mod: PN | Performed by: INTERNAL MEDICINE

## 2024-07-03 PROCEDURE — 96411 CHEMO IV PUSH ADDL DRUG: CPT | Mod: PN

## 2024-07-03 RX ORDER — SODIUM CHLORIDE 0.9 % (FLUSH) 0.9 %
10 SYRINGE (ML) INJECTION
Status: DISCONTINUED | OUTPATIENT
Start: 2024-07-03 | End: 2024-07-03 | Stop reason: HOSPADM

## 2024-07-03 RX ORDER — DIPHENHYDRAMINE HYDROCHLORIDE 50 MG/ML
50 INJECTION INTRAMUSCULAR; INTRAVENOUS ONCE AS NEEDED
Status: CANCELLED | OUTPATIENT
Start: 2024-07-03

## 2024-07-03 RX ORDER — DIPHENHYDRAMINE HYDROCHLORIDE 50 MG/ML
50 INJECTION INTRAMUSCULAR; INTRAVENOUS ONCE AS NEEDED
Status: DISCONTINUED | OUTPATIENT
Start: 2024-07-03 | End: 2024-07-03 | Stop reason: HOSPADM

## 2024-07-03 RX ORDER — HEPARIN 100 UNIT/ML
500 SYRINGE INTRAVENOUS
Status: CANCELLED | OUTPATIENT
Start: 2024-07-03

## 2024-07-03 RX ORDER — SODIUM CHLORIDE 0.9 % (FLUSH) 0.9 %
10 SYRINGE (ML) INJECTION
Status: CANCELLED | OUTPATIENT
Start: 2024-07-03

## 2024-07-03 RX ORDER — PROCHLORPERAZINE EDISYLATE 5 MG/ML
5 INJECTION INTRAMUSCULAR; INTRAVENOUS ONCE AS NEEDED
Status: CANCELLED
Start: 2024-07-03

## 2024-07-03 RX ORDER — PROCHLORPERAZINE EDISYLATE 5 MG/ML
5 INJECTION INTRAMUSCULAR; INTRAVENOUS ONCE AS NEEDED
Status: DISCONTINUED | OUTPATIENT
Start: 2024-07-03 | End: 2024-07-03 | Stop reason: HOSPADM

## 2024-07-03 RX ORDER — HEPARIN 100 UNIT/ML
500 SYRINGE INTRAVENOUS
Status: DISCONTINUED | OUTPATIENT
Start: 2024-07-03 | End: 2024-07-03 | Stop reason: HOSPADM

## 2024-07-03 RX ORDER — EPINEPHRINE 0.3 MG/.3ML
0.3 INJECTION SUBCUTANEOUS ONCE AS NEEDED
Status: DISCONTINUED | OUTPATIENT
Start: 2024-07-03 | End: 2024-07-03 | Stop reason: HOSPADM

## 2024-07-03 RX ORDER — EPINEPHRINE 0.3 MG/.3ML
0.3 INJECTION SUBCUTANEOUS ONCE AS NEEDED
Status: CANCELLED | OUTPATIENT
Start: 2024-07-03

## 2024-07-03 RX ADMIN — PEMETREXED DISODIUM 800 MG: 500 INJECTION, POWDER, LYOPHILIZED, FOR SOLUTION INTRAVENOUS at 12:07

## 2024-07-03 RX ADMIN — PEGFILGRASTIM 6 MG: KIT SUBCUTANEOUS at 02:07

## 2024-07-03 RX ADMIN — POTASSIUM CHLORIDE 500 ML/HR: 2 INJECTION, SOLUTION, CONCENTRATE INTRAVENOUS at 09:07

## 2024-07-03 RX ADMIN — SODIUM CHLORIDE 125 MG: 9 INJECTION, SOLUTION INTRAVENOUS at 12:07

## 2024-07-03 RX ADMIN — POTASSIUM CHLORIDE 500 ML/HR: 2 INJECTION, SOLUTION, CONCENTRATE INTRAVENOUS at 01:07

## 2024-07-03 RX ADMIN — FOSAPREPITANT 150 MG: 150 INJECTION, POWDER, LYOPHILIZED, FOR SOLUTION INTRAVENOUS at 11:07

## 2024-07-03 RX ADMIN — DEXAMETHASONE SODIUM PHOSPHATE 0.25 MG: 10 INJECTION, SOLUTION INTRAMUSCULAR; INTRAVENOUS at 11:07

## 2024-07-03 RX ADMIN — SODIUM CHLORIDE: 9 INJECTION, SOLUTION INTRAVENOUS at 11:07

## 2024-07-03 NOTE — PLAN OF CARE
Problem: Adult Inpatient Plan of Care  Goal: Plan of Care Review  Outcome: Progressing  Flowsheets (Taken 7/3/2024 0850)  Plan of Care Reviewed With:   patient   spouse  Goal: Patient-Specific Goal (Individualized)  Outcome: Progressing  Flowsheets (Taken 7/3/2024 0850)  Individualized Care Needs: recliner, blanket, dimmed lights, spouse chairside,  Anxieties, Fears or Concerns: need labs drawn     Problem: Fatigue  Goal: Improved Activity Tolerance  Outcome: Progressing  Intervention: Promote Improved Energy  Flowsheets (Taken 7/3/2024 0850)  Fatigue Management: paced activity encouraged  Sleep/Rest Enhancement:   natural light exposure provided   family presence promoted   noise level reduced   relaxation techniques promoted  Activity Management:   Ambulated -L4   Up in chair - L3  Environmental Support: calm environment promoted     Pt tolerated IVFs, alimta and cisplatin. Pt voiced no new complaints or concerns at this time. VSS, OBI applied to abd, pt aware of home care and when device will start administration. NAD noted. Pt d/c home, aware of next infusion.

## 2024-07-03 NOTE — PROGRESS NOTES
LCSW met with patient at the chairside during infusion. Patient denied any emotional or practical needs at this time. He reported feeling well enough to cut his grass, cook, and eat ordinary sized meals. He has not experienced any side effects as of yet and is hopeful that will not change. SW reminded the patient and his wife to reach out if any needs arise.

## 2024-07-04 ENCOUNTER — PATIENT MESSAGE (OUTPATIENT)
Dept: ADMINISTRATIVE | Facility: OTHER | Age: 69
End: 2024-07-04
Payer: MEDICARE

## 2024-07-05 ENCOUNTER — INFUSION (OUTPATIENT)
Dept: INFUSION THERAPY | Facility: HOSPITAL | Age: 69
End: 2024-07-05
Attending: INTERNAL MEDICINE
Payer: MEDICARE

## 2024-07-05 ENCOUNTER — TELEPHONE (OUTPATIENT)
Dept: TRANSPLANT | Facility: CLINIC | Age: 69
End: 2024-07-05
Payer: MEDICARE

## 2024-07-05 ENCOUNTER — TELEPHONE (OUTPATIENT)
Dept: HEMATOLOGY/ONCOLOGY | Facility: CLINIC | Age: 69
End: 2024-07-05
Payer: MEDICARE

## 2024-07-05 VITALS
DIASTOLIC BLOOD PRESSURE: 64 MMHG | SYSTOLIC BLOOD PRESSURE: 122 MMHG | OXYGEN SATURATION: 94 % | RESPIRATION RATE: 18 BRPM | BODY MASS INDEX: 22.18 KG/M2 | HEART RATE: 66 BPM | WEIGHT: 138 LBS | HEIGHT: 66 IN | TEMPERATURE: 98 F

## 2024-07-05 DIAGNOSIS — C34.91 ADENOCARCINOMA OF RIGHT LUNG: Primary | ICD-10-CM

## 2024-07-05 PROCEDURE — 25000003 PHARM REV CODE 250: Mod: PN

## 2024-07-05 PROCEDURE — 96360 HYDRATION IV INFUSION INIT: CPT | Mod: PN

## 2024-07-05 PROCEDURE — 96361 HYDRATE IV INFUSION ADD-ON: CPT | Mod: PN

## 2024-07-05 RX ORDER — POLYETHYLENE GLYCOL 3350 17 G/17G
17 POWDER, FOR SOLUTION ORAL DAILY
COMMUNITY

## 2024-07-05 RX ORDER — IBUPROFEN 100 MG/5ML
1000 SUSPENSION, ORAL (FINAL DOSE FORM) ORAL DAILY
COMMUNITY

## 2024-07-05 RX ORDER — MULTIVITAMIN
1 TABLET ORAL DAILY
COMMUNITY

## 2024-07-05 RX ADMIN — SODIUM CHLORIDE 1000 ML: 9 INJECTION, SOLUTION INTRAVENOUS at 03:07

## 2024-07-05 NOTE — TELEPHONE ENCOUNTER
I called patient to review [employer] financial assistance, currently noted to being inpatient, offered assistance to complete form - requested info be sent to [employer]. Noted and message sent with details, numbers and if he needed any assistance here to help. Patient grateful for the call and verbalized understanding, no further questions at this time.

## 2024-07-05 NOTE — TELEPHONE ENCOUNTER
Informed pt labs reviewed, no med changes needed. Pt now on Sirolimus alone. Will repeat labs 7/15.     ----- Message from Jim Ball MD sent at 7/5/2024  8:39 AM CDT -----  Results reviewed. No action.

## 2024-07-05 NOTE — NURSING
Pt arrived to infusion clinic for IVF's. Pt states gained 10lbs since yesterday. Pt weighed on same scale and gained 3lbs from yesterday.SYEDA Faustin notified. Pt doesn't have any SOB and lungs clear. Ok to proceed with fluids.  Pt tolerated well. Left unit NAD.

## 2024-07-06 ENCOUNTER — PATIENT MESSAGE (OUTPATIENT)
Dept: ADMINISTRATIVE | Facility: OTHER | Age: 69
End: 2024-07-06
Payer: MEDICARE

## 2024-07-09 ENCOUNTER — PATIENT MESSAGE (OUTPATIENT)
Dept: HEMATOLOGY/ONCOLOGY | Facility: CLINIC | Age: 69
End: 2024-07-09
Payer: MEDICARE

## 2024-07-10 ENCOUNTER — PATIENT MESSAGE (OUTPATIENT)
Dept: ADMINISTRATIVE | Facility: OTHER | Age: 69
End: 2024-07-10
Payer: MEDICARE

## 2024-07-10 ENCOUNTER — LAB VISIT (OUTPATIENT)
Dept: LAB | Facility: HOSPITAL | Age: 69
End: 2024-07-10
Attending: INTERNAL MEDICINE
Payer: MEDICARE

## 2024-07-10 ENCOUNTER — PATIENT MESSAGE (OUTPATIENT)
Dept: HEMATOLOGY/ONCOLOGY | Facility: CLINIC | Age: 69
End: 2024-07-10
Payer: MEDICARE

## 2024-07-10 DIAGNOSIS — C34.91 ADENOCARCINOMA OF RIGHT LUNG: ICD-10-CM

## 2024-07-10 DIAGNOSIS — Z94.4 LIVER REPLACED BY TRANSPLANT: ICD-10-CM

## 2024-07-10 DIAGNOSIS — Z85.05 PERSONAL HISTORY OF LIVER CANCER: ICD-10-CM

## 2024-07-10 LAB
ALBUMIN SERPL BCP-MCNC: 3.7 G/DL (ref 3.5–5.2)
ALP SERPL-CCNC: 142 U/L (ref 55–135)
ALT SERPL W/O P-5'-P-CCNC: 33 U/L (ref 10–44)
ANION GAP SERPL CALC-SCNC: 10 MMOL/L (ref 8–16)
AST SERPL-CCNC: 27 U/L (ref 10–40)
BASOPHILS # BLD AUTO: 0.1 K/UL (ref 0–0.2)
BASOPHILS NFR BLD: 0.7 % (ref 0–1.9)
BILIRUB SERPL-MCNC: 1 MG/DL (ref 0.1–1)
BUN SERPL-MCNC: 51 MG/DL (ref 8–23)
CALCIUM SERPL-MCNC: 8.9 MG/DL (ref 8.7–10.5)
CHLORIDE SERPL-SCNC: 99 MMOL/L (ref 95–110)
CO2 SERPL-SCNC: 27 MMOL/L (ref 23–29)
CREAT SERPL-MCNC: 1.5 MG/DL (ref 0.5–1.4)
DIFFERENTIAL METHOD BLD: ABNORMAL
EOSINOPHIL # BLD AUTO: 0.1 K/UL (ref 0–0.5)
EOSINOPHIL NFR BLD: 0.5 % (ref 0–8)
ERYTHROCYTE [DISTWIDTH] IN BLOOD BY AUTOMATED COUNT: 13.2 % (ref 11.5–14.5)
EST. GFR  (NO RACE VARIABLE): 50.4 ML/MIN/1.73 M^2
GLUCOSE SERPL-MCNC: 105 MG/DL (ref 70–110)
HCT VFR BLD AUTO: 41.6 % (ref 40–54)
HGB BLD-MCNC: 13.8 G/DL (ref 14–18)
IMM GRANULOCYTES # BLD AUTO: 0.12 K/UL (ref 0–0.04)
IMM GRANULOCYTES NFR BLD AUTO: 0.8 % (ref 0–0.5)
LYMPHOCYTES # BLD AUTO: 1.8 K/UL (ref 1–4.8)
LYMPHOCYTES NFR BLD: 12.5 % (ref 18–48)
MCH RBC QN AUTO: 32.3 PG (ref 27–31)
MCHC RBC AUTO-ENTMCNC: 33.2 G/DL (ref 32–36)
MCV RBC AUTO: 97 FL (ref 82–98)
MONOCYTES # BLD AUTO: 1.5 K/UL (ref 0.3–1)
MONOCYTES NFR BLD: 10.2 % (ref 4–15)
NEUTROPHILS # BLD AUTO: 11.1 K/UL (ref 1.8–7.7)
NEUTROPHILS NFR BLD: 76.1 % (ref 38–73)
NRBC BLD-RTO: 0 /100 WBC
PLATELET # BLD AUTO: 99 K/UL (ref 150–450)
PLATELET BLD QL SMEAR: ABNORMAL
PMV BLD AUTO: 12.3 FL (ref 9.2–12.9)
POTASSIUM SERPL-SCNC: 4 MMOL/L (ref 3.5–5.1)
PROT SERPL-MCNC: 7.1 G/DL (ref 6–8.4)
RBC # BLD AUTO: 4.27 M/UL (ref 4.6–6.2)
SODIUM SERPL-SCNC: 136 MMOL/L (ref 136–145)
WBC # BLD AUTO: 14.57 K/UL (ref 3.9–12.7)

## 2024-07-10 PROCEDURE — 80195 ASSAY OF SIROLIMUS: CPT | Performed by: INTERNAL MEDICINE

## 2024-07-10 PROCEDURE — 80053 COMPREHEN METABOLIC PANEL: CPT | Performed by: INTERNAL MEDICINE

## 2024-07-10 PROCEDURE — 85025 COMPLETE CBC W/AUTO DIFF WBC: CPT | Mod: PO | Performed by: INTERNAL MEDICINE

## 2024-07-10 PROCEDURE — 36415 COLL VENOUS BLD VENIPUNCTURE: CPT | Mod: PO | Performed by: INTERNAL MEDICINE

## 2024-07-11 ENCOUNTER — PATIENT MESSAGE (OUTPATIENT)
Dept: ADMINISTRATIVE | Facility: OTHER | Age: 69
End: 2024-07-11
Payer: MEDICARE

## 2024-07-11 ENCOUNTER — TELEPHONE (OUTPATIENT)
Dept: HEMATOLOGY/ONCOLOGY | Facility: CLINIC | Age: 69
End: 2024-07-11
Payer: MEDICARE

## 2024-07-11 ENCOUNTER — TELEPHONE (OUTPATIENT)
Dept: TRANSPLANT | Facility: CLINIC | Age: 69
End: 2024-07-11
Payer: MEDICARE

## 2024-07-11 LAB — SIROLIMUS BLD-MCNC: 3.9 NG/ML (ref 4–20)

## 2024-07-11 NOTE — TELEPHONE ENCOUNTER
He had a CMP yesterday creatinine was up at 1.5, needs to hydrate   We can do IV fluids if he wants to come in

## 2024-07-11 NOTE — TELEPHONE ENCOUNTER
I called patient back when received [employer] message. Unable to reach patient or leave vmail -full. Leaving follow up message via [employer] in response to assistance or questions regarding [employer].

## 2024-07-11 NOTE — TELEPHONE ENCOUNTER
----- Message from Charu Devine sent at 7/11/2024  8:23 AM CDT -----  Regarding: Patient advice  Contact: Pt  [phone]          Caller:   Mario      Returning call to: Unsure no name left      Caller can be reached at:  [phone]     Nature of the call:  Returning missed call regarding to weight lost  pt states he is gaining weight back

## 2024-07-11 NOTE — TELEPHONE ENCOUNTER
Labs reviewed by Dr. Ball, LFTs are stable, no med changes needed. Pt's creatinine being managed per oncology. Will repeat labs in 1 month. Pt notified.         ----- Message from Jim Ball MD sent at 7/11/2024 10:22 AM CDT -----  Results reviewed. Anything going on with him. I see his sCr jumped up.

## 2024-07-12 ENCOUNTER — TELEPHONE (OUTPATIENT)
Dept: HEMATOLOGY/ONCOLOGY | Facility: CLINIC | Age: 69
End: 2024-07-12
Payer: MEDICARE

## 2024-07-12 ENCOUNTER — PATIENT MESSAGE (OUTPATIENT)
Dept: ADMINISTRATIVE | Facility: OTHER | Age: 69
End: 2024-07-12
Payer: MEDICARE

## 2024-07-12 NOTE — TELEPHONE ENCOUNTER
Chemo care alerted of patient increase in temperature. His temperature was 99.5. He denies any weakness, no chills, and states he is staying hydrated. Dr. Mccray aware of this. Will continue to monitor temperature. Told patient to call the office back if temperature is over 100.4 or develops chills.

## 2024-07-13 ENCOUNTER — PATIENT MESSAGE (OUTPATIENT)
Dept: ADMINISTRATIVE | Facility: OTHER | Age: 69
End: 2024-07-13
Payer: MEDICARE

## 2024-07-14 ENCOUNTER — PATIENT MESSAGE (OUTPATIENT)
Dept: ADMINISTRATIVE | Facility: OTHER | Age: 69
End: 2024-07-14
Payer: MEDICARE

## 2024-07-17 ENCOUNTER — OFFICE VISIT (OUTPATIENT)
Dept: HEMATOLOGY/ONCOLOGY | Facility: CLINIC | Age: 69
End: 2024-07-17
Payer: MEDICARE

## 2024-07-17 VITALS
BODY MASS INDEX: 21.69 KG/M2 | DIASTOLIC BLOOD PRESSURE: 59 MMHG | SYSTOLIC BLOOD PRESSURE: 100 MMHG | TEMPERATURE: 98 F | OXYGEN SATURATION: 96 % | RESPIRATION RATE: 18 BRPM | WEIGHT: 134.94 LBS | HEIGHT: 66 IN | HEART RATE: 61 BPM

## 2024-07-17 DIAGNOSIS — J02.9 PHARYNGITIS, UNSPECIFIED ETIOLOGY: ICD-10-CM

## 2024-07-17 DIAGNOSIS — I80.9: Primary | ICD-10-CM

## 2024-07-17 DIAGNOSIS — T81.72XA: Primary | ICD-10-CM

## 2024-07-17 PROCEDURE — 3074F SYST BP LT 130 MM HG: CPT | Mod: CPTII,S$GLB,, | Performed by: NURSE PRACTITIONER

## 2024-07-17 PROCEDURE — 3288F FALL RISK ASSESSMENT DOCD: CPT | Mod: CPTII,S$GLB,, | Performed by: NURSE PRACTITIONER

## 2024-07-17 PROCEDURE — 1159F MED LIST DOCD IN RCRD: CPT | Mod: CPTII,S$GLB,, | Performed by: NURSE PRACTITIONER

## 2024-07-17 PROCEDURE — 3078F DIAST BP <80 MM HG: CPT | Mod: CPTII,S$GLB,, | Performed by: NURSE PRACTITIONER

## 2024-07-17 PROCEDURE — 4010F ACE/ARB THERAPY RXD/TAKEN: CPT | Mod: CPTII,S$GLB,, | Performed by: NURSE PRACTITIONER

## 2024-07-17 PROCEDURE — 1157F ADVNC CARE PLAN IN RCRD: CPT | Mod: CPTII,S$GLB,, | Performed by: NURSE PRACTITIONER

## 2024-07-17 PROCEDURE — 99214 OFFICE O/P EST MOD 30 MIN: CPT | Mod: S$GLB,,, | Performed by: NURSE PRACTITIONER

## 2024-07-17 PROCEDURE — 1125F AMNT PAIN NOTED PAIN PRSNT: CPT | Mod: CPTII,S$GLB,, | Performed by: NURSE PRACTITIONER

## 2024-07-17 PROCEDURE — 99999 PR PBB SHADOW E&M-EST. PATIENT-LVL IV: CPT | Mod: PBBFAC,,, | Performed by: NURSE PRACTITIONER

## 2024-07-17 PROCEDURE — 1101F PT FALLS ASSESS-DOCD LE1/YR: CPT | Mod: CPTII,S$GLB,, | Performed by: NURSE PRACTITIONER

## 2024-07-17 PROCEDURE — 3008F BODY MASS INDEX DOCD: CPT | Mod: CPTII,S$GLB,, | Performed by: NURSE PRACTITIONER

## 2024-07-17 RX ORDER — DOXYCYCLINE 100 MG/1
100 CAPSULE ORAL 2 TIMES DAILY
Qty: 20 CAPSULE | Refills: 0 | Status: SHIPPED | OUTPATIENT
Start: 2024-07-17 | End: 2024-07-27
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
## 2024-07-21 ENCOUNTER — PATIENT MESSAGE (OUTPATIENT)
Dept: ADMINISTRATIVE | Facility: OTHER | Age: 69
End: 2024-07-21
Payer: MEDICARE

## 2024-07-22 ENCOUNTER — PATIENT MESSAGE (OUTPATIENT)
Dept: HEMATOLOGY/ONCOLOGY | Facility: CLINIC | Age: 69
End: 2024-07-22
Payer: MEDICARE

## 2024-07-22 ENCOUNTER — LAB VISIT (OUTPATIENT)
Dept: LAB | Facility: HOSPITAL | Age: 69
End: 2024-07-22
Payer: MEDICARE

## 2024-07-22 DIAGNOSIS — C34.91 ADENOCARCINOMA OF RIGHT LUNG: ICD-10-CM

## 2024-07-22 LAB
ALBUMIN SERPL BCP-MCNC: 3.2 G/DL (ref 3.5–5.2)
ALP SERPL-CCNC: 229 U/L (ref 55–135)
ALT SERPL W/O P-5'-P-CCNC: 90 U/L (ref 10–44)
ANION GAP SERPL CALC-SCNC: 7 MMOL/L (ref 8–16)
AST SERPL-CCNC: 44 U/L (ref 10–40)
BASOPHILS # BLD AUTO: 0.02 K/UL (ref 0–0.2)
BASOPHILS NFR BLD: 0.4 % (ref 0–1.9)
BILIRUB SERPL-MCNC: 0.4 MG/DL (ref 0.1–1)
BUN SERPL-MCNC: 16 MG/DL (ref 8–23)
CALCIUM SERPL-MCNC: 9.1 MG/DL (ref 8.7–10.5)
CHLORIDE SERPL-SCNC: 105 MMOL/L (ref 95–110)
CO2 SERPL-SCNC: 25 MMOL/L (ref 23–29)
CREAT SERPL-MCNC: 1.1 MG/DL (ref 0.5–1.4)
DIFFERENTIAL METHOD BLD: ABNORMAL
EOSINOPHIL # BLD AUTO: 0.1 K/UL (ref 0–0.5)
EOSINOPHIL NFR BLD: 1.3 % (ref 0–8)
ERYTHROCYTE [DISTWIDTH] IN BLOOD BY AUTOMATED COUNT: 12.8 % (ref 11.5–14.5)
EST. GFR  (NO RACE VARIABLE): >60 ML/MIN/1.73 M^2
GLUCOSE SERPL-MCNC: 146 MG/DL (ref 70–110)
HCT VFR BLD AUTO: 35.7 % (ref 40–54)
HGB BLD-MCNC: 11.8 G/DL (ref 14–18)
IMM GRANULOCYTES # BLD AUTO: 0.03 K/UL (ref 0–0.04)
IMM GRANULOCYTES NFR BLD AUTO: 0.6 % (ref 0–0.5)
LYMPHOCYTES # BLD AUTO: 0.8 K/UL (ref 1–4.8)
LYMPHOCYTES NFR BLD: 15.7 % (ref 18–48)
MAGNESIUM SERPL-MCNC: 2 MG/DL (ref 1.6–2.6)
MCH RBC QN AUTO: 32.8 PG (ref 27–31)
MCHC RBC AUTO-ENTMCNC: 33.1 G/DL (ref 32–36)
MCV RBC AUTO: 99 FL (ref 82–98)
MONOCYTES # BLD AUTO: 0.4 K/UL (ref 0.3–1)
MONOCYTES NFR BLD: 7.1 % (ref 4–15)
NEUTROPHILS # BLD AUTO: 4 K/UL (ref 1.8–7.7)
NEUTROPHILS NFR BLD: 75.5 % (ref 38–73)
NRBC BLD-RTO: 0 /100 WBC
PHOSPHATE SERPL-MCNC: 2.7 MG/DL (ref 2.7–4.5)
PLATELET # BLD AUTO: 229 K/UL (ref 150–450)
PMV BLD AUTO: 10 FL (ref 9.2–12.9)
POTASSIUM SERPL-SCNC: 4.5 MMOL/L (ref 3.5–5.1)
PROT SERPL-MCNC: 6.8 G/DL (ref 6–8.4)
RBC # BLD AUTO: 3.6 M/UL (ref 4.6–6.2)
SODIUM SERPL-SCNC: 137 MMOL/L (ref 136–145)
WBC # BLD AUTO: 5.34 K/UL (ref 3.9–12.7)

## 2024-07-22 PROCEDURE — 80053 COMPREHEN METABOLIC PANEL: CPT | Performed by: INTERNAL MEDICINE

## 2024-07-22 PROCEDURE — 83735 ASSAY OF MAGNESIUM: CPT | Performed by: INTERNAL MEDICINE

## 2024-07-22 PROCEDURE — 84100 ASSAY OF PHOSPHORUS: CPT | Performed by: INTERNAL MEDICINE

## 2024-07-22 PROCEDURE — 36415 COLL VENOUS BLD VENIPUNCTURE: CPT | Mod: PO | Performed by: INTERNAL MEDICINE

## 2024-07-22 PROCEDURE — 85025 COMPLETE CBC W/AUTO DIFF WBC: CPT | Mod: PO | Performed by: INTERNAL MEDICINE

## 2024-07-23 ENCOUNTER — OFFICE VISIT (OUTPATIENT)
Dept: HEMATOLOGY/ONCOLOGY | Facility: CLINIC | Age: 69
End: 2024-07-23
Payer: MEDICARE

## 2024-07-23 VITALS
TEMPERATURE: 97 F | WEIGHT: 131.63 LBS | OXYGEN SATURATION: 96 % | RESPIRATION RATE: 16 BRPM | BODY MASS INDEX: 21.16 KG/M2 | HEART RATE: 62 BPM | HEIGHT: 66 IN | DIASTOLIC BLOOD PRESSURE: 58 MMHG | SYSTOLIC BLOOD PRESSURE: 94 MMHG

## 2024-07-23 DIAGNOSIS — T45.1X5A IMMUNODEFICIENCY DUE TO CHEMOTHERAPY: ICD-10-CM

## 2024-07-23 DIAGNOSIS — T45.1X5A CHEMOTHERAPY-INDUCED NAUSEA: Primary | ICD-10-CM

## 2024-07-23 DIAGNOSIS — R11.0 CHEMOTHERAPY-INDUCED NAUSEA: Primary | ICD-10-CM

## 2024-07-23 DIAGNOSIS — Z72.0 TOBACCO ABUSE DISORDER: ICD-10-CM

## 2024-07-23 DIAGNOSIS — K74.00 LIVER FIBROSIS, TRANSPLANTED LIVER: ICD-10-CM

## 2024-07-23 DIAGNOSIS — C34.91 ADENOCARCINOMA OF RIGHT LUNG: ICD-10-CM

## 2024-07-23 DIAGNOSIS — Z79.899 IMMUNODEFICIENCY DUE TO CHEMOTHERAPY: ICD-10-CM

## 2024-07-23 DIAGNOSIS — C34.91 PRIMARY LUNG ADENOCARCINOMA, RIGHT: ICD-10-CM

## 2024-07-23 DIAGNOSIS — D84.821 IMMUNODEFICIENCY DUE TO CHEMOTHERAPY: ICD-10-CM

## 2024-07-23 DIAGNOSIS — T86.49 LIVER FIBROSIS, TRANSPLANTED LIVER: ICD-10-CM

## 2024-07-23 PROCEDURE — 3074F SYST BP LT 130 MM HG: CPT | Mod: CPTII,S$GLB,, | Performed by: INTERNAL MEDICINE

## 2024-07-23 PROCEDURE — 99999 PR PBB SHADOW E&M-EST. PATIENT-LVL V: CPT | Mod: PBBFAC,,, | Performed by: INTERNAL MEDICINE

## 2024-07-23 PROCEDURE — 1157F ADVNC CARE PLAN IN RCRD: CPT | Mod: CPTII,S$GLB,, | Performed by: INTERNAL MEDICINE

## 2024-07-23 PROCEDURE — 99215 OFFICE O/P EST HI 40 MIN: CPT | Mod: S$GLB,,, | Performed by: INTERNAL MEDICINE

## 2024-07-23 PROCEDURE — 3288F FALL RISK ASSESSMENT DOCD: CPT | Mod: CPTII,S$GLB,, | Performed by: INTERNAL MEDICINE

## 2024-07-23 PROCEDURE — 1101F PT FALLS ASSESS-DOCD LE1/YR: CPT | Mod: CPTII,S$GLB,, | Performed by: INTERNAL MEDICINE

## 2024-07-23 PROCEDURE — 3008F BODY MASS INDEX DOCD: CPT | Mod: CPTII,S$GLB,, | Performed by: INTERNAL MEDICINE

## 2024-07-23 PROCEDURE — 3078F DIAST BP <80 MM HG: CPT | Mod: CPTII,S$GLB,, | Performed by: INTERNAL MEDICINE

## 2024-07-23 PROCEDURE — G2211 COMPLEX E/M VISIT ADD ON: HCPCS | Mod: S$GLB,,, | Performed by: INTERNAL MEDICINE

## 2024-07-23 PROCEDURE — 1126F AMNT PAIN NOTED NONE PRSNT: CPT | Mod: CPTII,S$GLB,, | Performed by: INTERNAL MEDICINE

## 2024-07-23 PROCEDURE — 1159F MED LIST DOCD IN RCRD: CPT | Mod: CPTII,S$GLB,, | Performed by: INTERNAL MEDICINE

## 2024-07-23 PROCEDURE — 4010F ACE/ARB THERAPY RXD/TAKEN: CPT | Mod: CPTII,S$GLB,, | Performed by: INTERNAL MEDICINE

## 2024-07-23 RX ORDER — SODIUM CHLORIDE 0.9 % (FLUSH) 0.9 %
10 SYRINGE (ML) INJECTION
Status: CANCELLED | OUTPATIENT
Start: 2024-07-24

## 2024-07-23 RX ORDER — PROCHLORPERAZINE EDISYLATE 5 MG/ML
5 INJECTION INTRAMUSCULAR; INTRAVENOUS ONCE AS NEEDED
Status: CANCELLED
Start: 2024-07-24

## 2024-07-23 RX ORDER — ONDANSETRON 8 MG/1
8 TABLET, ORALLY DISINTEGRATING ORAL EVERY 6 HOURS PRN
Qty: 60 TABLET | Refills: 6 | Status: SHIPPED | OUTPATIENT
Start: 2024-07-23

## 2024-07-23 RX ORDER — EPINEPHRINE 0.3 MG/.3ML
0.3 INJECTION SUBCUTANEOUS ONCE AS NEEDED
Status: CANCELLED | OUTPATIENT
Start: 2024-07-24

## 2024-07-23 RX ORDER — PROMETHAZINE HYDROCHLORIDE 6.25 MG/5ML
25 SYRUP ORAL EVERY 6 HOURS PRN
Qty: 473 ML | Refills: 3 | Status: SHIPPED | OUTPATIENT
Start: 2024-07-23

## 2024-07-23 RX ORDER — DIPHENHYDRAMINE HYDROCHLORIDE 50 MG/ML
50 INJECTION INTRAMUSCULAR; INTRAVENOUS ONCE AS NEEDED
Status: CANCELLED | OUTPATIENT
Start: 2024-07-24

## 2024-07-23 RX ORDER — DM/P-EPHED/ACETAMINOPH/DOXYLAM 30-7.5/3
2 LIQUID (ML) ORAL
Qty: 72 LOZENGE | Refills: 2 | Status: SHIPPED | OUTPATIENT
Start: 2024-07-23

## 2024-07-23 RX ORDER — HEPARIN 100 UNIT/ML
500 SYRINGE INTRAVENOUS
Status: CANCELLED | OUTPATIENT
Start: 2024-07-24

## 2024-07-23 RX ORDER — IBUPROFEN 200 MG
1 TABLET ORAL DAILY
Qty: 28 PATCH | Refills: 6 | Status: SHIPPED | OUTPATIENT
Start: 2024-07-23

## 2024-07-23 NOTE — PROGRESS NOTES
Subjective     Patient ID: Mario Grier is a 69 y.o. male.    Chief Complaint: Adenocarcinoma of right lung  ONCOLOGIC HISTORY Mr. Grier is a 69 y.o. male smoker s/p OLTX 2013 (HCC), hyperthyroidism, BPH, and hx substance abuse with a new diagnosis of  RUL NSCLC.   Yearly CAP CT per transplant showed right upper lobe cavitary lesion.      PET 1/29/2024 2024: 2.2 cm spiculated, hypermetabolic cavitary right upper lobe pulmonary nodule, highly suspicious for malignancy. No evidence of FDG avid metastatic disease. Bilateral renal calculi and other incidental findings as above.     Percutaneous biopsy on 02/28/2024 positive for adenocarcinoma, lepidic type     On 04/22/2024 he underwent right upper robotic lobectomy.     Pathology reveals: Tumor Focality: Single focus  Tumor Site : upper lobe of lung:  Total Tumor Size :  4.2 cm  Histologic Type:  Invasive acinar adenocarcinoma  Histologic Grade :  G3, poorly differentiated  Visceral Pleura Invasion  :  Not identified  Direct Invasion of Adjacent Structures :   Not applicable (no adjacent structures present)  Treatment Effect :   No known presurgical therapy  Lymphovascular Invasion :   Present (not otherwise specified)  Margin Status for Invasive Carcinoma :  All margins negative for invasive carcinoma ; 3.5 cm to the vascular margin  Lymph Node(s) from Prior Procedures  : left 11, left 10, and 7--no carcinoma identified;  Included, but not in count  Regional Lymph Node Status :  All regional lymph nodes negative for tumor  Number of Lymph Nodes with Tumor : 0  Number of Lymph Nodes Examined  : likely 12 in this procedure  Young Site(s) Examined :  Right level 8, level 7, level 2, level 4, level 11, and level 12  Distant Site(s) Involved, if applicable  none known  PATHOLOGIC STAGE CLASSIFICATION (pTNM, AJCC 8th Edition):  pT2b pN0 pMX  Additional Findings:  There is a focal area of considerable emphysema with cavity formation      Case reviewed in thoracic  "multidisciplinary tumor board on 05/08/2024 and recommendations include "Referral to Med/Onc for discussion of adjuvant systemic therapy for Stage IIB disease"        He started adjuvant chemo with Cisplatin and ALimta on 6/10/2024 \      HPIHe comes in for cycle 3 of adjuvant chemo with Cisplatin and ALimta  He noted nausea and vomiting on day 3 after chemo and lasted 2 days and subsequently resolved   He did well after that  ECOG PS 0 he is here by himself      Review of Systems   Constitutional:  Negative for appetite change and unexpected weight change.   HENT:  Negative for mouth sores.    Eyes:  Positive for visual disturbance.   Respiratory:  Positive for cough. Negative for shortness of breath.    Cardiovascular:  Negative for chest pain.   Gastrointestinal:  Negative for abdominal pain and diarrhea.   Genitourinary:  Negative for frequency.   Musculoskeletal:  Positive for back pain.   Integumentary:  Negative for rash.   Neurological:  Negative for headaches.   Hematological:  Positive for adenopathy.   Psychiatric/Behavioral:  The patient is nervous/anxious.           Objective     Physical Exam  Vitals reviewed.   Constitutional:       Appearance: He is well-developed.   HENT:      Mouth/Throat:      Pharynx: No oropharyngeal exudate.   Cardiovascular:      Rate and Rhythm: Normal rate.      Heart sounds: Normal heart sounds.   Pulmonary:      Effort: Pulmonary effort is normal.      Breath sounds: Normal breath sounds. No wheezing.   Abdominal:      General: Bowel sounds are normal.      Palpations: Abdomen is soft.      Tenderness: There is no abdominal tenderness.   Musculoskeletal:         General: No tenderness.   Lymphadenopathy:      Cervical: No cervical adenopathy.   Skin:     General: Skin is warm and dry.      Findings: No rash.   Neurological:      Mental Status: He is alert and oriented to person, place, and time.      Coordination: Coordination normal.   Psychiatric:         Thought " Content: Thought content normal.         Judgment: Judgment normal.           LABS:  WBC   Date Value Ref Range Status   2024 5.34 3.90 - 12.70 K/uL Final     Hemoglobin   Date Value Ref Range Status   2024 11.8 (L) 14.0 - 18.0 g/dL Final     POC Hematocrit   Date Value Ref Range Status   2013 32 (L) 36 - 54 %PCV Final     Hematocrit   Date Value Ref Range Status   2024 35.7 (L) 40.0 - 54.0 % Final     Platelets   Date Value Ref Range Status   2024 229 150 - 450 K/uL Final     Gran # (ANC)   Date Value Ref Range Status   2024 4.0 1.8 - 7.7 K/uL Final     Gran %   Date Value Ref Range Status   2024 75.5 (H) 38.0 - 73.0 % Final       Chemistry        Component Value Date/Time     2024 1000    K 4.5 2024 1000     2024 1000    CO2 25 2024 1000    BUN 16 2024 1000    CREATININE 1.1 2024 1000     (H) 2024 1000        Component Value Date/Time    CALCIUM 9.1 2024 1000    ALKPHOS 229 (H) 2024 1000    AST 44 (H) 2024 1000    ALT 90 (H) 2024 1000    BILITOT 0.4 2024 1000    ESTGFRAFRICA >60.0 2022    EGFRNONAA 56.9 (A) 202218        Ma  Phos: 2.7       Assessment and Plan     1. Chemotherapy-induced nausea  -     ondansetron (ZOFRAN-ODT) 8 MG TbDL; Take 1 tablet (8 mg total) by mouth every 6 (six) hours as needed.  Dispense: 60 tablet; Refill: 6  -     promethazine (PHENERGAN) 6.25 mg/5 mL syrup; Take 20 mLs (25 mg total) by mouth every 6 (six) hours as needed for Nausea.  Dispense: 473 mL; Refill: 3    2. Adenocarcinoma of right lung    3. Immunodeficiency due to chemotherapy    4. Primary lung adenocarcinoma, right    5. Liver fibrosis, transplanted liver  Overview:  F3 on biopsy in 2016      6. Tobacco abuse disorder  -     nicotine (NICODERM CQ) 14 mg/24 hr; Place 1 patch onto the skin once daily.  Dispense: 28 patch; Refill: 6  -     nicotine polacrilex 2 MG Lozg;  Take 1 lozenge (2 mg total) by mouth as needed.  Dispense: 72 lozenge; Refill: 2    Other orders  -     0.9% NaCl 1,000 mL with magnesium sulfate 1 g, potassium chloride 20 mEq infusion  -     fosaprepitant 150 mg in 0.9% NaCl 150 mL IVPB  -     palonosetron 0.25mg/dexAMETHasone 12mg in NS IVPB 0.25 mg 50 mL  -     PEMEtrexed disodium (ALIMTA) 825 mg in 0.9% NaCl SolP 100 mL chemo infusion  -     CISplatin (Platinol) 75 mg/m2 = 125 mg in 0.9% NaCl 625 mL chemo infusion  -     0.9% NaCl 1,000 mL with magnesium sulfate 1 g, potassium chloride 20 mEq infusion  -     pegfilgrastim (NEULASTA (ON BODY INJECTOR)) injection 6 mg  -     prochlorperazine injection Soln 5 mg  -     EPINEPHrine (EPIPEN) 0.3 mg/0.3 mL pen injection 0.3 mg  -     diphenhydrAMINE injection 50 mg  -     hydrocortisone sodium succinate injection 100 mg  -     0.9% NaCl 250 mL flush bag  -     sodium chloride 0.9% flush 10 mL  -     heparin, porcine (PF) 100 unit/mL injection flush 500 Units  -     alteplase injection 2 mg  -     sodium chloride 0.9% bolus 1,000 mL 1,000 mL        Route Chart for Scheduling    Med Onc Chart Routing      Follow up with physician . Cancel lab on 8/12 leave everything else as is   Follow up with NICOLE    Infusion scheduling note    Injection scheduling note    Labs    Imaging    Pharmacy appointment    Other referrals            Treatment Plan Information   OP NSCLC PEMETREXED + CISPLATIN Q3W   Janet Mccray MD   Upcoming Treatment Dates - OP NSCLC PEMETREXED + CISPLATIN Q3W    7/24/2024       Chemotherapy       PEMEtrexed disodium (ALIMTA) 825 mg in 0.9% NaCl SolP 100 mL chemo infusion       CISplatin (Platinol) 75 mg/m2 = 125 mg in 0.9% NaCl 625 mL chemo infusion       Pre-Hydration       0.9% NaCl 1,000 mL with magnesium sulfate 1 g, potassium chloride 20 mEq infusion       Post-Hydration       0.9% NaCl 1,000 mL with magnesium sulfate 1 g, potassium chloride 20 mEq infusion       Antiemetics       palonosetron  0.25mg/dexAMETHasone 12mg in NS IVPB 0.25 mg 50 mL       fosaprepitant 150 mg in 0.9% NaCl 150 mL IVPB       Growth Factor       pegfilgrastim (NEULASTA (ON BODY INJECTOR)) injection 6 mg  8/14/2024       Chemotherapy       PEMEtrexed disodium (ALIMTA) 825 mg in sodium chloride 0.9% SolP 100 mL chemo infusion       CISplatin (Platinol) 75 mg/m2 = 125 mg in sodium chloride 0.9% 625 mL chemo infusion       Supportive Care       cyanocobalamin injection 1,000 mcg       Pre-Hydration       sodium chloride 0.9% 1,000 mL with magnesium sulfate 1 g, potassium chloride 20 mEq infusion       Post-Hydration       sodium chloride 0.9% 1,000 mL with magnesium sulfate 1 g, potassium chloride 20 mEq infusion       Antiemetics       palonosetron 0.25mg/dexAMETHasone 12mg in NS IVPB 0.25 mg 50 mL       fosaprepitant 150 mg in sodium chloride 0.9% 150 mL IVPB       Growth Factor       pegfilgrastim (NEULASTA (ON BODY INJECTOR)) injection 6 mg       Mr. Grier we will proceed with cycle 3 of adjuvant chemotherapy with cisplatin and Alimta.  Since he had excessive nausea and vomiting after cycle 2 and was unable to take his oral Zofran tablets and oral Compazine tablets, E scribed under the sublingual Zofran as well as liquid Phenergan to help with chemo induced nausea and vomiting.  Also advised him to call us should he have nausea and vomiting again and we can bring him in to infusion center for IV antiemetics and IV fluids to help with the symptoms    Status post transplant continues to be on his immunosuppression and doing well follows with transplant  Tobacco abuse disorder he requested nicotine patches and nicotine lozenges so these prescriptions were sent for him    He will return in 3 weeks for final cycle of adjuvant chemotherapy after which he will undergo restaging scans and begin surveillance, he is not a candidate for adjuvant immunotherapy secondary to his post transplant status    Visit today included increased  complexity associated with the care of the episodic problem chemotherapy addressed and managing the longitudinal care of the patient due to the serious and/or complex managed problem(s) Non small cell lung cancer       Above care plan was discussed with patient and all questions were addressed to his satisfaction

## 2024-07-24 ENCOUNTER — INFUSION (OUTPATIENT)
Dept: INFUSION THERAPY | Facility: HOSPITAL | Age: 69
End: 2024-07-24
Attending: INTERNAL MEDICINE
Payer: MEDICARE

## 2024-07-24 ENCOUNTER — DOCUMENTATION ONLY (OUTPATIENT)
Dept: HEMATOLOGY/ONCOLOGY | Facility: CLINIC | Age: 69
End: 2024-07-24
Payer: MEDICARE

## 2024-07-24 ENCOUNTER — DOCUMENTATION ONLY (OUTPATIENT)
Dept: INFUSION THERAPY | Facility: HOSPITAL | Age: 69
End: 2024-07-24
Payer: MEDICARE

## 2024-07-24 ENCOUNTER — PATIENT MESSAGE (OUTPATIENT)
Dept: TRANSPLANT | Facility: CLINIC | Age: 69
End: 2024-07-24
Payer: MEDICARE

## 2024-07-24 VITALS
WEIGHT: 131.63 LBS | SYSTOLIC BLOOD PRESSURE: 128 MMHG | OXYGEN SATURATION: 96 % | BODY MASS INDEX: 21.16 KG/M2 | RESPIRATION RATE: 15 BRPM | HEIGHT: 66 IN | DIASTOLIC BLOOD PRESSURE: 68 MMHG | HEART RATE: 62 BPM | TEMPERATURE: 98 F

## 2024-07-24 DIAGNOSIS — C34.91 ADENOCARCINOMA OF RIGHT LUNG: Primary | ICD-10-CM

## 2024-07-24 PROCEDURE — 96413 CHEMO IV INFUSION 1 HR: CPT | Mod: PN

## 2024-07-24 PROCEDURE — 96366 THER/PROPH/DIAG IV INF ADDON: CPT | Mod: PN

## 2024-07-24 PROCEDURE — 25000003 PHARM REV CODE 250: Mod: PN | Performed by: INTERNAL MEDICINE

## 2024-07-24 PROCEDURE — 96411 CHEMO IV PUSH ADDL DRUG: CPT | Mod: PN

## 2024-07-24 PROCEDURE — 96377 APPLICATON ON-BODY INJECTOR: CPT | Mod: PN

## 2024-07-24 PROCEDURE — 63600175 PHARM REV CODE 636 W HCPCS: Mod: PN | Performed by: INTERNAL MEDICINE

## 2024-07-24 PROCEDURE — 96367 TX/PROPH/DG ADDL SEQ IV INF: CPT | Mod: PN

## 2024-07-24 RX ORDER — PROCHLORPERAZINE EDISYLATE 5 MG/ML
5 INJECTION INTRAMUSCULAR; INTRAVENOUS ONCE AS NEEDED
Status: DISCONTINUED | OUTPATIENT
Start: 2024-07-24 | End: 2024-07-25 | Stop reason: HOSPADM

## 2024-07-24 RX ORDER — DIPHENHYDRAMINE HYDROCHLORIDE 50 MG/ML
50 INJECTION INTRAMUSCULAR; INTRAVENOUS ONCE AS NEEDED
Status: DISCONTINUED | OUTPATIENT
Start: 2024-07-24 | End: 2024-07-25 | Stop reason: HOSPADM

## 2024-07-24 RX ORDER — SODIUM CHLORIDE 0.9 % (FLUSH) 0.9 %
10 SYRINGE (ML) INJECTION
Status: DISCONTINUED | OUTPATIENT
Start: 2024-07-24 | End: 2024-07-25 | Stop reason: HOSPADM

## 2024-07-24 RX ADMIN — FOSAPREPITANT 150 MG: 150 INJECTION, POWDER, LYOPHILIZED, FOR SOLUTION INTRAVENOUS at 01:07

## 2024-07-24 RX ADMIN — PEGFILGRASTIM 6 MG: KIT SUBCUTANEOUS at 04:07

## 2024-07-24 RX ADMIN — DEXAMETHASONE SODIUM PHOSPHATE 0.25 MG: 10 INJECTION, SOLUTION INTRAMUSCULAR; INTRAVENOUS at 02:07

## 2024-07-24 RX ADMIN — SODIUM CHLORIDE: 9 INJECTION, SOLUTION INTRAVENOUS at 11:07

## 2024-07-24 RX ADMIN — POTASSIUM CHLORIDE 500 ML/HR: 2 INJECTION, SOLUTION, CONCENTRATE INTRAVENOUS at 11:07

## 2024-07-24 RX ADMIN — PEMETREXED DISODIUM 800 MG: 500 INJECTION, POWDER, LYOPHILIZED, FOR SOLUTION INTRAVENOUS at 02:07

## 2024-07-24 RX ADMIN — POTASSIUM CHLORIDE 500 ML/HR: 2 INJECTION, SOLUTION, CONCENTRATE INTRAVENOUS at 04:07

## 2024-07-24 RX ADMIN — SODIUM CHLORIDE 125 MG: 9 INJECTION, SOLUTION INTRAVENOUS at 03:07

## 2024-07-24 NOTE — PLAN OF CARE
Problem: Adult Inpatient Plan of Care  Goal: Plan of Care Review  7/24/2024 1726 by Dana Wilson, RN  Outcome: Progressing  Flowsheets (Taken 7/24/2024 1726)  Plan of Care Reviewed With:   patient   spouse  7/24/2024 1149 by Dana Wilson, RN  Outcome: Progressing   Pt tolerated his Alimta/Cisplatin infusions well, with a 30 minute observation  between the two meds with NAD. No new c/o voiced. Pt given a schedule and reviewed, pt verbalized understanding. Pt ambulated out of the clinic without difficulty accompanied by his wife.

## 2024-07-24 NOTE — PROGRESS NOTES
Chart reviewed for oncology navigational needs. Patient continues with alimta cisplatin infusions.  No navigational needs are noted. Future MD and infusions are already scheduled.  Will continue to monitor throughout treatment.

## 2024-07-24 NOTE — PROGRESS NOTES
ONCOLOGY NUTRITION   FOLLOW UP VISIT        Mario Grier is a 69 y.o. male.  DATE: 07/24/2024        Oncology Diagnosis: Lung cancer     REFERRAL FROM:   [] Integrative Oncology   [] Med/Heme Oncology  [] Radiation Oncology  [] Surgical Oncology   [] Infusion Nurse    [x] Routine Nutrition follow up    TREATMENT PLAN:   [] Full treatment plan pending  [x] Chemotherapy  [] Immunotherapy  [] Radiation  [] Concurrent  [] Surgery  [] Treatment complete/post-treatment    ANTHROPOMETRICS:  Wt Readings from Last 10 Encounters:   07/24/24 59.7 kg (131 lb 9.8 oz)   07/23/24 59.7 kg (131 lb 9.8 oz)   07/17/24 61.2 kg (134 lb 14.7 oz)   07/05/24 62.6 kg (138 lb 0.1 oz)   07/03/24 60.8 kg (134 lb 0.6 oz)   06/11/24 58.3 kg (128 lb 8.5 oz)   06/10/24 58.3 kg (128 lb 8.5 oz)   06/04/24 59.2 kg (130 lb 8.2 oz)   05/16/24 59.6 kg (131 lb 6.3 oz)   05/03/24 59.9 kg (132 lb 0.9 oz)      Weight Changes: has been stable    PHYSICAL EXAM:  Muscle Wasting Observed:  [] No Deficit   [x] Mild Deficit   [] Moderate   [] Severe    INTAKE:  [x] PO Intake [] TF Intake  Current Diet: regular diet  Dietary Patterns:  Eating meals/snacks as tolerated  [] Oral nutritional supplements:     SYMPTOMS/COMPLAINTS:   [x] No nutritional concerns at current  [] Diarrhea                    [] Constipation           [] Nausea                 [] Vomiting                [] Indigestion                [] Reflux              [] Poor Appetite            [] Anorexia                 [] Early Satiety         [] Gas                       [] Bloating                     [] Dry Mouth    [] Mucositis                   [] Mouth Sores           [] Poor Dentition      [] Difficulty chewing  [] Difficulty Swallowing   [] Pain with swallowing [] Change in taste      [] Change in smell   [] Pain (general)       [] Fatigue                      [] Sleep issues    [] Weight loss  [] other, please specify-     Nutrition Re-Assessment Risk: Low risk    [x] Labs reviewed   [x]  Meds reviewed    Education Provided:   [x] No Education Needed at this time  [] Diarrhea                                              [] Constipation                          [] Nausea/Vomiting  [] Mucositis                [] Dry Mouth    [] Dealing with changes in Taste/Smell  [] Dealing with Poor Appetite   [] Soft/moist Diet      [] Weight Loss/Gain     [] Weight Maintenance                           [] Indigestion/GERD                 [] Gas/Bloating          [] Foods High/ Low in specific nutrients [] Increasing Calories/Protein   [] Milkshake/Smoothies Recipes   [] Nutrition Supplements                        [] Increasing Fluid Intakes         [] Foods that fight cancer    [] Evidence bases resources                 [] Fermented Foods/Probiotics  [] Mediterranean/Plant Based Diet     [] Other, specify                                   [] Handouts provided      [] Samples provided     RD NOTE:  RD met with pt at chairside during infusion tx. Pt present for cycle 3 Alimata/Cisplatin/OBI. Pt states his appetite is good. He eats a big breakfast every morning will eat lunch late afternoon around 2-3pm. Dinner he says is between 7-8pm. Pt enjoys being outside and works in his garden daily. Pt drinking an Ensure at time of RD visit. RD provided pt with samples and coupons. Pt weight has been stable.       RD Goals:   [x] Weight stable                  [] Weight gain                      [] Weight Loss                               [x] Continue adequate Kcal/protein   [] Increase Kcal/protein      [] Adjust Tube-feeding Rx   [] Tolerate Tube Feedings             [] Increase tube feedings to goal     [] Tolerate Supplements     [] Symptom Improvement   [] Understand nutrition Education  [x] Offer supportive visits   [] other, please specify    RECOMMENDATIONS:  Continue current fluid intake to maintain proper hydration   Continue current calorie/protein intake to maintain body weight     Follow up: PRN throughout  tx    Raquel Wing RDN, YOGIN  07/24/2024  3:13 PM

## 2024-07-24 NOTE — PLAN OF CARE
Problem: Fatigue  Goal: Improved Activity Tolerance  Intervention: Promote Improved Energy  Flowsheets (Taken 7/24/2024 1149)  Fatigue Management:   activity schedule adjusted   fatigue-related activity identified   frequent rest breaks encouraged   paced activity encouraged  Sleep/Rest Enhancement:   regular sleep/rest pattern promoted   relaxation techniques promoted   natural light exposure provided  Activity Management:   Ambulated -L4   Ambulated to bathroom - L4   Ambulated in [place] - L4   Up in stretcher chair - L1  Environmental Support:   calm environment promoted   personal routine supported   rest periods encouraged     Problem: Adult Inpatient Plan of Care  Goal: Patient-Specific Goal (Individualized)  Outcome: Progressing  Flowsheets (Taken 7/24/2024 1149)  Individualized Care Needs: recliner, warm blanket, pillow, dim lights, tv, wife at chairside, conversation  Anxieties, Fears or Concerns: none  Patient/Family-Specific Goals (Include Timeframe): no s/s of reaction during treatment

## 2024-07-25 ENCOUNTER — INFUSION (OUTPATIENT)
Dept: INFUSION THERAPY | Facility: HOSPITAL | Age: 69
End: 2024-07-25
Attending: INTERNAL MEDICINE
Payer: MEDICARE

## 2024-07-25 VITALS
HEART RATE: 62 BPM | DIASTOLIC BLOOD PRESSURE: 63 MMHG | RESPIRATION RATE: 17 BRPM | OXYGEN SATURATION: 95 % | SYSTOLIC BLOOD PRESSURE: 118 MMHG | TEMPERATURE: 98 F

## 2024-07-25 DIAGNOSIS — C34.91 ADENOCARCINOMA OF RIGHT LUNG: Primary | ICD-10-CM

## 2024-07-25 PROCEDURE — 25000003 PHARM REV CODE 250: Mod: PN | Performed by: INTERNAL MEDICINE

## 2024-07-25 PROCEDURE — 96360 HYDRATION IV INFUSION INIT: CPT | Mod: PN

## 2024-07-25 PROCEDURE — 96361 HYDRATE IV INFUSION ADD-ON: CPT | Mod: PN

## 2024-07-25 RX ORDER — SODIUM CHLORIDE 9 MG/ML
1000 INJECTION, SOLUTION INTRAVENOUS
Status: COMPLETED | OUTPATIENT
Start: 2024-07-25 | End: 2024-07-25

## 2024-07-25 RX ADMIN — SODIUM CHLORIDE 1000 ML: 9 INJECTION, SOLUTION INTRAVENOUS at 01:07

## 2024-07-25 NOTE — PLAN OF CARE
Problem: Oncology Care  Goal: Optimal Oral Intake  Intervention: Minimize Barriers to Oral Intake  Flowsheets (Taken 7/25/2024 1340)  Oral Nutrition Promotion: calorie-dense foods provided  Nutrition Interventions: supplemental drinks provided     Problem: Adult Inpatient Plan of Care  Goal: Patient-Specific Goal (Individualized)  Outcome: Progressing  Flowsheets (Taken 7/25/2024 1340)  Individualized Care Needs:   recliner, blanket, pillow, spouse at chairside   tv, dimmed lights   offered amenities and refreshments  Anxieties, Fears or Concerns: fatigue  Patient/Family-Specific Goals (Include Timeframe): no s/s reaction during infusion

## 2024-07-25 NOTE — PLAN OF CARE
Problem: Adult Inpatient Plan of Care  Goal: Plan of Care Review  Outcome: Progressing  Flowsheets (Taken 7/25/2024 1610)  Plan of Care Reviewed With:   patient   spouse     Patient tolerated IV hydration without incident. IV flushed with blood return, saline locked, and removed. AVS provided per portal;schedule reviewed. Ambulates per self.   No acute distress noted.  Next appointment 8/14/24.

## 2024-07-29 ENCOUNTER — LAB VISIT (OUTPATIENT)
Dept: LAB | Facility: HOSPITAL | Age: 69
End: 2024-07-29
Attending: INTERNAL MEDICINE
Payer: MEDICARE

## 2024-07-29 DIAGNOSIS — C34.91 ADENOCARCINOMA OF RIGHT LUNG: ICD-10-CM

## 2024-07-29 DIAGNOSIS — Z94.4 LIVER REPLACED BY TRANSPLANT: ICD-10-CM

## 2024-07-29 LAB
BASOPHILS # BLD AUTO: ABNORMAL K/UL (ref 0–0.2)
BASOPHILS NFR BLD: 0 % (ref 0–1.9)
DIFFERENTIAL METHOD BLD: ABNORMAL
EOSINOPHIL # BLD AUTO: ABNORMAL K/UL (ref 0–0.5)
EOSINOPHIL NFR BLD: 0 % (ref 0–8)
ERYTHROCYTE [DISTWIDTH] IN BLOOD BY AUTOMATED COUNT: 14.7 % (ref 11.5–14.5)
HCT VFR BLD AUTO: 31.6 % (ref 40–54)
HGB BLD-MCNC: 10.4 G/DL (ref 14–18)
HYPOCHROMIA BLD QL SMEAR: ABNORMAL
IMM GRANULOCYTES # BLD AUTO: ABNORMAL K/UL (ref 0–0.04)
IMM GRANULOCYTES NFR BLD AUTO: ABNORMAL % (ref 0–0.5)
LYMPHOCYTES # BLD AUTO: ABNORMAL K/UL (ref 1–4.8)
LYMPHOCYTES NFR BLD: 1 % (ref 18–48)
MCH RBC QN AUTO: 33.5 PG (ref 27–31)
MCHC RBC AUTO-ENTMCNC: 32.9 G/DL (ref 32–36)
MCV RBC AUTO: 102 FL (ref 82–98)
METAMYELOCYTES NFR BLD MANUAL: 4 %
MONOCYTES # BLD AUTO: ABNORMAL K/UL (ref 0.3–1)
MONOCYTES NFR BLD: 5 % (ref 4–15)
NEUTROPHILS NFR BLD: 82 % (ref 38–73)
NEUTS BAND NFR BLD MANUAL: 8 %
NRBC BLD-RTO: 0 /100 WBC
PLATELET # BLD AUTO: 92 K/UL (ref 150–450)
PLATELET BLD QL SMEAR: ABNORMAL
PMV BLD AUTO: 12.5 FL (ref 9.2–12.9)
POLYCHROMASIA BLD QL SMEAR: ABNORMAL
RBC # BLD AUTO: 3.1 M/UL (ref 4.6–6.2)
WBC # BLD AUTO: 41.9 K/UL (ref 3.9–12.7)

## 2024-07-29 PROCEDURE — 80195 ASSAY OF SIROLIMUS: CPT | Performed by: INTERNAL MEDICINE

## 2024-07-29 PROCEDURE — 85027 COMPLETE CBC AUTOMATED: CPT | Mod: PO | Performed by: INTERNAL MEDICINE

## 2024-07-29 PROCEDURE — 85007 BL SMEAR W/DIFF WBC COUNT: CPT | Mod: PO | Performed by: INTERNAL MEDICINE

## 2024-07-29 PROCEDURE — 36415 COLL VENOUS BLD VENIPUNCTURE: CPT | Mod: PO | Performed by: INTERNAL MEDICINE

## 2024-07-30 ENCOUNTER — TELEPHONE (OUTPATIENT)
Dept: TRANSPLANT | Facility: CLINIC | Age: 69
End: 2024-07-30
Payer: MEDICARE

## 2024-07-30 ENCOUNTER — PATIENT MESSAGE (OUTPATIENT)
Dept: TRANSPLANT | Facility: CLINIC | Age: 69
End: 2024-07-30
Payer: MEDICARE

## 2024-07-30 LAB — SIROLIMUS BLD-MCNC: 6 NG/ML (ref 4–20)

## 2024-07-30 NOTE — TELEPHONE ENCOUNTER
Update received from patient via [facility];     When I did that last lab for the sirlomuss level that is before taking the medication so six is where it was at

## 2024-07-31 ENCOUNTER — PATIENT MESSAGE (OUTPATIENT)
Dept: TRANSPLANT | Facility: CLINIC | Age: 69
End: 2024-07-31
Payer: MEDICARE

## 2024-07-31 ENCOUNTER — TELEPHONE (OUTPATIENT)
Dept: TRANSPLANT | Facility: CLINIC | Age: 69
End: 2024-07-31
Payer: MEDICARE

## 2024-07-31 DIAGNOSIS — C22.0 HCC (HEPATOCELLULAR CARCINOMA): Primary | ICD-10-CM

## 2024-07-31 DIAGNOSIS — Z94.4 LIVER REPLACED BY TRANSPLANT: ICD-10-CM

## 2024-07-31 NOTE — TELEPHONE ENCOUNTER
Patient notified and instructed via [facility]:    Your labs were reviewed by ; no changes made. Repeat labs due 10/28/24. Thanks.

## 2024-08-02 ENCOUNTER — TELEPHONE (OUTPATIENT)
Dept: HEMATOLOGY/ONCOLOGY | Facility: CLINIC | Age: 69
End: 2024-08-02
Payer: MEDICARE

## 2024-08-02 ENCOUNTER — PATIENT MESSAGE (OUTPATIENT)
Dept: HEMATOLOGY/ONCOLOGY | Facility: CLINIC | Age: 69
End: 2024-08-02
Payer: MEDICARE

## 2024-08-02 NOTE — TELEPHONE ENCOUNTER
Spoke to pt regarding portal message regarding hard know developed to his forearm yesterday, as happened 5-6 days after tx.  Will forward message to Syed Stevenson NP. Pt requesting antibiotics, as Dr Mccray prescribed last time this occurred, and it then resolved.  Pt also cut his finger, but states he is keeping it clean, dry and covered.  Will forward to Syed Stevenson NP.

## 2024-08-09 ENCOUNTER — PATIENT MESSAGE (OUTPATIENT)
Dept: HEMATOLOGY/ONCOLOGY | Facility: CLINIC | Age: 69
End: 2024-08-09
Payer: MEDICARE

## 2024-08-09 ENCOUNTER — PATIENT MESSAGE (OUTPATIENT)
Dept: ADMINISTRATIVE | Facility: OTHER | Age: 69
End: 2024-08-09
Payer: MEDICARE
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
## 2024-08-13 ENCOUNTER — OFFICE VISIT (OUTPATIENT)
Dept: CARDIOLOGY | Facility: CLINIC | Age: 69
End: 2024-08-13
Payer: MEDICARE

## 2024-08-13 ENCOUNTER — LAB VISIT (OUTPATIENT)
Dept: LAB | Facility: HOSPITAL | Age: 69
End: 2024-08-13
Attending: INTERNAL MEDICINE
Payer: MEDICARE

## 2024-08-13 ENCOUNTER — OFFICE VISIT (OUTPATIENT)
Dept: HEMATOLOGY/ONCOLOGY | Facility: CLINIC | Age: 69
End: 2024-08-13
Payer: MEDICARE

## 2024-08-13 ENCOUNTER — PATIENT MESSAGE (OUTPATIENT)
Dept: ADMINISTRATIVE | Facility: OTHER | Age: 69
End: 2024-08-13
Payer: MEDICARE

## 2024-08-13 VITALS
DIASTOLIC BLOOD PRESSURE: 78 MMHG | TEMPERATURE: 98 F | WEIGHT: 134.06 LBS | HEART RATE: 65 BPM | SYSTOLIC BLOOD PRESSURE: 127 MMHG | RESPIRATION RATE: 16 BRPM | OXYGEN SATURATION: 99 % | BODY MASS INDEX: 21.55 KG/M2 | HEIGHT: 66 IN

## 2024-08-13 VITALS
WEIGHT: 133.19 LBS | HEIGHT: 66 IN | SYSTOLIC BLOOD PRESSURE: 81 MMHG | HEART RATE: 56 BPM | BODY MASS INDEX: 21.4 KG/M2 | DIASTOLIC BLOOD PRESSURE: 53 MMHG

## 2024-08-13 DIAGNOSIS — I65.21 CAROTID ARTERY PLAQUE, RIGHT: Chronic | ICD-10-CM

## 2024-08-13 DIAGNOSIS — C34.91 ADENOCARCINOMA OF RIGHT LUNG: Primary | ICD-10-CM

## 2024-08-13 DIAGNOSIS — I34.0 NONRHEUMATIC MITRAL VALVE REGURGITATION: Chronic | ICD-10-CM

## 2024-08-13 DIAGNOSIS — R60.0 BILATERAL LEG EDEMA: ICD-10-CM

## 2024-08-13 DIAGNOSIS — I51.7 LAE (LEFT ATRIAL ENLARGEMENT): Chronic | ICD-10-CM

## 2024-08-13 DIAGNOSIS — R60.9 EDEMA, UNSPECIFIED TYPE: ICD-10-CM

## 2024-08-13 DIAGNOSIS — I95.2 HYPOTENSION DUE TO DRUGS: Primary | ICD-10-CM

## 2024-08-13 DIAGNOSIS — C34.91 ADENOCARCINOMA OF RIGHT LUNG: ICD-10-CM

## 2024-08-13 DIAGNOSIS — Z71.6 TOBACCO ABUSE COUNSELING: Chronic | ICD-10-CM

## 2024-08-13 LAB
ALBUMIN SERPL BCP-MCNC: 3.2 G/DL (ref 3.5–5.2)
ALP SERPL-CCNC: 169 U/L (ref 55–135)
ALT SERPL W/O P-5'-P-CCNC: 45 U/L (ref 10–44)
ANION GAP SERPL CALC-SCNC: 8 MMOL/L (ref 8–16)
AST SERPL-CCNC: 28 U/L (ref 10–40)
BASOPHILS # BLD AUTO: 0.02 K/UL (ref 0–0.2)
BASOPHILS NFR BLD: 0.4 % (ref 0–1.9)
BILIRUB SERPL-MCNC: 0.2 MG/DL (ref 0.1–1)
BUN SERPL-MCNC: 16 MG/DL (ref 8–23)
CALCIUM SERPL-MCNC: 8.9 MG/DL (ref 8.7–10.5)
CHLORIDE SERPL-SCNC: 104 MMOL/L (ref 95–110)
CO2 SERPL-SCNC: 27 MMOL/L (ref 23–29)
CREAT SERPL-MCNC: 1.1 MG/DL (ref 0.5–1.4)
DIFFERENTIAL METHOD BLD: ABNORMAL
EOSINOPHIL # BLD AUTO: 0.2 K/UL (ref 0–0.5)
EOSINOPHIL NFR BLD: 2.9 % (ref 0–8)
ERYTHROCYTE [DISTWIDTH] IN BLOOD BY AUTOMATED COUNT: 14.6 % (ref 11.5–14.5)
EST. GFR  (NO RACE VARIABLE): >60 ML/MIN/1.73 M^2
GLUCOSE SERPL-MCNC: 102 MG/DL (ref 70–110)
HCT VFR BLD AUTO: 26.8 % (ref 40–54)
HGB BLD-MCNC: 9 G/DL (ref 14–18)
IMM GRANULOCYTES # BLD AUTO: 0.02 K/UL (ref 0–0.04)
IMM GRANULOCYTES NFR BLD AUTO: 0.4 % (ref 0–0.5)
LYMPHOCYTES # BLD AUTO: 0.9 K/UL (ref 1–4.8)
LYMPHOCYTES NFR BLD: 17.2 % (ref 18–48)
MAGNESIUM SERPL-MCNC: 2.2 MG/DL (ref 1.6–2.6)
MCH RBC QN AUTO: 33.6 PG (ref 27–31)
MCHC RBC AUTO-ENTMCNC: 33.6 G/DL (ref 32–36)
MCV RBC AUTO: 100 FL (ref 82–98)
MONOCYTES # BLD AUTO: 0.5 K/UL (ref 0.3–1)
MONOCYTES NFR BLD: 10.6 % (ref 4–15)
NEUTROPHILS # BLD AUTO: 3.5 K/UL (ref 1.8–7.7)
NEUTROPHILS NFR BLD: 68.5 % (ref 38–73)
NRBC BLD-RTO: 0 /100 WBC
PHOSPHATE SERPL-MCNC: 3 MG/DL (ref 2.7–4.5)
PLATELET # BLD AUTO: 197 K/UL (ref 150–450)
PMV BLD AUTO: 10.7 FL (ref 9.2–12.9)
POTASSIUM SERPL-SCNC: 5.3 MMOL/L (ref 3.5–5.1)
PROT SERPL-MCNC: 6.4 G/DL (ref 6–8.4)
RBC # BLD AUTO: 2.68 M/UL (ref 4.6–6.2)
SODIUM SERPL-SCNC: 139 MMOL/L (ref 136–145)
WBC # BLD AUTO: 5.11 K/UL (ref 3.9–12.7)

## 2024-08-13 PROCEDURE — 4010F ACE/ARB THERAPY RXD/TAKEN: CPT | Mod: CPTII,S$GLB,, | Performed by: INTERNAL MEDICINE

## 2024-08-13 PROCEDURE — 99999 PR PBB SHADOW E&M-EST. PATIENT-LVL IV: CPT | Mod: PBBFAC,,, | Performed by: INTERNAL MEDICINE

## 2024-08-13 PROCEDURE — 99214 OFFICE O/P EST MOD 30 MIN: CPT | Mod: S$GLB,,, | Performed by: INTERNAL MEDICINE

## 2024-08-13 PROCEDURE — 99999 PR PBB SHADOW E&M-EST. PATIENT-LVL V: CPT | Mod: PBBFAC,,, | Performed by: INTERNAL MEDICINE

## 2024-08-13 PROCEDURE — 1125F AMNT PAIN NOTED PAIN PRSNT: CPT | Mod: CPTII,S$GLB,, | Performed by: INTERNAL MEDICINE

## 2024-08-13 PROCEDURE — 99215 OFFICE O/P EST HI 40 MIN: CPT | Mod: S$GLB,,, | Performed by: INTERNAL MEDICINE

## 2024-08-13 PROCEDURE — 3288F FALL RISK ASSESSMENT DOCD: CPT | Mod: CPTII,S$GLB,, | Performed by: INTERNAL MEDICINE

## 2024-08-13 PROCEDURE — 3074F SYST BP LT 130 MM HG: CPT | Mod: CPTII,S$GLB,, | Performed by: INTERNAL MEDICINE

## 2024-08-13 PROCEDURE — 83735 ASSAY OF MAGNESIUM: CPT | Mod: PN | Performed by: INTERNAL MEDICINE

## 2024-08-13 PROCEDURE — 1101F PT FALLS ASSESS-DOCD LE1/YR: CPT | Mod: CPTII,S$GLB,, | Performed by: INTERNAL MEDICINE

## 2024-08-13 PROCEDURE — 1157F ADVNC CARE PLAN IN RCRD: CPT | Mod: CPTII,S$GLB,, | Performed by: INTERNAL MEDICINE

## 2024-08-13 PROCEDURE — 3078F DIAST BP <80 MM HG: CPT | Mod: CPTII,S$GLB,, | Performed by: INTERNAL MEDICINE

## 2024-08-13 PROCEDURE — 85025 COMPLETE CBC W/AUTO DIFF WBC: CPT | Mod: PN | Performed by: INTERNAL MEDICINE

## 2024-08-13 PROCEDURE — 84100 ASSAY OF PHOSPHORUS: CPT | Mod: PN | Performed by: INTERNAL MEDICINE

## 2024-08-13 PROCEDURE — 36415 COLL VENOUS BLD VENIPUNCTURE: CPT | Mod: PN | Performed by: INTERNAL MEDICINE

## 2024-08-13 PROCEDURE — 3008F BODY MASS INDEX DOCD: CPT | Mod: CPTII,S$GLB,, | Performed by: INTERNAL MEDICINE

## 2024-08-13 PROCEDURE — 80053 COMPREHEN METABOLIC PANEL: CPT | Mod: PN | Performed by: INTERNAL MEDICINE

## 2024-08-13 PROCEDURE — 1159F MED LIST DOCD IN RCRD: CPT | Mod: CPTII,S$GLB,, | Performed by: INTERNAL MEDICINE

## 2024-08-13 PROCEDURE — G2211 COMPLEX E/M VISIT ADD ON: HCPCS | Mod: S$GLB,,, | Performed by: INTERNAL MEDICINE

## 2024-08-13 RX ORDER — CYANOCOBALAMIN 1000 UG/ML
1000 INJECTION, SOLUTION INTRAMUSCULAR; SUBCUTANEOUS
Status: CANCELLED | OUTPATIENT
Start: 2024-08-14

## 2024-08-13 RX ORDER — HEPARIN 100 UNIT/ML
500 SYRINGE INTRAVENOUS
Status: CANCELLED | OUTPATIENT
Start: 2024-08-14

## 2024-08-13 RX ORDER — EPINEPHRINE 0.3 MG/.3ML
0.3 INJECTION SUBCUTANEOUS ONCE AS NEEDED
Status: CANCELLED | OUTPATIENT
Start: 2024-08-14

## 2024-08-13 RX ORDER — PROCHLORPERAZINE EDISYLATE 5 MG/ML
5 INJECTION INTRAMUSCULAR; INTRAVENOUS ONCE AS NEEDED
Status: CANCELLED
Start: 2024-08-14

## 2024-08-13 RX ORDER — SODIUM CHLORIDE 0.9 % (FLUSH) 0.9 %
10 SYRINGE (ML) INJECTION
Status: CANCELLED | OUTPATIENT
Start: 2024-08-14

## 2024-08-13 RX ORDER — DIPHENHYDRAMINE HYDROCHLORIDE 50 MG/ML
50 INJECTION INTRAMUSCULAR; INTRAVENOUS ONCE AS NEEDED
Status: CANCELLED | OUTPATIENT
Start: 2024-08-14

## 2024-08-13 RX ORDER — METOPROLOL SUCCINATE 25 MG/1
12.5 TABLET, EXTENDED RELEASE ORAL DAILY
Qty: 45 TABLET | Refills: 1 | Status: SHIPPED | OUTPATIENT
Start: 2024-08-13 | End: 2025-08-13

## 2024-08-13 NOTE — PROGRESS NOTES
Subjective     Patient ID: Mario Grier is a 69 y.o. male.    Chief Complaint: Lung Cancer  ONCOLOGIC HISTORY Mr. Grier is a 69 y.o. male smoker s/p OLTX 2013 (HCC), hyperthyroidism, BPH, and hx substance abuse with a new diagnosis of  RUL NSCLC.   Yearly CAP CT per transplant showed right upper lobe cavitary lesion.      PET 1/29/2024 2024: 2.2 cm spiculated, hypermetabolic cavitary right upper lobe pulmonary nodule, highly suspicious for malignancy. No evidence of FDG avid metastatic disease. Bilateral renal calculi and other incidental findings as above.     Percutaneous biopsy on 02/28/2024 positive for adenocarcinoma, lepidic type     On 04/22/2024 he underwent right upper robotic lobectomy.     Pathology reveals: Tumor Focality: Single focus  Tumor Site : upper lobe of lung:  Total Tumor Size :  4.2 cm  Histologic Type:  Invasive acinar adenocarcinoma  Histologic Grade :  G3, poorly differentiated  Visceral Pleura Invasion  :  Not identified  Direct Invasion of Adjacent Structures :   Not applicable (no adjacent structures present)  Treatment Effect :   No known presurgical therapy  Lymphovascular Invasion :   Present (not otherwise specified)  Margin Status for Invasive Carcinoma :  All margins negative for invasive carcinoma ; 3.5 cm to the vascular margin  Lymph Node(s) from Prior Procedures  : left 11, left 10, and 7--no carcinoma identified;  Included, but not in count  Regional Lymph Node Status :  All regional lymph nodes negative for tumor  Number of Lymph Nodes with Tumor : 0  Number of Lymph Nodes Examined  : likely 12 in this procedure  Young Site(s) Examined :  Right level 8, level 7, level 2, level 4, level 11, and level 12  Distant Site(s) Involved, if applicable  none known  PATHOLOGIC STAGE CLASSIFICATION (pTNM, AJCC 8th Edition):  pT2b pN0 pMX  Additional Findings:  There is a focal area of considerable emphysema with cavity formation      Case reviewed in thoracic multidisciplinary tumor  "board on 05/08/2024 and recommendations include "Referral to Med/Onc for discussion of adjuvant systemic therapy for Stage IIB disease"        He started adjuvant chemo with Cisplatin and ALimta on 6/10/2024 \        HPIHe comes in for cycle 4 of adjuvant chemo with Cisplatin and ALimta    Review of Systems   Constitutional:  Negative for appetite change and unexpected weight change.   HENT:  Negative for mouth sores.    Eyes:  Positive for visual disturbance.   Respiratory:  Positive for cough. Negative for shortness of breath.    Cardiovascular:  Negative for chest pain.   Gastrointestinal:  Negative for abdominal pain and diarrhea.   Genitourinary:  Negative for frequency.   Musculoskeletal:  Negative for back pain.   Integumentary:  Negative for rash.   Neurological:  Negative for headaches.   Hematological:  Negative for adenopathy.   Psychiatric/Behavioral:  The patient is not nervous/anxious.           Objective     Physical Exam         LABS:  WBC   Date Value Ref Range Status   08/13/2024 5.11 3.90 - 12.70 K/uL Final     Hemoglobin   Date Value Ref Range Status   08/13/2024 9.0 (L) 14.0 - 18.0 g/dL Final     POC Hematocrit   Date Value Ref Range Status   07/11/2013 32 (L) 36 - 54 %PCV Final     Hematocrit   Date Value Ref Range Status   08/13/2024 26.8 (L) 40.0 - 54.0 % Final     Platelets   Date Value Ref Range Status   08/13/2024 197 150 - 450 K/uL Final     Gran # (ANC)   Date Value Ref Range Status   08/13/2024 3.5 1.8 - 7.7 K/uL Final     Gran %   Date Value Ref Range Status   08/13/2024 68.5 38.0 - 73.0 % Final       Chemistry        Component Value Date/Time     08/13/2024 1125    K 5.3 (H) 08/13/2024 1125     08/13/2024 1125    CO2 27 08/13/2024 1125    BUN 16 08/13/2024 1125    CREATININE 1.1 08/13/2024 1125     08/13/2024 1125        Component Value Date/Time    CALCIUM 8.9 08/13/2024 1125    ALKPHOS 169 (H) 08/13/2024 1125    AST 28 08/13/2024 1125    ALT 45 (H) 08/13/2024 1125 "    BILITOT 0.2 2024 1125    ESTGFRAFRICA >60.0 2022 0918    EGFRNONAA 56.9 (A) 202218        Ma.2  Phos:  3    Assessment and Plan     1. Adenocarcinoma of right lung  -     CBC w/ DIFF; Future; Expected date: 2024  -     CMP; Future; Expected date: 2024  -     CT Chest Abdomen Pelvis With IV Contrast (XPD); Future; Expected date: 2024    2. Edema, unspecified type  -     Cancel: US Lower Extremity Veins Bilateral; Future; Expected date: 2024  -     US Lower Extremity Veins Bilateral; Future; Expected date: 2024    Other orders  -     cyanocobalamin injection 1,000 mcg  -     0.9% NaCl 1,000 mL with magnesium sulfate 1 g, potassium chloride 20 mEq infusion  -     fosaprepitant 150 mg in 0.9% NaCl 150 mL IVPB  -     palonosetron 0.25mg/dexAMETHasone 12mg in NS IVPB 0.25 mg 50 mL  -     PEMEtrexed disodium (ALIMTA) 825 mg in 0.9% NaCl SolP 100 mL chemo infusion  -     CISplatin (Platinol) 75 mg/m2 = 125 mg in 0.9% NaCl 625 mL chemo infusion  -     0.9% NaCl 1,000 mL with magnesium sulfate 1 g, potassium chloride 20 mEq infusion  -     pegfilgrastim (NEULASTA (ON BODY INJECTOR)) injection 6 mg  -     prochlorperazine injection Soln 5 mg  -     EPINEPHrine (EPIPEN) 0.3 mg/0.3 mL pen injection 0.3 mg  -     diphenhydrAMINE injection 50 mg  -     hydrocortisone sodium succinate injection 100 mg  -     0.9% NaCl 250 mL flush bag  -     sodium chloride 0.9% flush 10 mL  -     heparin, porcine (PF) 100 unit/mL injection flush 500 Units  -     alteplase injection 2 mg  -     sodium chloride 0.9% bolus 1,000 mL 1,000 mL          Route Chart for Scheduling    Med Onc Chart Routing      Follow up with physician . In 4-5 weeks from now schedule CBC, CMP, CT chest, abdomen/pelvis and see me   Follow up with NICOLE    Infusion scheduling note    Injection scheduling note    Labs    Imaging    Pharmacy appointment    Other referrals              Treatment Plan Information   OP  NSCLC PEMETREXED + CISPLATIN Q3W   Janet Mccray MD   Upcoming Treatment Dates - OP NSCLC PEMETREXED + CISPLATIN Q3W    8/14/2024       Chemotherapy       PEMEtrexed disodium (ALIMTA) 825 mg in 0.9% NaCl SolP 100 mL chemo infusion       CISplatin (Platinol) 75 mg/m2 = 125 mg in 0.9% NaCl 625 mL chemo infusion       Supportive Care       cyanocobalamin injection 1,000 mcg       Pre-Hydration       0.9% NaCl 1,000 mL with magnesium sulfate 1 g, potassium chloride 20 mEq infusion       Post-Hydration       0.9% NaCl 1,000 mL with magnesium sulfate 1 g, potassium chloride 20 mEq infusion       Antiemetics       palonosetron 0.25mg/dexAMETHasone 12mg in NS IVPB 0.25 mg 50 mL       fosaprepitant 150 mg in 0.9% NaCl 150 mL IVPB       Growth Factor       pegfilgrastim (NEULASTA (ON BODY INJECTOR)) injection 6 mg     Mr. Grier is doing well overall and will proceed with cycle 4 adjuvant chemotherapy tomorrow which completes his adjuvant treatment.  He will return in about 4-5 weeks with restaging CT scans    Left leg swelling requires further evaluation we will obtain lower extremity ultrasound, we will call him with results    Visit today included increased complexity associated with the care of the episodic problem chemotherapy addressed and managing the longitudinal care of the patient due to the serious and/or complex managed problem(s) non-small-cell lung cancer    Above plan reviewed with the patient and his accompanying wife and all questions were answered to their satisfaction

## 2024-08-14 ENCOUNTER — PATIENT MESSAGE (OUTPATIENT)
Dept: HEMATOLOGY/ONCOLOGY | Facility: CLINIC | Age: 69
End: 2024-08-14
Payer: MEDICARE

## 2024-08-14 ENCOUNTER — INFUSION (OUTPATIENT)
Dept: INFUSION THERAPY | Facility: HOSPITAL | Age: 69
End: 2024-08-14
Attending: INTERNAL MEDICINE
Payer: MEDICARE

## 2024-08-14 VITALS
DIASTOLIC BLOOD PRESSURE: 71 MMHG | HEIGHT: 66 IN | TEMPERATURE: 98 F | WEIGHT: 132.06 LBS | HEART RATE: 71 BPM | BODY MASS INDEX: 21.22 KG/M2 | SYSTOLIC BLOOD PRESSURE: 131 MMHG | RESPIRATION RATE: 16 BRPM

## 2024-08-14 DIAGNOSIS — C34.91 ADENOCARCINOMA OF RIGHT LUNG: Primary | ICD-10-CM

## 2024-08-14 PROCEDURE — 96367 TX/PROPH/DG ADDL SEQ IV INF: CPT | Mod: PN

## 2024-08-14 PROCEDURE — 96377 APPLICATON ON-BODY INJECTOR: CPT | Mod: PN

## 2024-08-14 PROCEDURE — 96366 THER/PROPH/DIAG IV INF ADDON: CPT | Mod: PN

## 2024-08-14 PROCEDURE — 96411 CHEMO IV PUSH ADDL DRUG: CPT | Mod: PN

## 2024-08-14 PROCEDURE — 25000003 PHARM REV CODE 250: Mod: PN | Performed by: INTERNAL MEDICINE

## 2024-08-14 PROCEDURE — 96413 CHEMO IV INFUSION 1 HR: CPT | Mod: PN

## 2024-08-14 PROCEDURE — 96372 THER/PROPH/DIAG INJ SC/IM: CPT | Mod: 59,PN

## 2024-08-14 PROCEDURE — 63600175 PHARM REV CODE 636 W HCPCS: Mod: PN | Performed by: INTERNAL MEDICINE

## 2024-08-14 RX ORDER — CYANOCOBALAMIN 1000 UG/ML
1000 INJECTION, SOLUTION INTRAMUSCULAR; SUBCUTANEOUS
Status: COMPLETED | OUTPATIENT
Start: 2024-08-14 | End: 2024-08-14

## 2024-08-14 RX ADMIN — PEMETREXED DISODIUM 800 MG: 500 INJECTION, POWDER, LYOPHILIZED, FOR SOLUTION INTRAVENOUS at 12:08

## 2024-08-14 RX ADMIN — SODIUM CHLORIDE: 9 INJECTION, SOLUTION INTRAVENOUS at 09:08

## 2024-08-14 RX ADMIN — POTASSIUM CHLORIDE 550 ML/HR: 2 INJECTION, SOLUTION, CONCENTRATE INTRAVENOUS at 02:08

## 2024-08-14 RX ADMIN — DEXAMETHASONE SODIUM PHOSPHATE 0.25 MG: 10 INJECTION, SOLUTION INTRAMUSCULAR; INTRAVENOUS at 12:08

## 2024-08-14 RX ADMIN — FOSAPREPITANT 150 MG: 150 INJECTION, POWDER, LYOPHILIZED, FOR SOLUTION INTRAVENOUS at 11:08

## 2024-08-14 RX ADMIN — CYANOCOBALAMIN 1000 MCG: 1000 INJECTION, SOLUTION INTRAMUSCULAR at 09:08

## 2024-08-14 RX ADMIN — POTASSIUM CHLORIDE 500 ML/HR: 2 INJECTION, SOLUTION, CONCENTRATE INTRAVENOUS at 09:08

## 2024-08-14 RX ADMIN — SODIUM CHLORIDE 125 MG: 9 INJECTION, SOLUTION INTRAVENOUS at 01:08

## 2024-08-14 RX ADMIN — PEGFILGRASTIM 6 MG: KIT SUBCUTANEOUS at 04:08

## 2024-08-14 NOTE — PLAN OF CARE
Pt tolerated treatment well.  No s/s of infusion reaction noted.  Neulasta OBI applied to pt's abdomen and written times given to pt as to when the medication will be delivered and when he can remove the device.  Reminded to take Claritin for the next few days.  To RTC tomorrow for IVF's.  Instructed to call MD with any problems.

## 2024-08-15 ENCOUNTER — NURSE TRIAGE (OUTPATIENT)
Dept: ADMINISTRATIVE | Facility: CLINIC | Age: 69
End: 2024-08-15
Payer: MEDICARE

## 2024-08-15 ENCOUNTER — PATIENT MESSAGE (OUTPATIENT)
Dept: HEMATOLOGY/ONCOLOGY | Facility: CLINIC | Age: 69
End: 2024-08-15
Payer: MEDICARE

## 2024-08-15 ENCOUNTER — DOCUMENTATION ONLY (OUTPATIENT)
Dept: HEMATOLOGY/ONCOLOGY | Facility: CLINIC | Age: 69
End: 2024-08-15
Payer: MEDICARE

## 2024-08-15 NOTE — NURSING
Chart reviewed for oncology navigational needs. Patient completed his adjuvant alimta cisplatin infusions.  Restaging scans are already scheduled  No navigational needs are noted.   Will continue to monitor.

## 2024-08-15 NOTE — TELEPHONE ENCOUNTER
Post- liver tx pt.  Chemo yesterday  Face puffy when pt woke up this morning per wife  Denies difficulty breathing     No BPA available for facial swelling     Advised pt per protocol to be seen at  or ED within the next 4 hrs. Pt's wife vu.         Reason for Disposition   SEVERE swelling (e.g., entire face)    Additional Information   Negative: [1] Life-threatening reaction (anaphylaxis) in the past to similar substance (e.g., food, insect bite/sting, chemical, etc.) AND [2] < 2 hours since exposure   Negative: Unresponsive, passed out or very weak   Negative: Swollen tongue   Negative: Difficulty breathing or wheezing   Negative: Sounds like a life-threatening emergency to the triager   Negative: [1] SEVERE swelling (e.g., entire face) AND [2] < 2 hours since exposure to high-risk allergen (e.g., peanuts, tree nuts, fish, shellfish or 1st dose of drug) AND [3] no serious symptoms AND [4] no serious allergic reaction in the past   Negative: Fever   Negative: Taking an ACE Inhibitor medicine (e.g., benazepril / LOTENSIN, captopril / CAPOTEN, enalapril / VASOTEC, lisinopril / ZESTRIL)   Negative: Patient sounds very sick or weak to the triager   Negative: Pregnant 20 or more weeks   Negative: Postpartum (from 0 to 6 weeks after delivery)    Protocols used: Face Swelling-A-AH

## 2024-08-16 ENCOUNTER — INFUSION (OUTPATIENT)
Dept: INFUSION THERAPY | Facility: HOSPITAL | Age: 69
End: 2024-08-16
Attending: INTERNAL MEDICINE
Payer: MEDICARE

## 2024-08-16 ENCOUNTER — PATIENT MESSAGE (OUTPATIENT)
Dept: HEMATOLOGY/ONCOLOGY | Facility: CLINIC | Age: 69
End: 2024-08-16
Payer: MEDICARE

## 2024-08-16 VITALS
BODY MASS INDEX: 22 KG/M2 | WEIGHT: 136.88 LBS | TEMPERATURE: 98 F | HEIGHT: 66 IN | HEART RATE: 68 BPM | RESPIRATION RATE: 18 BRPM | SYSTOLIC BLOOD PRESSURE: 131 MMHG | DIASTOLIC BLOOD PRESSURE: 73 MMHG | OXYGEN SATURATION: 95 %

## 2024-08-16 DIAGNOSIS — C34.91 ADENOCARCINOMA OF RIGHT LUNG: Primary | ICD-10-CM

## 2024-08-16 PROCEDURE — 25000003 PHARM REV CODE 250: Mod: PN | Performed by: INTERNAL MEDICINE

## 2024-08-16 PROCEDURE — 96360 HYDRATION IV INFUSION INIT: CPT | Mod: PN

## 2024-08-16 RX ORDER — SODIUM CHLORIDE 9 MG/ML
1000 INJECTION, SOLUTION INTRAVENOUS
Status: COMPLETED | OUTPATIENT
Start: 2024-08-16 | End: 2024-08-16

## 2024-08-16 RX ADMIN — SODIUM CHLORIDE 1000 ML: 9 INJECTION, SOLUTION INTRAVENOUS at 11:08

## 2024-08-16 NOTE — PLAN OF CARE
Problem: Adult Inpatient Plan of Care  Goal: Plan of Care Review  Outcome: Progressing  Flowsheets (Taken 8/16/2024 1320)  Plan of Care Reviewed With:   patient   spouse  Goal: Patient-Specific Goal (Individualized)  Outcome: Progressing     Problem: Fatigue  Goal: Improved Activity Tolerance  Outcome: Progressing  Intervention: Promote Improved Energy  Flowsheets (Taken 8/16/2024 1320)  Fatigue Management:   activity schedule adjusted   fatigue-related activity identified   frequent rest breaks encouraged   paced activity encouraged  Sleep/Rest Enhancement:   awakenings minimized   noise level reduced   regular sleep/rest pattern promoted   relaxation techniques promoted   room darkened   family presence promoted  Activity Management:   Ambulated -L4   Ambulated to bathroom - L4   Ambulated in [place] - L4   Up in stretcher chair - L1  Environmental Support: calm environment promoted   Pt tolerated IVFs well today. NAD/no concerns voiced. Reviewed follow-up appointments. All questions were answered, ambulated independently at d/c with spouse.

## 2024-08-19 ENCOUNTER — PATIENT MESSAGE (OUTPATIENT)
Dept: ADMINISTRATIVE | Facility: OTHER | Age: 69
End: 2024-08-19
Payer: MEDICARE

## 2024-08-22 ENCOUNTER — PATIENT MESSAGE (OUTPATIENT)
Dept: HEMATOLOGY/ONCOLOGY | Facility: CLINIC | Age: 69
End: 2024-08-22
Payer: MEDICARE

## 2024-08-23 ENCOUNTER — PATIENT MESSAGE (OUTPATIENT)
Dept: ADMINISTRATIVE | Facility: OTHER | Age: 69
End: 2024-08-23
Payer: MEDICARE

## 2024-08-26 ENCOUNTER — PATIENT MESSAGE (OUTPATIENT)
Dept: ADMINISTRATIVE | Facility: OTHER | Age: 69
End: 2024-08-26
Payer: MEDICARE

## 2024-09-10 DIAGNOSIS — C34.91 ADENOCARCINOMA OF RIGHT LUNG: ICD-10-CM

## 2024-09-10 RX ORDER — FOLIC ACID 1 MG/1
1000 TABLET ORAL
Qty: 120 TABLET | Refills: 0 | Status: SHIPPED | OUTPATIENT
Start: 2024-09-10

## 2024-09-13 ENCOUNTER — DOCUMENTATION ONLY (OUTPATIENT)
Dept: HEMATOLOGY/ONCOLOGY | Facility: CLINIC | Age: 69
End: 2024-09-13
Payer: MEDICARE

## 2024-09-13 NOTE — NURSING
Chart reviewed for oncology navigational needs following restaging scans; however, his scans were canceled. No follow ups are scheduled at this time.  Will reach out to Dr. Mccray's team for assistance in rescheduling appts.  Will continue to monitor.

## 2024-09-17 DIAGNOSIS — I10 HYPERTENSION, UNSPECIFIED TYPE: ICD-10-CM

## 2024-09-18 RX ORDER — OLMESARTAN MEDOXOMIL 5 MG/1
10 TABLET ORAL NIGHTLY
Qty: 60 TABLET | Refills: 3 | Status: SHIPPED | OUTPATIENT
Start: 2024-09-18

## 2024-09-30 ENCOUNTER — PATIENT MESSAGE (OUTPATIENT)
Dept: ADMINISTRATIVE | Facility: OTHER | Age: 69
End: 2024-09-30
Payer: MEDICARE

## 2024-10-01 ENCOUNTER — PATIENT MESSAGE (OUTPATIENT)
Dept: HEMATOLOGY/ONCOLOGY | Facility: CLINIC | Age: 69
End: 2024-10-01
Payer: MEDICARE

## 2024-10-07 ENCOUNTER — HOSPITAL ENCOUNTER (OUTPATIENT)
Dept: RADIOLOGY | Facility: HOSPITAL | Age: 69
Discharge: HOME | End: 2024-10-07
Attending: INTERNAL MEDICINE
Payer: MEDICARE

## 2024-10-07 DIAGNOSIS — C34.91 ADENOCARCINOMA OF RIGHT LUNG: ICD-10-CM

## 2024-10-07 PROCEDURE — 71260 CT THORAX DX C+: CPT | Mod: TC,PO

## 2024-10-07 PROCEDURE — 74177 CT ABD & PELVIS W/CONTRAST: CPT | Mod: TC,PO

## 2024-10-07 PROCEDURE — 71260 CT THORAX DX C+: CPT | Mod: 26,,, | Performed by: RADIOLOGY

## 2024-10-07 PROCEDURE — 25500020 PHARM REV CODE 255: Mod: PO | Performed by: INTERNAL MEDICINE

## 2024-10-07 PROCEDURE — A9698 NON-RAD CONTRAST MATERIALNOC: HCPCS | Mod: PO | Performed by: INTERNAL MEDICINE

## 2024-10-07 PROCEDURE — 74177 CT ABD & PELVIS W/CONTRAST: CPT | Mod: 26,,, | Performed by: RADIOLOGY

## 2024-10-07 RX ADMIN — IOHEXOL 75 ML: 350 INJECTION, SOLUTION INTRAVENOUS at 12:10

## 2024-10-07 RX ADMIN — IOHEXOL 1000 ML: 12 SOLUTION ORAL at 12:10

## 2024-10-10 ENCOUNTER — OFFICE VISIT (OUTPATIENT)
Dept: HEMATOLOGY/ONCOLOGY | Facility: CLINIC | Age: 69
End: 2024-10-10
Payer: MEDICARE

## 2024-10-10 VITALS
WEIGHT: 127.44 LBS | RESPIRATION RATE: 16 BRPM | BODY MASS INDEX: 20.48 KG/M2 | HEIGHT: 66 IN | SYSTOLIC BLOOD PRESSURE: 111 MMHG | DIASTOLIC BLOOD PRESSURE: 72 MMHG | OXYGEN SATURATION: 95 % | TEMPERATURE: 98 F | HEART RATE: 79 BPM

## 2024-10-10 DIAGNOSIS — C34.91 ADENOCARCINOMA OF RIGHT LUNG: Primary | ICD-10-CM

## 2024-10-10 DIAGNOSIS — R63.0 DECREASED APPETITE: ICD-10-CM

## 2024-10-10 PROCEDURE — 3288F FALL RISK ASSESSMENT DOCD: CPT | Mod: CPTII,S$GLB,, | Performed by: INTERNAL MEDICINE

## 2024-10-10 PROCEDURE — 1125F AMNT PAIN NOTED PAIN PRSNT: CPT | Mod: CPTII,S$GLB,, | Performed by: INTERNAL MEDICINE

## 2024-10-10 PROCEDURE — 4010F ACE/ARB THERAPY RXD/TAKEN: CPT | Mod: CPTII,S$GLB,, | Performed by: INTERNAL MEDICINE

## 2024-10-10 PROCEDURE — 99999 PR PBB SHADOW E&M-EST. PATIENT-LVL V: CPT | Mod: PBBFAC,,, | Performed by: INTERNAL MEDICINE

## 2024-10-10 PROCEDURE — 1101F PT FALLS ASSESS-DOCD LE1/YR: CPT | Mod: CPTII,S$GLB,, | Performed by: INTERNAL MEDICINE

## 2024-10-10 PROCEDURE — 1157F ADVNC CARE PLAN IN RCRD: CPT | Mod: CPTII,S$GLB,, | Performed by: INTERNAL MEDICINE

## 2024-10-10 PROCEDURE — 3008F BODY MASS INDEX DOCD: CPT | Mod: CPTII,S$GLB,, | Performed by: INTERNAL MEDICINE

## 2024-10-10 PROCEDURE — 3074F SYST BP LT 130 MM HG: CPT | Mod: CPTII,S$GLB,, | Performed by: INTERNAL MEDICINE

## 2024-10-10 PROCEDURE — 3078F DIAST BP <80 MM HG: CPT | Mod: CPTII,S$GLB,, | Performed by: INTERNAL MEDICINE

## 2024-10-10 PROCEDURE — 1159F MED LIST DOCD IN RCRD: CPT | Mod: CPTII,S$GLB,, | Performed by: INTERNAL MEDICINE

## 2024-10-10 PROCEDURE — 99214 OFFICE O/P EST MOD 30 MIN: CPT | Mod: S$GLB,,, | Performed by: INTERNAL MEDICINE

## 2024-10-10 RX ORDER — CYPROHEPTADINE HYDROCHLORIDE 4 MG/1
4 TABLET ORAL 4 TIMES DAILY
Qty: 120 TABLET | Refills: 6 | Status: SHIPPED | OUTPATIENT
Start: 2024-10-10

## 2024-10-10 NOTE — PROGRESS NOTES
Subjective     Patient ID: Mario Grier is a 69 y.o. male.    Chief Complaint: Follow-up (f/u;labs;CT;tx/)  ONCOLOGIC HISTORY Mr. Grier is a 69 y.o. male smoker s/p OLTX 2013 (HCC), hyperthyroidism, BPH, and hx substance abuse with a new diagnosis of  RUL NSCLC.   Yearly CAP CT per transplant showed right upper lobe cavitary lesion.      PET 1/29/2024 2024: 2.2 cm spiculated, hypermetabolic cavitary right upper lobe pulmonary nodule, highly suspicious for malignancy. No evidence of FDG avid metastatic disease. Bilateral renal calculi and other incidental findings as above.     Percutaneous biopsy on 02/28/2024 positive for adenocarcinoma, lepidic type     On 04/22/2024 he underwent right upper robotic lobectomy.     Pathology reveals: Tumor Focality: Single focus  Tumor Site : upper lobe of lung:  Total Tumor Size :  4.2 cm  Histologic Type:  Invasive acinar adenocarcinoma  Histologic Grade :  G3, poorly differentiated  Visceral Pleura Invasion  :  Not identified  Direct Invasion of Adjacent Structures :   Not applicable (no adjacent structures present)  Treatment Effect :   No known presurgical therapy  Lymphovascular Invasion :   Present (not otherwise specified)  Margin Status for Invasive Carcinoma :  All margins negative for invasive carcinoma ; 3.5 cm to the vascular margin  Lymph Node(s) from Prior Procedures  : left 11, left 10, and 7--no carcinoma identified;  Included, but not in count  Regional Lymph Node Status :  All regional lymph nodes negative for tumor  Number of Lymph Nodes with Tumor : 0  Number of Lymph Nodes Examined  : likely 12 in this procedure  Young Site(s) Examined :  Right level 8, level 7, level 2, level 4, level 11, and level 12  Distant Site(s) Involved, if applicable  none known  PATHOLOGIC STAGE CLASSIFICATION (pTNM, AJCC 8th Edition):  pT2b pN0 pMX  Additional Findings:  There is a focal area of considerable emphysema with cavity formation      Case reviewed in thoracic  "multidisciplinary tumor board on 05/08/2024 and recommendations include "Referral to Med/Onc for discussion of adjuvant systemic therapy for Stage IIB disease"        He started adjuvant chemo with Cisplatin and ALimta on 6/10/2024 and completed 4 cycles as of 8/13/2024            HPIHe comes in to review his CT scans from 10/7/2024 which reveal "New right lower lobe 15 mm ground-glass and small right pleural effusion, possibly infectious/inflammatory, neoplastic, or posttraumatic in the setting of a healing posterior right 8th rib fracture.  Recommend follow-up as per oncologic protocol. Intra and extrahepatic biliary ductal dilatation, similar to priors"       Review of Systems   Constitutional:  Positive for appetite change and unexpected weight change.   HENT:  Negative for mouth sores.    Eyes:  Negative for visual disturbance.   Respiratory:  Negative for cough and shortness of breath.    Cardiovascular:  Negative for chest pain.   Gastrointestinal:  Negative for abdominal pain and diarrhea.   Genitourinary:  Negative for frequency.   Musculoskeletal:  Negative for back pain.   Integumentary:  Negative for rash.   Neurological:  Negative for headaches.   Hematological:  Negative for adenopathy.   Psychiatric/Behavioral:  The patient is nervous/anxious.           Objective     Physical Exam  Vitals reviewed.   Constitutional:       Appearance: He is well-developed.   HENT:      Mouth/Throat:      Pharynx: No oropharyngeal exudate.   Cardiovascular:      Rate and Rhythm: Normal rate.      Heart sounds: Normal heart sounds.   Pulmonary:      Effort: Pulmonary effort is normal.      Breath sounds: Normal breath sounds. No wheezing.   Abdominal:      General: Bowel sounds are normal.      Palpations: Abdomen is soft.      Tenderness: There is no abdominal tenderness.   Musculoskeletal:         General: No tenderness.   Lymphadenopathy:      Cervical: No cervical adenopathy.   Skin:     General: Skin is warm and " dry.      Findings: No rash.   Neurological:      Mental Status: He is alert and oriented to person, place, and time.      Coordination: Coordination normal.   Psychiatric:         Thought Content: Thought content normal.         Judgment: Judgment normal.              LABS:  WBC   Date Value Ref Range Status   10/07/2024 4.84 3.90 - 12.70 K/uL Final     Hemoglobin   Date Value Ref Range Status   10/07/2024 12.3 (L) 14.0 - 18.0 g/dL Final     POC Hematocrit   Date Value Ref Range Status   07/11/2013 32 (L) 36 - 54 %PCV Final     Hematocrit   Date Value Ref Range Status   10/07/2024 36.0 (L) 40.0 - 54.0 % Final     Platelets   Date Value Ref Range Status   10/07/2024 130 (L) 150 - 450 K/uL Final     Gran # (ANC)   Date Value Ref Range Status   10/07/2024 2.6 1.8 - 7.7 K/uL Final     Gran %   Date Value Ref Range Status   10/07/2024 53.5 38.0 - 73.0 % Final       Chemistry        Component Value Date/Time     10/07/2024 1120    K 4.9 10/07/2024 1120     10/07/2024 1120    CO2 25 10/07/2024 1120    BUN 23 10/07/2024 1120    CREATININE 1.1 10/07/2024 1120    GLU 87 10/07/2024 1120        Component Value Date/Time    CALCIUM 9.0 10/07/2024 1120    ALKPHOS 105 10/07/2024 1120    AST 24 10/07/2024 1120    ALT 19 10/07/2024 1120    BILITOT 0.4 10/07/2024 1120    ESTGFRAFRICA >60.0 05/31/2022 0918    EGFRNONAA 56.9 (A) 05/31/2022 0918          Assessment and Plan     1. Adenocarcinoma of right lung  -     CBC w/ DIFF; Future; Expected date: 10/10/2024  -     CMP; Future; Expected date: 10/10/2024  -     CT Chest Abdomen Pelvis With IV Contrast (XPD) Routine Oral Contrast; Future; Expected date: 10/10/2024    2. Decreased appetite  -     cyproheptadine (PERIACTIN) 4 mg tablet; Take 1 tablet (4 mg total) by mouth 4 (four) times daily.  Dispense: 120 tablet; Refill: 6        Route Chart for Scheduling    Med Onc Chart Routing      Follow up with physician 3 months. Schedule CBC, CMP, CT chets, abdomen/pelvis and see  me   Follow up with NICOLE    Infusion scheduling note    Injection scheduling note    Labs    Imaging    Pharmacy appointment    Other referrals                    Mr. Grier has completed adjuvant chemotherapy and comes in to review his CT scans. He is noted to have a ground glass lesion in his right lower lobe and a minimal pleural fluid. This was compared to his CT scans before surgery so could be post op changes  Will review at MDT next week and call with consensus   I will plan on tentative CT scans in 3 months  Decreased appetite: escribed periactin    Above care plan was discussed with patient and accompanying wife and all questions were addressed to their satisfaction

## 2024-10-11 ENCOUNTER — PATIENT MESSAGE (OUTPATIENT)
Dept: HEMATOLOGY/ONCOLOGY | Facility: CLINIC | Age: 69
End: 2024-10-11
Payer: MEDICARE

## 2024-10-11 ENCOUNTER — DOCUMENTATION ONLY (OUTPATIENT)
Dept: HEMATOLOGY/ONCOLOGY | Facility: CLINIC | Age: 69
End: 2024-10-11
Payer: MEDICARE

## 2024-10-11 ENCOUNTER — PATIENT MESSAGE (OUTPATIENT)
Dept: CARDIOLOGY | Facility: CLINIC | Age: 69
End: 2024-10-11
Payer: MEDICARE

## 2024-10-11 NOTE — PROGRESS NOTES
Chart reviewed for oncology navigational needs. No needs noted at this time. Dr. Mccray will discuss patient in TB next week. Will follow up for any needs.

## 2024-10-15 ENCOUNTER — TUMOR BOARD CONFERENCE (OUTPATIENT)
Dept: HEMATOLOGY/ONCOLOGY | Facility: CLINIC | Age: 69
End: 2024-10-15
Payer: MEDICARE

## 2024-10-15 NOTE — PROGRESS NOTES
[facility]      THORACIC MULTIDISCIPLINARY TUMOR BOARD  PATIENT REVIEW FORM  ___________________________________________________________________    CLINIC #: [id number]  DATE: 10/15/2024    TUMOR SITE:   Ground glass lesion in his right lower lobe and minimal pleural fluid       Presenting Hospital / Clinic: [facility]     Virtual Tumor Board Conference: In person       PRESENTER:   Presenter: Dr. Janet Mccray     Specialties Present: Medical Oncology; Hematology; Radiation Oncology; Surgical Oncology; Pathology; Navigation; Radiology; Pulmonology       PATIENT SUMMARY:   ONCOLOGIC HISTORY Mr. Grier is a 69 y.o. male smoker s/p OLTX 2013 (HCC), hyperthyroidism, BPH, and hx substance abuse with a new diagnosis of  RUL NSCLC.   Yearly CAP CT per transplant showed right upper lobe cavitary lesion.      PET 1/29/2024 2024: 2.2 cm spiculated, hypermetabolic cavitary right upper lobe pulmonary nodule, highly suspicious for malignancy. No evidence of FDG avid metastatic disease. Bilateral renal calculi and other incidental findings as above.     Percutaneous biopsy on 02/28/2024 positive for adenocarcinoma, lepidic type     On 04/22/2024 he underwent right upper robotic lobectomy.     Pathology reveals: Tumor Focality: Single focus  Tumor Site : upper lobe of lung:  Total Tumor Size :  4.2 cm  Histologic Type:  Invasive acinar adenocarcinoma  Histologic Grade :  G3, poorly differentiated  Visceral Pleura Invasion  :  Not identified  Direct Invasion of Adjacent Structures :   Not applicable (no adjacent structures present)  Treatment Effect :   No known presurgical therapy  Lymphovascular Invasion :   Present (not otherwise specified)  Margin Status for Invasive Carcinoma :  All margins negative for invasive carcinoma ; 3.5 cm to the vascular margin  Lymph Node(s) from Prior Procedures  : left 11, left 10, and 7--no carcinoma  "identified;  Included, but not in count  Regional Lymph Node Status :  All regional lymph nodes negative for tumor  Number of Lymph Nodes with Tumor : 0  Number of Lymph Nodes Examined  : likely 12 in this procedure  Young Site(s) Examined :  Right level 8, level 7, level 2, level 4, level 11, and level 12  Distant Site(s) Involved, if applicable  none known  PATHOLOGIC STAGE CLASSIFICATION (pTNM, AJCC 8th Edition):  pT2b pN0 pMX  Additional Findings:  There is a focal area of considerable emphysema with cavity formation      Case reviewed in thoracic multidisciplinary tumor board on 05/08/2024 and recommendations include "Referral to Med/Onc for discussion of adjuvant systemic therapy for Stage IIB disease"      He started adjuvant chemo with Cisplatin and ALimta on 6/10/2024 and completed 4 cycles as of 8/13/2024       He comes in to review his CT scans from 10/7/2024 which reveal "New right lower lobe 15 mm ground-glass and small right pleural effusion, possibly infectious/inflammatory, neoplastic, or posttraumatic in the setting of a healing posterior right 8th rib fracture.  Recommend follow-up as per oncologic protocol. Intra and extrahepatic biliary ductal dilatation, similar to priors"    Background Information  Patient Status: a current patient  Reason for Consultation: -- (Review CT images)       BOARD RECOMMENDATIONS:   ---CT chest in 3 months        Cancer Staging   Adenocarcinoma of right lung  Staging form: Lung, AJCC 8th Edition  - Pathologic: Stage IIA (pT2b, pN0, cM0) - Signed by Janet Mccray MD on 5/16/2024    Primary lung adenocarcinoma, right  Staging form: Lung, AJCC 8th Edition  - Pathologic stage from 5/3/2024: Stage IIA (pT2b, pN0, cM0) - Signed by Aki Hernadez MD on 5/3/2024             [x] [facility] Treatment Guidelines reviewed and care planned is consistent with guidelines.         (i.e., NCCN, NCI, PD, ACO, AUA, etc.)    PRESENTATION AT CANCER CONFERENCE:   Presentation at Cancer " Conference: Prospective         Julia Villela

## 2024-10-16 ENCOUNTER — TELEPHONE (OUTPATIENT)
Dept: HEMATOLOGY/ONCOLOGY | Facility: CLINIC | Age: 69
End: 2024-10-16
Payer: MEDICARE

## 2024-10-16 NOTE — Clinical Note
Gaby, I tried calling him and could not leave a message  Please make sure he has a follow-up with me in 3 months with a CT chest without contrast

## 2024-10-16 NOTE — TELEPHONE ENCOUNTER
Called him to review MDT recs  Areas in CT scan appear benign so plan on CT chest in 3 months , unable to leave a voicemail as his mailbox is full

## 2024-11-01 ENCOUNTER — PATIENT MESSAGE (OUTPATIENT)
Dept: TRANSPLANT | Facility: CLINIC | Age: 69
End: 2024-11-01
Payer: MEDICARE

## 2024-11-07 ENCOUNTER — HOSPITAL ENCOUNTER (OUTPATIENT)
Dept: CARDIOLOGY | Facility: HOSPITAL | Age: 69
Discharge: HOME | End: 2024-11-07
Attending: INTERNAL MEDICINE
Payer: MEDICARE

## 2024-11-07 DIAGNOSIS — R60.0 BILATERAL LEG EDEMA: ICD-10-CM

## 2024-11-11 ENCOUNTER — TELEPHONE (OUTPATIENT)
Dept: TRANSPLANT | Facility: CLINIC | Age: 69
End: 2024-11-11
Payer: MEDICARE

## 2024-11-11 ENCOUNTER — PATIENT MESSAGE (OUTPATIENT)
Dept: ADMINISTRATIVE | Facility: OTHER | Age: 69
End: 2024-11-11
Payer: MEDICARE

## 2024-11-11 NOTE — LETTER
November 11, 2024    Mario Grier  [address]  [place] [address]          Dear Mario Grier:  MRN: [id number]    This is a follow up to your recent labs, your lab results were stable.  There are no medicine changes.  Please have your labs drawn again on 1/27/25.      If you cannot have your labs drawn on the scheduled date, it is your responsibility to call the transplant department to reschedule.  Please call [phone] and ask to speak to [facility]  for all scheduling requests.     Sincerely,    Marybeth Ruiz, RN,BSN,CCTC    Your Liver Transplant Coordinator    [facility]  [address] [place] [id number]  [phone]

## 2024-11-11 NOTE — TELEPHONE ENCOUNTER
Labs reviewed and are stable, continue labs q 3 months. Letter sent.         ----- Message from Jim Ball MD sent at 11/11/2024 10:03 AM CST -----  Results reviewed. No action.

## 2024-12-05 ENCOUNTER — PATIENT MESSAGE (OUTPATIENT)
Dept: TRANSPLANT | Facility: CLINIC | Age: 69
End: 2024-12-05

## 2024-12-05 ENCOUNTER — TELEPHONE (OUTPATIENT)
Dept: TRANSPLANT | Facility: CLINIC | Age: 69
End: 2024-12-05
Payer: MEDICARE

## 2024-12-05 NOTE — TELEPHONE ENCOUNTER
----- Message from Marybeth Ruiz sent at 12/5/2024 11:08 AM CST -----  Regarding: FW: Consult/Advisory  Contact: :Mario Grier    ----- Message -----  From: Ting Monterroso  Sent: 12/5/2024  10:39 AM CST  To: [facility] Post-Liver Transplant Clinical  Subject: Consult/Advisory                                    Consult/Advisory     Name Of Caller:Mario Grier         Contact Preference:[phone] (home)       Nature of call:Patient is calling about his appt today with  and states no one has called him yet. Requesting a call back ASAP

## 2025-01-06 ENCOUNTER — TELEPHONE (OUTPATIENT)
Dept: HEMATOLOGY/ONCOLOGY | Facility: CLINIC | Age: 70
End: 2025-01-06
Payer: MEDICARE

## 2025-01-06 NOTE — TELEPHONE ENCOUNTER
I attempted to contact the patient to inform him that the only CT that is available is [facility] imaging center his VM is full so I called his spouse and left a detailed VM.

## 2025-01-07 ENCOUNTER — DOCUMENTATION ONLY (OUTPATIENT)
Dept: HEMATOLOGY/ONCOLOGY | Facility: CLINIC | Age: 70
End: 2025-01-07
Payer: MEDICARE

## 2025-01-07 NOTE — NURSING
Chart reviewed for oncology navigational needs. Patient's CT scan was rescheduled to late Jan. I have reached out to Dr. Mccray's team in case his visit with her needs to be rescheduled to after that scan.    Will continue to monitor for any navigational needs.

## 2025-01-11 ENCOUNTER — PATIENT MESSAGE (OUTPATIENT)
Dept: TRANSPLANT | Facility: CLINIC | Age: 70
End: 2025-01-11
Payer: MEDICARE

## 2025-01-14 DIAGNOSIS — I10 HYPERTENSION, UNSPECIFIED TYPE: ICD-10-CM

## 2025-01-15 RX ORDER — OLMESARTAN MEDOXOMIL 5 MG/1
10 TABLET ORAL NIGHTLY
Qty: 60 TABLET | Refills: 2 | Status: SHIPPED | OUTPATIENT
Start: 2025-01-15

## 2025-01-27 ENCOUNTER — HOSPITAL ENCOUNTER (OUTPATIENT)
Dept: RADIOLOGY | Facility: HOSPITAL | Age: 70
Discharge: HOME | End: 2025-01-27
Attending: INTERNAL MEDICINE
Payer: MEDICARE

## 2025-01-27 DIAGNOSIS — C34.91 ADENOCARCINOMA OF RIGHT LUNG: ICD-10-CM

## 2025-01-27 PROCEDURE — 25500020 PHARM REV CODE 255: Mod: PO | Performed by: INTERNAL MEDICINE

## 2025-01-27 PROCEDURE — A9698 NON-RAD CONTRAST MATERIALNOC: HCPCS | Mod: PO | Performed by: INTERNAL MEDICINE

## 2025-01-27 PROCEDURE — 74177 CT ABD & PELVIS W/CONTRAST: CPT | Mod: TC,PO

## 2025-01-27 PROCEDURE — 74177 CT ABD & PELVIS W/CONTRAST: CPT | Mod: 26,,, | Performed by: RADIOLOGY

## 2025-01-27 PROCEDURE — 71260 CT THORAX DX C+: CPT | Mod: 26,,, | Performed by: RADIOLOGY

## 2025-01-27 RX ADMIN — IOHEXOL 75 ML: 350 INJECTION, SOLUTION INTRAVENOUS at 01:01

## 2025-01-27 RX ADMIN — BARIUM SULFATE 900 ML: 20 SUSPENSION ORAL at 01:01

## 2025-01-30 ENCOUNTER — TELEPHONE (OUTPATIENT)
Dept: TRANSPLANT | Facility: CLINIC | Age: 70
End: 2025-01-30
Payer: MEDICARE

## 2025-01-30 ENCOUNTER — TELEPHONE (OUTPATIENT)
Dept: HEMATOLOGY/ONCOLOGY | Facility: CLINIC | Age: 70
End: 2025-01-30
Payer: MEDICARE

## 2025-01-30 DIAGNOSIS — I10 HYPERTENSION, UNSPECIFIED TYPE: Primary | ICD-10-CM

## 2025-01-30 RX ORDER — METOPROLOL SUCCINATE 25 MG/1
TABLET, EXTENDED RELEASE ORAL
Qty: 45 TABLET | Refills: 0 | Status: SHIPPED | OUTPATIENT
Start: 2025-01-30

## 2025-01-30 NOTE — TELEPHONE ENCOUNTER
Called and spoke to pt, he states yes he is taking medication. He states his sleep schedule has been off lately and maybe taking at odd hours. Will repeat labs and notify Dr. Ball.       ----- Message from Jim Ball MD sent at 1/28/2025  1:01 PM CST -----  Results reviewed. Is he taking his sirolimus?

## 2025-01-30 NOTE — TELEPHONE ENCOUNTER
No answer.  full---can't leave message on cell and home.    Spoke with wife.  Patient had family emergency---    Wife said they will call back when the emergency calms down.   Offered to schedule him now with wife.  She declined.

## 2025-01-31 ENCOUNTER — TELEPHONE (OUTPATIENT)
Dept: TRANSPLANT | Facility: CLINIC | Age: 70
End: 2025-01-31
Payer: MEDICARE

## 2025-01-31 NOTE — LETTER
January 31, 2025    Mario Grier  [address]  [place] [address]          Dear Mario Grier:  MRN: [id number]    This is a follow up to your recent labs, your lab results were stable.  There are no medicine changes.  Please have your labs drawn again on 2/24/25.      If you cannot have your labs drawn on the scheduled date, it is your responsibility to call the transplant department to reschedule.  Please call [phone] and ask to speak to [facility]  for all scheduling requests.     Sincerely,    Marybeth Ruiz, RN,BSN,CCTC    Your Liver Transplant Coordinator    [facility]  [address] [place] [id number]  [phone]

## 2025-01-31 NOTE — TELEPHONE ENCOUNTER
Labs reviewed and are stable, pt is taking Sirolimus just sometimes at odd hours.   Will repeat labs in 1 month. Letter sent.     ----- Message from Jim Ball MD sent at 1/31/2025  8:43 AM CST -----  ok  ----- Message -----  From: Marybeth Ruiz RN  Sent: 1/30/2025  10:32 AM CST  To: Jim Ball MD    He said yes, he said his sleep schedule is really off bc of cancer treatment. But he is taking it, I told him we would repeat.  ----- Message -----  From: Jim Ball MD  Sent: 1/28/2025   1:01 PM CST  To: [facility] Post-Liver Transplant Clinical    Results reviewed. Is he taking his sirolimus?

## 2025-02-12 DIAGNOSIS — R63.0 DECREASED APPETITE: ICD-10-CM

## 2025-02-12 RX ORDER — CYPROHEPTADINE HYDROCHLORIDE 4 MG/1
4 TABLET ORAL 4 TIMES DAILY
Qty: 120 TABLET | Refills: 6 | Status: SHIPPED | OUTPATIENT
Start: 2025-02-12

## 2025-02-17 ENCOUNTER — DOCUMENTATION ONLY (OUTPATIENT)
Dept: HEMATOLOGY/ONCOLOGY | Facility: CLINIC | Age: 70
End: 2025-02-17
Payer: MEDICARE

## 2025-02-17 NOTE — NURSING
Chart reviewed for oncology navigational needs. Patient rescheduled his follow up with Dr. Mccray. Will reach out to her staff to see if they want him to have a sooner follow up.  Will continue to follow for any navigational needs.

## 2025-02-20 DIAGNOSIS — R63.0 DECREASED APPETITE: ICD-10-CM

## 2025-02-20 RX ORDER — CYPROHEPTADINE HYDROCHLORIDE 4 MG/1
4 TABLET ORAL 4 TIMES DAILY
Qty: 120 TABLET | Refills: 6 | Status: SHIPPED | OUTPATIENT
Start: 2025-02-20

## 2025-02-21 ENCOUNTER — PATIENT MESSAGE (OUTPATIENT)
Dept: HEMATOLOGY/ONCOLOGY | Facility: CLINIC | Age: 70
End: 2025-02-21
Payer: MEDICARE

## 2025-02-26 DIAGNOSIS — C22.0 HCC (HEPATOCELLULAR CARCINOMA): ICD-10-CM

## 2025-02-26 DIAGNOSIS — Z94.4 LIVER REPLACED BY TRANSPLANT: Primary | ICD-10-CM

## 2025-03-10 ENCOUNTER — TELEPHONE (OUTPATIENT)
Dept: HEMATOLOGY/ONCOLOGY | Facility: CLINIC | Age: 70
End: 2025-03-10
Payer: MEDICARE

## 2025-03-10 NOTE — TELEPHONE ENCOUNTER
----- Message from Sury sent at 3/10/2025  4:31 PM CDT -----  Type:  Patient Returning CallWho Called:  ptWho Left Message for Patient:  Marcia the patient know what this is regarding?:  Best Call Back Number:  [phone] Information:  returning missed call

## 2025-03-11 ENCOUNTER — PATIENT MESSAGE (OUTPATIENT)
Dept: TRANSPLANT | Facility: CLINIC | Age: 70
End: 2025-03-11
Payer: MEDICARE

## 2025-03-11 ENCOUNTER — TELEPHONE (OUTPATIENT)
Dept: HEMATOLOGY/ONCOLOGY | Facility: CLINIC | Age: 70
End: 2025-03-11
Payer: MEDICARE

## 2025-03-11 NOTE — TELEPHONE ENCOUNTER
----- Message from Alberta sent at 3/11/2025  7:54 AM CDT -----  Type: Needs Medical AdviceWho Called:  Patient Symptoms (please be specific): How long has patient had these symptoms:  Pharmacy name and phone #:  Best Call Back Number: [phone] Information: Patient is requesting a call back from Gaby regarding lab work.

## 2025-03-13 ENCOUNTER — OFFICE VISIT (OUTPATIENT)
Dept: HEMATOLOGY/ONCOLOGY | Facility: CLINIC | Age: 70
End: 2025-03-13
Payer: MEDICARE

## 2025-03-13 ENCOUNTER — LAB VISIT (OUTPATIENT)
Dept: LAB | Facility: HOSPITAL | Age: 70
End: 2025-03-13
Attending: INTERNAL MEDICINE
Payer: MEDICARE

## 2025-03-13 VITALS
DIASTOLIC BLOOD PRESSURE: 81 MMHG | BODY MASS INDEX: 19.95 KG/M2 | HEIGHT: 66 IN | SYSTOLIC BLOOD PRESSURE: 131 MMHG | TEMPERATURE: 99 F | RESPIRATION RATE: 20 BRPM | OXYGEN SATURATION: 99 % | WEIGHT: 124.13 LBS | HEART RATE: 60 BPM

## 2025-03-13 DIAGNOSIS — C34.91 PRIMARY LUNG ADENOCARCINOMA, RIGHT: Primary | ICD-10-CM

## 2025-03-13 DIAGNOSIS — Z94.4 LIVER REPLACED BY TRANSPLANT: ICD-10-CM

## 2025-03-13 DIAGNOSIS — C22.0 HCC (HEPATOCELLULAR CARCINOMA): ICD-10-CM

## 2025-03-13 DIAGNOSIS — C34.91 ADENOCARCINOMA OF RIGHT LUNG: ICD-10-CM

## 2025-03-13 LAB
ALBUMIN SERPL BCP-MCNC: 4 G/DL (ref 3.5–5.2)
ALBUMIN SERPL BCP-MCNC: 4 G/DL (ref 3.5–5.2)
ALP SERPL-CCNC: 96 U/L (ref 40–150)
ALP SERPL-CCNC: 96 U/L (ref 40–150)
ALT SERPL W/O P-5'-P-CCNC: 24 U/L (ref 10–44)
ALT SERPL W/O P-5'-P-CCNC: 24 U/L (ref 10–44)
ANION GAP SERPL CALC-SCNC: 8 MMOL/L (ref 8–16)
ANION GAP SERPL CALC-SCNC: 8 MMOL/L (ref 8–16)
AST SERPL-CCNC: 24 U/L (ref 10–40)
AST SERPL-CCNC: 24 U/L (ref 10–40)
BASOPHILS # BLD AUTO: 0.02 K/UL (ref 0–0.2)
BASOPHILS # BLD AUTO: 0.02 K/UL (ref 0–0.2)
BASOPHILS NFR BLD: 0.4 % (ref 0–1.9)
BASOPHILS NFR BLD: 0.4 % (ref 0–1.9)
BILIRUB SERPL-MCNC: 0.5 MG/DL (ref 0.1–1)
BILIRUB SERPL-MCNC: 0.5 MG/DL (ref 0.1–1)
BUN SERPL-MCNC: 27 MG/DL (ref 8–23)
BUN SERPL-MCNC: 27 MG/DL (ref 8–23)
CALCIUM SERPL-MCNC: 8.9 MG/DL (ref 8.7–10.5)
CALCIUM SERPL-MCNC: 8.9 MG/DL (ref 8.7–10.5)
CHLORIDE SERPL-SCNC: 104 MMOL/L (ref 95–110)
CHLORIDE SERPL-SCNC: 104 MMOL/L (ref 95–110)
CO2 SERPL-SCNC: 24 MMOL/L (ref 23–29)
CO2 SERPL-SCNC: 24 MMOL/L (ref 23–29)
CREAT SERPL-MCNC: 1.2 MG/DL (ref 0.5–1.4)
CREAT SERPL-MCNC: 1.2 MG/DL (ref 0.5–1.4)
DIFFERENTIAL METHOD BLD: ABNORMAL
DIFFERENTIAL METHOD BLD: ABNORMAL
EOSINOPHIL # BLD AUTO: 0.1 K/UL (ref 0–0.5)
EOSINOPHIL # BLD AUTO: 0.1 K/UL (ref 0–0.5)
EOSINOPHIL NFR BLD: 2.1 % (ref 0–8)
EOSINOPHIL NFR BLD: 2.1 % (ref 0–8)
ERYTHROCYTE [DISTWIDTH] IN BLOOD BY AUTOMATED COUNT: 13.2 % (ref 11.5–14.5)
ERYTHROCYTE [DISTWIDTH] IN BLOOD BY AUTOMATED COUNT: 13.2 % (ref 11.5–14.5)
EST. GFR  (NO RACE VARIABLE): >60 ML/MIN/1.73 M^2
EST. GFR  (NO RACE VARIABLE): >60 ML/MIN/1.73 M^2
GLUCOSE SERPL-MCNC: 114 MG/DL (ref 70–110)
GLUCOSE SERPL-MCNC: 114 MG/DL (ref 70–110)
HCT VFR BLD AUTO: 35.7 % (ref 40–54)
HCT VFR BLD AUTO: 35.7 % (ref 40–54)
HGB BLD-MCNC: 12.3 G/DL (ref 14–18)
HGB BLD-MCNC: 12.3 G/DL (ref 14–18)
IMM GRANULOCYTES # BLD AUTO: 0.01 K/UL (ref 0–0.04)
IMM GRANULOCYTES # BLD AUTO: 0.01 K/UL (ref 0–0.04)
IMM GRANULOCYTES NFR BLD AUTO: 0.2 % (ref 0–0.5)
IMM GRANULOCYTES NFR BLD AUTO: 0.2 % (ref 0–0.5)
INR PPP: 1.1 (ref 0.8–1.2)
LYMPHOCYTES # BLD AUTO: 0.9 K/UL (ref 1–4.8)
LYMPHOCYTES # BLD AUTO: 0.9 K/UL (ref 1–4.8)
LYMPHOCYTES NFR BLD: 17.8 % (ref 18–48)
LYMPHOCYTES NFR BLD: 17.8 % (ref 18–48)
MCH RBC QN AUTO: 33.2 PG (ref 27–31)
MCH RBC QN AUTO: 33.2 PG (ref 27–31)
MCHC RBC AUTO-ENTMCNC: 34.5 G/DL (ref 32–36)
MCHC RBC AUTO-ENTMCNC: 34.5 G/DL (ref 32–36)
MCV RBC AUTO: 97 FL (ref 82–98)
MCV RBC AUTO: 97 FL (ref 82–98)
MONOCYTES # BLD AUTO: 0.4 K/UL (ref 0.3–1)
MONOCYTES # BLD AUTO: 0.4 K/UL (ref 0.3–1)
MONOCYTES NFR BLD: 7.7 % (ref 4–15)
MONOCYTES NFR BLD: 7.7 % (ref 4–15)
NEUTROPHILS # BLD AUTO: 3.7 K/UL (ref 1.8–7.7)
NEUTROPHILS # BLD AUTO: 3.7 K/UL (ref 1.8–7.7)
NEUTROPHILS NFR BLD: 71.8 % (ref 38–73)
NEUTROPHILS NFR BLD: 71.8 % (ref 38–73)
NRBC BLD-RTO: 0 /100 WBC
NRBC BLD-RTO: 0 /100 WBC
PLATELET # BLD AUTO: 130 K/UL (ref 150–450)
PLATELET # BLD AUTO: 130 K/UL (ref 150–450)
PMV BLD AUTO: 10.7 FL (ref 9.2–12.9)
PMV BLD AUTO: 10.7 FL (ref 9.2–12.9)
POTASSIUM SERPL-SCNC: 5.5 MMOL/L (ref 3.5–5.1)
POTASSIUM SERPL-SCNC: 5.5 MMOL/L (ref 3.5–5.1)
PROT SERPL-MCNC: 7.6 G/DL (ref 6–8.4)
PROT SERPL-MCNC: 7.6 G/DL (ref 6–8.4)
PROTHROMBIN TIME: 11.7 SEC (ref 9–12.5)
RBC # BLD AUTO: 3.7 M/UL (ref 4.6–6.2)
RBC # BLD AUTO: 3.7 M/UL (ref 4.6–6.2)
SODIUM SERPL-SCNC: 136 MMOL/L (ref 136–145)
SODIUM SERPL-SCNC: 136 MMOL/L (ref 136–145)
WBC # BLD AUTO: 5.17 K/UL (ref 3.9–12.7)
WBC # BLD AUTO: 5.17 K/UL (ref 3.9–12.7)

## 2025-03-13 PROCEDURE — 99214 OFFICE O/P EST MOD 30 MIN: CPT | Mod: S$GLB,,, | Performed by: INTERNAL MEDICINE

## 2025-03-13 PROCEDURE — 3079F DIAST BP 80-89 MM HG: CPT | Mod: CPTII,S$GLB,, | Performed by: INTERNAL MEDICINE

## 2025-03-13 PROCEDURE — 1159F MED LIST DOCD IN RCRD: CPT | Mod: CPTII,S$GLB,, | Performed by: INTERNAL MEDICINE

## 2025-03-13 PROCEDURE — 3075F SYST BP GE 130 - 139MM HG: CPT | Mod: CPTII,S$GLB,, | Performed by: INTERNAL MEDICINE

## 2025-03-13 PROCEDURE — 36415 COLL VENOUS BLD VENIPUNCTURE: CPT | Mod: PN | Performed by: INTERNAL MEDICINE

## 2025-03-13 PROCEDURE — 85025 COMPLETE CBC W/AUTO DIFF WBC: CPT | Mod: PN | Performed by: INTERNAL MEDICINE

## 2025-03-13 PROCEDURE — 1126F AMNT PAIN NOTED NONE PRSNT: CPT | Mod: CPTII,S$GLB,, | Performed by: INTERNAL MEDICINE

## 2025-03-13 PROCEDURE — 80053 COMPREHEN METABOLIC PANEL: CPT | Mod: PN | Performed by: INTERNAL MEDICINE

## 2025-03-13 PROCEDURE — 1101F PT FALLS ASSESS-DOCD LE1/YR: CPT | Mod: CPTII,S$GLB,, | Performed by: INTERNAL MEDICINE

## 2025-03-13 PROCEDURE — 99999 PR PBB SHADOW E&M-EST. PATIENT-LVL V: CPT | Mod: PBBFAC,,, | Performed by: INTERNAL MEDICINE

## 2025-03-13 PROCEDURE — 80195 ASSAY OF SIROLIMUS: CPT | Performed by: INTERNAL MEDICINE

## 2025-03-13 PROCEDURE — 3008F BODY MASS INDEX DOCD: CPT | Mod: CPTII,S$GLB,, | Performed by: INTERNAL MEDICINE

## 2025-03-13 PROCEDURE — 85610 PROTHROMBIN TIME: CPT | Performed by: INTERNAL MEDICINE

## 2025-03-13 PROCEDURE — 4010F ACE/ARB THERAPY RXD/TAKEN: CPT | Mod: CPTII,S$GLB,, | Performed by: INTERNAL MEDICINE

## 2025-03-13 PROCEDURE — 3288F FALL RISK ASSESSMENT DOCD: CPT | Mod: CPTII,S$GLB,, | Performed by: INTERNAL MEDICINE

## 2025-03-13 PROCEDURE — 1157F ADVNC CARE PLAN IN RCRD: CPT | Mod: CPTII,S$GLB,, | Performed by: INTERNAL MEDICINE

## 2025-03-13 NOTE — PROGRESS NOTES
Subjective     Patient ID: Mario Grier is a 70 y.o. male.    Chief Complaint: Follow-up  ONCOLOGIC HISTORY Mr. Grier is a 70 y.o. male smoker s/p OLTX 2013 (HCC), hyperthyroidism, BPH, and hx substance abuse with a new diagnosis of  RUL NSCLC.   Yearly CAP CT per transplant showed right upper lobe cavitary lesion.      PET 1/29/2024 2024: 2.2 cm spiculated, hypermetabolic cavitary right upper lobe pulmonary nodule, highly suspicious for malignancy. No evidence of FDG avid metastatic disease. Bilateral renal calculi and other incidental findings as above.     Percutaneous biopsy on 02/28/2024 positive for adenocarcinoma, lepidic type     On 04/22/2024 he underwent right upper robotic lobectomy.     Pathology reveals: Tumor Focality: Single focus  Tumor Site : upper lobe of lung:  Total Tumor Size :  4.2 cm  Histologic Type:  Invasive acinar adenocarcinoma  Histologic Grade :  G3, poorly differentiated  Visceral Pleura Invasion  :  Not identified  Direct Invasion of Adjacent Structures :   Not applicable (no adjacent structures present)  Treatment Effect :   No known presurgical therapy  Lymphovascular Invasion :   Present (not otherwise specified)  Margin Status for Invasive Carcinoma :  All margins negative for invasive carcinoma ; 3.5 cm to the vascular margin  Lymph Node(s) from Prior Procedures  : left 11, left 10, and 7--no carcinoma identified;  Included, but not in count  Regional Lymph Node Status :  All regional lymph nodes negative for tumor  Number of Lymph Nodes with Tumor : 0  Number of Lymph Nodes Examined  : likely 12 in this procedure  Young Site(s) Examined :  Right level 8, level 7, level 2, level 4, level 11, and level 12  Distant Site(s) Involved, if applicable  none known  PATHOLOGIC STAGE CLASSIFICATION (pTNM, AJCC 8th Edition):  pT2b pN0 pMX  Additional Findings:  There is a focal area of considerable emphysema with cavity formation      Case reviewed in thoracic multidisciplinary tumor  "board on 05/08/2024 and recommendations include "Referral to Med/Onc for discussion of adjuvant systemic therapy for Stage IIB disease"        He started adjuvant chemo with Cisplatin and ALimta on 6/10/2024 and completed 4 cycles as of 8/13/2024            His CT scans from 10/7/2024 revealed "New right lower lobe 15 mm ground-glass and small right pleural effusion, possibly infectious/inflammatory, neoplastic, or posttraumatic in the setting of a healing posterior right 8th rib fracture.  Recommend follow-up as per oncologic protocol. Intra and extrahepatic biliary ductal dilatation, similar to priors"    He is on surveillance       HPIHe comes in to review his CT CAP on 1/17/2025 T"here is been interval improvement in aeration at the right lung base since 10/07/2024 without worrisome detrimental change.  Several small pulmonary nodules are noted the largest of 4 mm new since 03/15/2024 but stable since 10/07/2024.  Continued longer term follow-up for size stability would be suggested.  2. Decreased liver density as can be seen with fatty infiltration of the liver  3. Prominent common bile duct and intrahepatic biliary ducts, please correlate for a distal biliary obstructive type process.  This appears dilated to the level of the ampulla.  Direct visualization of the duodenal ampulla may be of use but biliary dilatation appears to have been present since at least 12/11/2023  4. Heterogeneous prostate, correlation with PSA may be of use.  5. Renal hypodensities bilaterally statistically favored relate to cysts several too small to accurately categorize  6. Nonobstructive left renal stone  7. Fusion of the SI joints as can be seen with sacroiliitis or degenerative change  8. Degenerative changes in the spine with central canal narrowing at the L4-L5 level suspected.  If necessary dedicated imaging could be performed"    Review of Systems   Constitutional:  Negative for appetite change, fatigue and unexpected weight " change.   HENT:  Negative for mouth sores.    Eyes:  Negative for visual disturbance.   Respiratory:  Negative for cough and shortness of breath.    Cardiovascular:  Negative for chest pain.   Gastrointestinal:  Negative for abdominal pain and diarrhea.   Genitourinary:  Negative for frequency.   Musculoskeletal:  Negative for back pain.   Integumentary:  Negative for rash.   Neurological:  Negative for headaches.   Hematological:  Negative for adenopathy.   Psychiatric/Behavioral:  The patient is not nervous/anxious.    All other systems reviewed and are negative.         Objective     Physical Exam  Vitals reviewed.   Constitutional:       Appearance: He is well-developed.   HENT:      Mouth/Throat:      Pharynx: No oropharyngeal exudate.   Cardiovascular:      Rate and Rhythm: Normal rate.      Heart sounds: Normal heart sounds.   Pulmonary:      Effort: Pulmonary effort is normal.      Breath sounds: Normal breath sounds. No wheezing.   Abdominal:      General: Bowel sounds are normal.      Palpations: Abdomen is soft.      Tenderness: There is no abdominal tenderness.   Musculoskeletal:         General: No tenderness.   Lymphadenopathy:      Cervical: No cervical adenopathy.   Skin:     General: Skin is warm and dry.      Findings: No rash.   Neurological:      Mental Status: He is alert and oriented to person, place, and time.      Coordination: Coordination normal.   Psychiatric:         Thought Content: Thought content normal.         Judgment: Judgment normal.           LABS:  WBC   Date Value Ref Range Status   03/13/2025 5.17 3.90 - 12.70 K/uL Final   03/13/2025 5.17 3.90 - 12.70 K/uL Final     Hemoglobin   Date Value Ref Range Status   03/13/2025 12.3 (L) 14.0 - 18.0 g/dL Final   03/13/2025 12.3 (L) 14.0 - 18.0 g/dL Final     POC Hematocrit   Date Value Ref Range Status   07/11/2013 32 (L) 36 - 54 %PCV Final     Hematocrit   Date Value Ref Range Status   03/13/2025 35.7 (L) 40.0 - 54.0 % Final    03/13/2025 35.7 (L) 40.0 - 54.0 % Final     Platelets   Date Value Ref Range Status   03/13/2025 130 (L) 150 - 450 K/uL Final   03/13/2025 130 (L) 150 - 450 K/uL Final     Gran # (ANC)   Date Value Ref Range Status   03/13/2025 3.7 1.8 - 7.7 K/uL Final   03/13/2025 3.7 1.8 - 7.7 K/uL Final     Gran %   Date Value Ref Range Status   03/13/2025 71.8 38.0 - 73.0 % Final   03/13/2025 71.8 38.0 - 73.0 % Final       Chemistry        Component Value Date/Time     03/13/2025 1539     03/13/2025 1539    K 5.5 (H) 03/13/2025 1539    K 5.5 (H) 03/13/2025 1539     03/13/2025 1539     03/13/2025 1539    CO2 24 03/13/2025 1539    CO2 24 03/13/2025 1539    BUN 27 (H) 03/13/2025 1539    BUN 27 (H) 03/13/2025 1539    CREATININE 1.2 03/13/2025 1539    CREATININE 1.2 03/13/2025 1539     (H) 03/13/2025 1539     (H) 03/13/2025 1539        Component Value Date/Time    CALCIUM 8.9 03/13/2025 1539    CALCIUM 8.9 03/13/2025 1539    ALKPHOS 96 03/13/2025 1539    ALKPHOS 96 03/13/2025 1539    AST 24 03/13/2025 1539    AST 24 03/13/2025 1539    ALT 24 03/13/2025 1539    ALT 24 03/13/2025 1539    BILITOT 0.5 03/13/2025 1539    BILITOT 0.5 03/13/2025 1539    ESTGFRAFRICA >60.0 05/31/2022 0918    EGFRNONAA 56.9 (A) 05/31/2022 0918             Assessment and Plan     1. Primary lung adenocarcinoma, right  -     CBC w/ DIFF; Future; Expected date: 03/13/2025  -     CMP; Future; Expected date: 03/13/2025  -     CT Chest Abdomen Pelvis With IV Contrast (XPD) Routine Oral Contrast; Future; Expected date: 03/13/2025    2. Liver replaced by transplant  Overview:  HCV stage 2 grade 1          Route Chart for Scheduling    Med Onc Chart Routing      Follow up with physician 4 months. Schedule CBc, CMP, CT chest, abdomen/pelvis and see me   Follow up with NICOLE    Infusion scheduling note    Injection scheduling note    Labs    Imaging    Pharmacy appointment    Other referrals              Mr. Grier is doing well  overall reviewed CT scans which are stable. He will return in 3 months with labs and scans    Liver Transplant: follows with transplant    Above care plan was discussed with patient  and all questions were addressed to his satisfaction

## 2025-03-14 ENCOUNTER — RESULTS FOLLOW-UP (OUTPATIENT)
Dept: HEPATOLOGY | Facility: CLINIC | Age: 70
End: 2025-03-14
Payer: MEDICARE

## 2025-03-14 ENCOUNTER — DOCUMENTATION ONLY (OUTPATIENT)
Dept: HEMATOLOGY/ONCOLOGY | Facility: CLINIC | Age: 70
End: 2025-03-14
Payer: MEDICARE

## 2025-03-14 LAB — SIROLIMUS BLD-MCNC: 4.5 NG/ML (ref 4–20)

## 2025-03-14 NOTE — PROGRESS NOTES
Chart reviewed for oncology navigational needs. Patient remains on surveillance. Future imaging and follow up already scheduled.   No needs noted at this time.

## 2025-03-17 NOTE — TELEPHONE ENCOUNTER
Labs reviewed and are stable, continue labs q 3 months. Letter sent.     ----- Message from Jim Ball MD sent at 3/16/2025 11:44 AM CDT -----  Results reviewed. No action.  ----- Message -----  From: [facility]  Sent: 3/13/2025   3:47 PM CDT  To: Jim Ball MD

## 2025-03-18 ENCOUNTER — PATIENT MESSAGE (OUTPATIENT)
Dept: TRANSPLANT | Facility: CLINIC | Age: 70
End: 2025-03-18
Payer: MEDICARE

## 2025-03-24 ENCOUNTER — TELEPHONE (OUTPATIENT)
Dept: CARDIOLOGY | Facility: CLINIC | Age: 70
End: 2025-03-24
Payer: MEDICARE

## 2025-03-24 DIAGNOSIS — Z94.4 LIVER REPLACED BY TRANSPLANT: Primary | ICD-10-CM

## 2025-03-24 DIAGNOSIS — Z00.5 HEPATITIS B VIRUS INFECTION IN CADAVERIC DONOR: ICD-10-CM

## 2025-03-24 DIAGNOSIS — B19.10 HEPATITIS B VIRUS INFECTION IN CADAVERIC DONOR: ICD-10-CM

## 2025-03-24 NOTE — TELEPHONE ENCOUNTER
----- Message from Obdulia sent at 3/24/2025 10:54 AM CDT -----  Contact: Patient  Type:  Same Day Appointment RequestCaller is requesting a same day appointment.  Caller declined first available appointment listed below.  Name of Caller:  Rachel is the first available appointment?   N/ABest Call Back Number:  [phone] Information:   States he would like to speak with someone to make his appointment he missed at 10:45 into a virtual today - please call - thank you

## 2025-03-27 ENCOUNTER — PATIENT MESSAGE (OUTPATIENT)
Dept: TRANSPLANT | Facility: CLINIC | Age: 70
End: 2025-03-27
Payer: MEDICARE

## 2025-06-02 ENCOUNTER — TELEPHONE (OUTPATIENT)
Dept: HEMATOLOGY/ONCOLOGY | Facility: CLINIC | Age: 70
End: 2025-06-02
Payer: MEDICARE

## 2025-06-02 ENCOUNTER — TELEPHONE (OUTPATIENT)
Dept: TRANSPLANT | Facility: CLINIC | Age: 70
End: 2025-06-02
Payer: MEDICARE

## 2025-06-03 DIAGNOSIS — Z20.6 CONTACT WITH AND (SUSPECTED) EXPOSURE TO HUMAN IMMUNODEFICIENCY VIRUS (HIV): ICD-10-CM

## 2025-06-03 RX ORDER — EMTRICITABINE AND TENOFOVIR DISOPROXIL FUMARATE 200; 300 MG/1; MG/1
1 TABLET, FILM COATED ORAL DAILY
Qty: 30 TABLET | Refills: 0 | Status: SHIPPED | OUTPATIENT
Start: 2025-06-03

## 2025-06-06 ENCOUNTER — LAB VISIT (OUTPATIENT)
Dept: LAB | Facility: HOSPITAL | Age: 70
End: 2025-06-06
Payer: MEDICARE

## 2025-06-06 ENCOUNTER — RESULTS FOLLOW-UP (OUTPATIENT)
Dept: CARDIOLOGY | Facility: CLINIC | Age: 70
End: 2025-06-06

## 2025-06-06 ENCOUNTER — OFFICE VISIT (OUTPATIENT)
Dept: CARDIOLOGY | Facility: CLINIC | Age: 70
End: 2025-06-06
Payer: MEDICARE

## 2025-06-06 VITALS
DIASTOLIC BLOOD PRESSURE: 56 MMHG | HEIGHT: 66 IN | WEIGHT: 127.63 LBS | HEART RATE: 47 BPM | SYSTOLIC BLOOD PRESSURE: 92 MMHG | BODY MASS INDEX: 20.51 KG/M2

## 2025-06-06 DIAGNOSIS — R55 NEAR SYNCOPE: ICD-10-CM

## 2025-06-06 DIAGNOSIS — E05.00 GRAVES DISEASE: ICD-10-CM

## 2025-06-06 DIAGNOSIS — R31.9 HEMATURIA, UNSPECIFIED TYPE: ICD-10-CM

## 2025-06-06 DIAGNOSIS — I34.0 NONRHEUMATIC MITRAL VALVE REGURGITATION: ICD-10-CM

## 2025-06-06 DIAGNOSIS — I95.9 HYPOTENSION, UNSPECIFIED HYPOTENSION TYPE: ICD-10-CM

## 2025-06-06 DIAGNOSIS — I95.9 HYPOTENSION, UNSPECIFIED HYPOTENSION TYPE: Primary | ICD-10-CM

## 2025-06-06 DIAGNOSIS — R00.1 BRADYCARDIA: ICD-10-CM

## 2025-06-06 PROBLEM — I80.9: Status: RESOLVED | Noted: 2024-07-17 | Resolved: 2025-06-06

## 2025-06-06 PROBLEM — T81.72XA: Status: RESOLVED | Noted: 2024-07-17 | Resolved: 2025-06-06

## 2025-06-06 LAB
ABSOLUTE EOSINOPHIL (OHS): 0.12 K/UL
ABSOLUTE MONOCYTE (OHS): 0.41 K/UL (ref 0.3–1)
ABSOLUTE NEUTROPHIL COUNT (OHS): 1.81 K/UL (ref 1.8–7.7)
BASOPHILS # BLD AUTO: 0.02 K/UL
BASOPHILS NFR BLD AUTO: 0.6 %
ERYTHROCYTE [DISTWIDTH] IN BLOOD BY AUTOMATED COUNT: 12.9 % (ref 11.5–14.5)
HCT VFR BLD AUTO: 29.5 % (ref 40–54)
HGB BLD-MCNC: 9.9 GM/DL (ref 14–18)
IMM GRANULOCYTES # BLD AUTO: 0.01 K/UL (ref 0–0.04)
IMM GRANULOCYTES NFR BLD AUTO: 0.3 % (ref 0–0.5)
LYMPHOCYTES # BLD AUTO: 0.96 K/UL (ref 1–4.8)
MCH RBC QN AUTO: 32.7 PG (ref 27–31)
MCHC RBC AUTO-ENTMCNC: 33.6 G/DL (ref 32–36)
MCV RBC AUTO: 97 FL (ref 82–98)
NUCLEATED RBC (/100WBC) (OHS): 0 /100 WBC
PLATELET # BLD AUTO: 120 K/UL (ref 150–450)
PMV BLD AUTO: 12.1 FL (ref 9.2–12.9)
RBC # BLD AUTO: 3.03 M/UL (ref 4.6–6.2)
RELATIVE EOSINOPHIL (OHS): 3.6 %
RELATIVE LYMPHOCYTE (OHS): 28.8 % (ref 18–48)
RELATIVE MONOCYTE (OHS): 12.3 % (ref 4–15)
RELATIVE NEUTROPHIL (OHS): 54.4 % (ref 38–73)
TSH SERPL-ACNC: 2.06 UIU/ML (ref 0.4–4)
WBC # BLD AUTO: 3.33 K/UL (ref 3.9–12.7)

## 2025-06-06 PROCEDURE — 99999 PR PBB SHADOW E&M-EST. PATIENT-LVL IV: CPT | Mod: PBBFAC,,,

## 2025-06-06 PROCEDURE — 36415 COLL VENOUS BLD VENIPUNCTURE: CPT | Mod: PO

## 2025-06-06 PROCEDURE — 85025 COMPLETE CBC W/AUTO DIFF WBC: CPT

## 2025-06-06 PROCEDURE — 84443 ASSAY THYROID STIM HORMONE: CPT

## 2025-06-10 ENCOUNTER — PATIENT MESSAGE (OUTPATIENT)
Dept: HEMATOLOGY/ONCOLOGY | Facility: CLINIC | Age: 70
End: 2025-06-10
Payer: MEDICARE

## 2025-06-12 ENCOUNTER — PATIENT MESSAGE (OUTPATIENT)
Dept: ADMINISTRATIVE | Facility: OTHER | Age: 70
End: 2025-06-12
Payer: MEDICARE

## 2025-06-12 NOTE — TELEPHONE ENCOUNTER
6/12/2025 6/11/2025 6/10/2025 6/9/2025 6/8/2025 6/7/2025 6/6/2025                                    SBP (mmHg) 154 Abnormally high   161 Abnormally high   171 Abnormally high  94  93  105  79 129  145 Abnormally high   161 Abnormally high   171 Abnormally high   163 Abnormally high   168 Abnormally high  133  113 135  138  138 149 Abnormally high   147 Abnormally high   144 Abnormally high        DBP (mmHg) 85  85  88 54  51  62  48 72  81  83  87  88  86 75  55 78  79  82 82  82  61       Pulse (bpm) 52  56  57 44  43  42  45 60  60  57  58  60  60 63  56 46  46  52 61  67  67

## 2025-06-20 ENCOUNTER — HOSPITAL ENCOUNTER (OUTPATIENT)
Dept: CARDIOLOGY | Facility: HOSPITAL | Age: 70
Discharge: HOME | End: 2025-06-20
Payer: MEDICARE

## 2025-06-20 ENCOUNTER — TELEPHONE (OUTPATIENT)
Dept: HEMATOLOGY/ONCOLOGY | Facility: CLINIC | Age: 70
End: 2025-06-20
Payer: MEDICARE

## 2025-06-20 VITALS — WEIGHT: 127 LBS | HEIGHT: 66 IN | BODY MASS INDEX: 20.41 KG/M2

## 2025-06-20 DIAGNOSIS — R31.0 GROSS HEMATURIA: Primary | ICD-10-CM

## 2025-06-20 DIAGNOSIS — R55 NEAR SYNCOPE: ICD-10-CM

## 2025-06-20 LAB
CV STRESS BASE HR: 52 BPM
DIASTOLIC BLOOD PRESSURE: 72 MMHG
OHS CV CPX 1 MINUTE RECOVERY HEART RATE: 89 BPM
OHS CV CPX 85 PERCENT MAX PREDICTED HEART RATE MALE: 128
OHS CV CPX ESTIMATED METS: 10
OHS CV CPX MAX PREDICTED HEART RATE: 151
OHS CV CPX PATIENT IS FEMALE: 0
OHS CV CPX PATIENT IS MALE: 1
OHS CV CPX PEAK DIASTOLIC BLOOD PRESSURE: 91 MMHG
OHS CV CPX PEAK HEAR RATE: 116 BPM
OHS CV CPX PEAK RATE PRESSURE PRODUCT: NORMAL
OHS CV CPX PEAK SYSTOLIC BLOOD PRESSURE: 196 MMHG
OHS CV CPX PERCENT MAX PREDICTED HEART RATE ACHIEVED: 77
OHS CV CPX RATE PRESSURE PRODUCT PRESENTING: 5720
STRESS ECHO POST EXERCISE DUR MIN: 5 MINUTES
STRESS ECHO POST EXERCISE DUR SEC: 38 SECONDS
SYSTOLIC BLOOD PRESSURE: 110 MMHG

## 2025-06-20 PROCEDURE — 93018 CV STRESS TEST I&R ONLY: CPT | Mod: ,,, | Performed by: INTERNAL MEDICINE

## 2025-06-20 PROCEDURE — 93016 CV STRESS TEST SUPVJ ONLY: CPT | Mod: ,,, | Performed by: INTERNAL MEDICINE

## 2025-06-20 PROCEDURE — 93017 CV STRESS TEST TRACING ONLY: CPT | Mod: PO

## 2025-06-20 NOTE — TELEPHONE ENCOUNTER
----- Message from Marybeth Ruiz sent at 6/20/2025  9:21 AM CDT -----  Hello, I got a call from pt's local urologist's office. Dr. Oleary is asking since Dr. Mccray has a CT scan already scheduled on 7/7 , can his images that he needs be added. He is asking for a CT EXU , or CT urogram. It is a  CT abd and pelvis with and without contrast to evaluate if its stones vs. Masses.       Can you let his office know if this is possible? Thank you   Her number [phone]. Stephy.

## 2025-06-25 DIAGNOSIS — Z94.4 LIVER REPLACED BY TRANSPLANT: ICD-10-CM

## 2025-06-26 NOTE — PROGRESS NOTES
Transplant Hepatology  Liver Transplant Recipient Follow-up    Transplant Date: 2013  UNOS Native Liver Dx: Primary Liver Malignancy: Hepatoma (HCC) and Cirrhosis    Mario is here for follow up of his liver transplant.    ORGAN: LIVER  Whole or Partial: whole liver  Donor Type:  - brain death  CDC High Risk: no  Donor CMV Status: Negative  Donor HCV Status: Positive  Donor HBcAb: Positive  Donor HBV TRUONG: Positive  Donor HCV TRUONG:   Biliary Anastomosis: end to end  Arterial Anatomy: replaced left hepatic from left gastric  IVC reconstruction: end to end ivc  Portal vein status: patent    He has had the following complications since transplant: recurrent hepatitis C. The noted complications are well controlled. HCV s/p treatment with Harvoni with successful SVR.     Subjective:     Interval History: Mario was last seen on 2017 by Ranjana Abel NP. He is now 5 years post liver transplant for cirrhosis 2/2 HCV/alcohol and HCC. Currently, he is doing well. Current complaints include decreased libido. Also having trouble gaining weight though is not overly hungry and does not eat much.       Scheduled to participate in smoking cessation program this month. Reports having an occasional alcoholic beverage or beer but no more than 1 drink.       Current immunosuppression:   - Tacrolimus: 1 mg BID. Last prograf level 4.8.                                      - MMF: 500 mg BID  (Per chart review, MMF added due to liver biopsy in 2016 showing chronic hepatitis and some plasma cells, ? chronic AMR)       *On Truvada for HBV prophylaxis - Donor liver initially HBcAb+ and HBsAg- though during hospitalization, donor HBV DNA by PCR resulted (+). HBV serologies remain negative.                                                                          Graft Function: excellent; LFTs normal  Renal function stable, creatinine 1.1.       Post-LT Complications  Acute cellular rejection:                        "no  Antibody-mediated rejection:               no  Biliary anastomotic stricture:               no  HAT:                                                     no  HA stenosis:                                        no  PV stenosis:                                        no  CMV reactivation:                                no  PTLD:                                                  no  Other malignancies:                            no     Post-LT Recommended Health Maintenance  Dermatology:                                       Yes, due for follow-up     Colonoscopy:                                       Yes, per patient done in [place] ~5 years ago. No abnormal findings  Bone Mineral Density (BMD):              Yes, 4/2018 - osteopenia. Not currently taking Ca/Vit D  BP:                                                       132/77 today      Weight:                                                 Body mass index is 21.32 kg/m².      Review of Systems   GENERAL: Denies fever, chills, weight loss/gain, fatigue  HEENT: Denies headaches, dizziness, vision/hearing changes  CARDIOVASCULAR: Denies chest pain, palpitations, or edema  RESPIRATORY: Denies dyspnea, cough  GI: Denies abdominal pain, rectal bleeding, nausea, vomiting. No change in bowel pattern or color  : Denies dysuria, hematuria. Decreased libido    SKIN: Denies rash, itching   NEURO: Denies confusion, memory loss, or mood changes  PSYCH: Denies depression. (+) anxiety  HEME/LYMPH: Denies easy bruising or bleeding  MSK: Denies any joint pain or myalgias.        Objective:     Physical Exam   Friendly White male, in no acute distress; alert and oriented to person, place and time  VITALS: /77 (BP Location: Right arm, Patient Position: Sitting, BP Method: Medium (Automatic))   Pulse (!) 48   Temp 97.9 °F (36.6 °C) (Oral)   Resp 18   Ht 5' 7.52" (1.715 m)   Wt 62.7 kg (138 lb 3.7 oz)   SpO2 98%   BMI 21.32 kg/m²   HENT: Normocephalic, without obvious " abnormality. Oral mucosa pink and moist. Dentition good.  EYES: Sclerae anicteric. No conjunctival pallor.   NECK: Supple. No masses or cervical adenopathy.  CARDIOVASCULAR: Regular rate and rhythm. No murmurs.  RESPIRATORY: Normal respiratory effort. BBS CTA. No wheezes or crackles.  GI: Soft, non-tender, non-distended. No hepatosplenomegaly. No masses palpable. No ascites. Chevron incision CDI, well healed, no hernias appreciated.   EXTREMITIES:  No clubbing, cyanosis or edema.  SKIN: Warm and dry. No jaundice. No rashes noted to exposed skin. No telangectasias noted. No palmar erythema.  NEURO:  Normal gait. No asterixis.  PSYCH:  Memory intact. Thought and speech pattern appropriate. Behavior normal. No depression or anxiety noted.      Lab Results   Component Value Date    BILITOT 0.8 12/04/2018    AST 20 12/04/2018    ALT 13 12/04/2018    ALKPHOS 73 12/04/2018    CREATININE 1.1 12/04/2018    ALBUMIN 3.9 12/04/2018     Lab Results   Component Value Date    WBC 4.62 12/04/2018    HGB 13.9 (L) 12/04/2018    HCT 41.3 12/04/2018    HCT 32 (L) 07/11/2013     12/04/2018     Lab Results   Component Value Date    TACROLIMUS 4.8 (L) 12/04/2018       CT abd pelvis w/wo contrast - 4/27/18  FINDINGS:  Visualized neck is unremarkable.    Heart: Normal in size. No pericardial effusion.    Mediastinum/hilum: No lymphadenopathy.    Lungs: Well aerated bilaterally with stable emphysematous changes and without mass, consolidation, or pleural effusions.    Liver: Postsurgical changes status post liver transplantation.  Liver is normal in size and attenuation with a stable subcentimeter hypodensity within segment VII too small to definitively characterize but without arterial enhancement or washout.  No enhancing mass lesions.    Gallbladder: Surgically absent.    Bile Ducts: Persistent intra and extrahepatic biliary ductal dilatation with common bile duct measuring approximately 1.3 cm.  No obvious etiology other than  status post cholecystectomy.    Pancreas: No mass or peripancreatic fat stranding.    Spleen: Mild splenomegaly with associated, residual collateralized vasculature.    Adrenals: Unremarkable.    Kidneys: Normal in size and location. Normal concentration and excretion of contrast. No hydronephrosis or nephrolithiasis.  Stable left renal cyst and right renal hypodensity too small to characterize but likely representing additional cysts.  Punctate calcification within the left renal collecting system likely reflecting nonobstructing nephrolith.    GI Tract/Mesentery: No evidence of bowel obstruction or inflammation. Large burden of stool throughout the colon suggestive for constipation.    Peritoneal Space: No ascites. No free air.    Retroperitoneum:  Multiple normal sized periaortic lymph nodes with no significant adenopathy.    Abdominal wall:  Unremarkable.    Vasculature: Mild calcific atherosclerosis of the infrarenal abdominal aorta with extension into the proximal iliac arteries.  No aneurysm.  Portal vein remains patent.  Hepatic artery appears to arise directly from the infrarenal aorta.    Bones: Mild degenerative changes without acute fracture or destructive osseous process.      Impression       Postsurgical changes status post liver transplantation for hepatocellular carcinoma and cirrhosis.  Stable subcentimeter hypodensity within hepatic segment VII, too small to fully characterize but without enhancement or subsequent washout.  No new lesions.    Persistent intra and extrahepatic biliary ductal dilatation in this patient status post cholecystectomy.    Additional, stable findings as above.       Assessment/Plan:   1. S/P liver transplant due to: cirrhosis 2/2 HCV/alcohol and HCC   -- Normal LFTs. Continue monitoring symptoms, labs, and drug levels for drug-related toxicity and side effects.  -- Continue with post transplant labs per protocol (every 12 weeks)  -- Encouraged complete abstinence from  alcohol    2. Prophylactic immunotherapy  -- Current IS: tacrolimus 1 mg BID and  mg BID. Most recent prograf level = 4.8  -- Remains on MMF due to liver biopsy in 9/2016 showing chronic hepatitis and some plasma cells, ? chronic AMR)   -- Chronic immunosuppressive medications for rejection prophylaxis at target. No adjustment needed at this time.     3. History of HCC  -- Stage III  -- No evidence of recurrence  -- Continue imaging and AFP per protocol, next CT due 4/2019     4. History of hepatitis C  -- S/p treatment with Harvoni and SVR    5. Hepatitis B infection in donor liver  -- on Truvada for prophylaxis   -- HBV serologies remain negative, continue labs per protocol     6. Osteopenia  -- DXA done in 4/2018, due for repeat 4/2020  -- Start Ca/Vitamin D supplement    7. Decreased libido  -- Referral to urology, appointment scheduled      Transplant Health Maintenance:  -- Instructed to f/u with PCP for regular health maintenance care including cancer (colonoscopy) and bone mineral density screenings   -- Reviewed need to avoid sun exposure with use of sunblock, hats, long sleeves related to increased risk of skin cancers. Recommend f/u with Dermatology for annual skin checks    Return to clinic in 1 year        Amy Valle NP    Hepatology and Liver Transplant        UNOS Patient Status  Functional Status: 100% - Normal, no complaints, no evidence of disease  Physical Capacity: No Limitations   Osmar Elaine DO

## 2025-06-27 RX ORDER — SIROLIMUS 1 MG/1
1 TABLET, FILM COATED ORAL DAILY
Qty: 30 TABLET | Refills: 11 | Status: SHIPPED | OUTPATIENT
Start: 2025-06-27 | End: 2026-06-27

## 2025-06-29 ENCOUNTER — PATIENT MESSAGE (OUTPATIENT)
Dept: CARDIOLOGY | Facility: CLINIC | Age: 70
End: 2025-06-29
Payer: MEDICARE

## 2025-07-04 DIAGNOSIS — Z20.6 CONTACT WITH AND (SUSPECTED) EXPOSURE TO HUMAN IMMUNODEFICIENCY VIRUS (HIV): ICD-10-CM

## 2025-07-07 ENCOUNTER — HOSPITAL ENCOUNTER (OUTPATIENT)
Dept: RADIOLOGY | Facility: HOSPITAL | Age: 70
Discharge: HOME | End: 2025-07-07
Attending: INTERNAL MEDICINE
Payer: MEDICARE

## 2025-07-07 DIAGNOSIS — C34.91 PRIMARY LUNG ADENOCARCINOMA, RIGHT: ICD-10-CM

## 2025-07-07 PROCEDURE — A9698 NON-RAD CONTRAST MATERIALNOC: HCPCS | Mod: PO | Performed by: INTERNAL MEDICINE

## 2025-07-07 PROCEDURE — 71260 CT THORAX DX C+: CPT | Mod: 26,,, | Performed by: RADIOLOGY

## 2025-07-07 PROCEDURE — 74177 CT ABD & PELVIS W/CONTRAST: CPT | Mod: 26,,, | Performed by: RADIOLOGY

## 2025-07-07 PROCEDURE — 25500020 PHARM REV CODE 255: Mod: PO | Performed by: INTERNAL MEDICINE

## 2025-07-07 PROCEDURE — 74177 CT ABD & PELVIS W/CONTRAST: CPT | Mod: TC,PO

## 2025-07-07 RX ADMIN — IOHEXOL 75 ML: 350 INJECTION, SOLUTION INTRAVENOUS at 11:07

## 2025-07-07 RX ADMIN — IOHEXOL 1000 ML: 9 SOLUTION ORAL at 11:07

## 2025-07-09 ENCOUNTER — LAB VISIT (OUTPATIENT)
Dept: LAB | Facility: HOSPITAL | Age: 70
End: 2025-07-09
Attending: INTERNAL MEDICINE

## 2025-07-09 DIAGNOSIS — Z85.05 PERSONAL HISTORY OF LIVER CANCER: ICD-10-CM

## 2025-07-09 DIAGNOSIS — C22.0 HCC (HEPATOCELLULAR CARCINOMA): ICD-10-CM

## 2025-07-09 DIAGNOSIS — C34.91 ADENOCARCINOMA OF RIGHT LUNG: ICD-10-CM

## 2025-07-09 DIAGNOSIS — Z00.5 HEPATITIS B VIRUS INFECTION IN CADAVERIC DONOR: ICD-10-CM

## 2025-07-09 DIAGNOSIS — Z94.4 LIVER REPLACED BY TRANSPLANT: ICD-10-CM

## 2025-07-09 DIAGNOSIS — B19.10 HEPATITIS B VIRUS INFECTION IN CADAVERIC DONOR: ICD-10-CM

## 2025-07-09 LAB
ABSOLUTE EOSINOPHIL (OHS): 0.24 K/UL
ABSOLUTE MONOCYTE (OHS): 0.51 K/UL (ref 0.3–1)
ABSOLUTE NEUTROPHIL COUNT (OHS): 2.76 K/UL (ref 1.8–7.7)
AFP SERPL-MCNC: <2 NG/ML
ALBUMIN SERPL BCP-MCNC: 3.9 G/DL (ref 3.5–5.2)
ALP SERPL-CCNC: 79 UNIT/L (ref 40–150)
ALT SERPL W/O P-5'-P-CCNC: 19 UNIT/L (ref 10–44)
ANION GAP (OHS): 4 MMOL/L (ref 8–16)
AST SERPL-CCNC: 29 UNIT/L (ref 11–45)
BASOPHILS # BLD AUTO: 0.02 K/UL
BASOPHILS NFR BLD AUTO: 0.4 %
BILIRUB SERPL-MCNC: 0.5 MG/DL (ref 0.1–1)
BUN SERPL-MCNC: 25 MG/DL (ref 8–23)
CALCIUM SERPL-MCNC: 8.8 MG/DL (ref 8.7–10.5)
CHLORIDE SERPL-SCNC: 103 MMOL/L (ref 95–110)
CO2 SERPL-SCNC: 29 MMOL/L (ref 23–29)
CREAT SERPL-MCNC: 1 MG/DL (ref 0.5–1.4)
ERYTHROCYTE [DISTWIDTH] IN BLOOD BY AUTOMATED COUNT: 13.9 % (ref 11.5–14.5)
GFR SERPLBLD CREATININE-BSD FMLA CKD-EPI: >60 ML/MIN/1.73/M2
GLUCOSE SERPL-MCNC: 92 MG/DL (ref 70–110)
HBV SURFACE AG SERPL QL IA: NORMAL
HCT VFR BLD AUTO: 33.3 % (ref 40–54)
HGB BLD-MCNC: 10.7 GM/DL (ref 14–18)
IMM GRANULOCYTES # BLD AUTO: 0.04 K/UL (ref 0–0.04)
IMM GRANULOCYTES NFR BLD AUTO: 0.8 % (ref 0–0.5)
INR PPP: 1.1 (ref 0.8–1.2)
LYMPHOCYTES # BLD AUTO: 1.15 K/UL (ref 1–4.8)
MAGNESIUM SERPL-MCNC: 2.2 MG/DL (ref 1.6–2.6)
MCH RBC QN AUTO: 32 PG (ref 27–31)
MCHC RBC AUTO-ENTMCNC: 32.1 G/DL (ref 32–36)
MCV RBC AUTO: 100 FL (ref 82–98)
NUCLEATED RBC (/100WBC) (OHS): 0 /100 WBC
PHOSPHATE SERPL-MCNC: 3.1 MG/DL (ref 2.7–4.5)
PLATELET # BLD AUTO: 130 K/UL (ref 150–450)
PMV BLD AUTO: 11.4 FL (ref 9.2–12.9)
POTASSIUM SERPL-SCNC: 4.4 MMOL/L (ref 3.5–5.1)
PROT SERPL-MCNC: 7.2 GM/DL (ref 6–8.4)
PROTHROMBIN TIME: 11.6 SECONDS (ref 9–12.5)
RBC # BLD AUTO: 3.34 M/UL (ref 4.6–6.2)
RELATIVE EOSINOPHIL (OHS): 5.1 %
RELATIVE LYMPHOCYTE (OHS): 24.4 % (ref 18–48)
RELATIVE MONOCYTE (OHS): 10.8 % (ref 4–15)
RELATIVE NEUTROPHIL (OHS): 58.5 % (ref 38–73)
SODIUM SERPL-SCNC: 136 MMOL/L (ref 136–145)
WBC # BLD AUTO: 4.72 K/UL (ref 3.9–12.7)

## 2025-07-09 PROCEDURE — 80053 COMPREHEN METABOLIC PANEL: CPT

## 2025-07-09 PROCEDURE — 82105 ALPHA-FETOPROTEIN SERUM: CPT

## 2025-07-09 PROCEDURE — 87517 HEPATITIS B DNA QUANT: CPT

## 2025-07-09 PROCEDURE — 84100 ASSAY OF PHOSPHORUS: CPT

## 2025-07-09 PROCEDURE — 80195 ASSAY OF SIROLIMUS: CPT

## 2025-07-09 PROCEDURE — 85610 PROTHROMBIN TIME: CPT

## 2025-07-09 PROCEDURE — 36415 COLL VENOUS BLD VENIPUNCTURE: CPT | Mod: PO

## 2025-07-09 PROCEDURE — 85025 COMPLETE CBC W/AUTO DIFF WBC: CPT

## 2025-07-09 PROCEDURE — 87340 HEPATITIS B SURFACE AG IA: CPT

## 2025-07-09 PROCEDURE — 83735 ASSAY OF MAGNESIUM: CPT

## 2025-07-09 RX ORDER — EMTRICITABINE AND TENOFOVIR DISOPROXIL FUMARATE 200; 300 MG/1; MG/1
1 TABLET, FILM COATED ORAL DAILY
Qty: 30 TABLET | Refills: 11 | Status: SHIPPED | OUTPATIENT
Start: 2025-07-09

## 2025-07-10 ENCOUNTER — OFFICE VISIT (OUTPATIENT)
Dept: HEMATOLOGY/ONCOLOGY | Facility: CLINIC | Age: 70
End: 2025-07-10
Attending: INTERNAL MEDICINE
Payer: MEDICARE

## 2025-07-10 ENCOUNTER — HOSPITAL ENCOUNTER (OUTPATIENT)
Dept: RADIOLOGY | Facility: HOSPITAL | Age: 70
Discharge: HOME | End: 2025-07-10
Attending: STUDENT IN AN ORGANIZED HEALTH CARE EDUCATION/TRAINING PROGRAM
Payer: MEDICARE

## 2025-07-10 VITALS
SYSTOLIC BLOOD PRESSURE: 157 MMHG | RESPIRATION RATE: 16 BRPM | TEMPERATURE: 99 F | HEART RATE: 63 BPM | WEIGHT: 119.69 LBS | HEIGHT: 66 IN | BODY MASS INDEX: 19.24 KG/M2 | DIASTOLIC BLOOD PRESSURE: 83 MMHG | OXYGEN SATURATION: 100 %

## 2025-07-10 DIAGNOSIS — C34.91 PRIMARY LUNG ADENOCARCINOMA, RIGHT: Primary | ICD-10-CM

## 2025-07-10 DIAGNOSIS — Z94.4 LIVER REPLACED BY TRANSPLANT: ICD-10-CM

## 2025-07-10 DIAGNOSIS — R31.0 GROSS HEMATURIA: ICD-10-CM

## 2025-07-10 LAB
HBV DNA SERPL NAA+PROBE-ACNC: NORMAL [IU]/ML
SIROLIMUS BLD-MCNC: 3.9 NG/ML (ref 4–20)

## 2025-07-10 PROCEDURE — 3288F FALL RISK ASSESSMENT DOCD: CPT | Mod: CPTII,S$GLB,, | Performed by: INTERNAL MEDICINE

## 2025-07-10 PROCEDURE — 3079F DIAST BP 80-89 MM HG: CPT | Mod: CPTII,S$GLB,, | Performed by: INTERNAL MEDICINE

## 2025-07-10 PROCEDURE — 25500020 PHARM REV CODE 255: Mod: PO

## 2025-07-10 PROCEDURE — 4010F ACE/ARB THERAPY RXD/TAKEN: CPT | Mod: CPTII,S$GLB,, | Performed by: INTERNAL MEDICINE

## 2025-07-10 PROCEDURE — 1126F AMNT PAIN NOTED NONE PRSNT: CPT | Mod: CPTII,S$GLB,, | Performed by: INTERNAL MEDICINE

## 2025-07-10 PROCEDURE — 1160F RVW MEDS BY RX/DR IN RCRD: CPT | Mod: CPTII,S$GLB,, | Performed by: INTERNAL MEDICINE

## 2025-07-10 PROCEDURE — 1159F MED LIST DOCD IN RCRD: CPT | Mod: CPTII,S$GLB,, | Performed by: INTERNAL MEDICINE

## 2025-07-10 PROCEDURE — 74178 CT ABD&PLV WO CNTR FLWD CNTR: CPT | Mod: 26,,, | Performed by: STUDENT IN AN ORGANIZED HEALTH CARE EDUCATION/TRAINING PROGRAM

## 2025-07-10 PROCEDURE — 3077F SYST BP >= 140 MM HG: CPT | Mod: CPTII,S$GLB,, | Performed by: INTERNAL MEDICINE

## 2025-07-10 PROCEDURE — G2211 COMPLEX E/M VISIT ADD ON: HCPCS | Mod: S$GLB,,, | Performed by: INTERNAL MEDICINE

## 2025-07-10 PROCEDURE — 99214 OFFICE O/P EST MOD 30 MIN: CPT | Mod: S$GLB,,, | Performed by: INTERNAL MEDICINE

## 2025-07-10 PROCEDURE — 1101F PT FALLS ASSESS-DOCD LE1/YR: CPT | Mod: CPTII,S$GLB,, | Performed by: INTERNAL MEDICINE

## 2025-07-10 PROCEDURE — 99999 PR PBB SHADOW E&M-EST. PATIENT-LVL IV: CPT | Mod: PBBFAC,,, | Performed by: INTERNAL MEDICINE

## 2025-07-10 PROCEDURE — 1157F ADVNC CARE PLAN IN RCRD: CPT | Mod: CPTII,S$GLB,, | Performed by: INTERNAL MEDICINE

## 2025-07-10 PROCEDURE — 3008F BODY MASS INDEX DOCD: CPT | Mod: CPTII,S$GLB,, | Performed by: INTERNAL MEDICINE

## 2025-07-10 PROCEDURE — 74178 CT ABD&PLV WO CNTR FLWD CNTR: CPT | Mod: TC,PO

## 2025-07-10 RX ADMIN — IOHEXOL 125 ML: 350 INJECTION, SOLUTION INTRAVENOUS at 01:07

## 2025-07-10 NOTE — PROGRESS NOTES
Subjective     Patient ID: Mario Grier is a 70 y.o. male.    Chief Complaint: Follow-up (4mths FU with Labs CT/Primary lung adenocarcinoma, right)  ONCOLOGIC HISTORY Mr. Grier is a 70 y.o. male smoker s/p OLTX 2013 (HCC), hyperthyroidism, BPH, and hx substance abuse with a new diagnosis of  RUL NSCLC.   Yearly CAP CT per transplant showed right upper lobe cavitary lesion.      PET 1/29/2024 2024: 2.2 cm spiculated, hypermetabolic cavitary right upper lobe pulmonary nodule, highly suspicious for malignancy. No evidence of FDG avid metastatic disease. Bilateral renal calculi and other incidental findings as above.     Percutaneous biopsy on 02/28/2024 positive for adenocarcinoma, lepidic type     On 04/22/2024 he underwent right upper robotic lobectomy.     Pathology reveals: Tumor Focality: Single focus  Tumor Site : upper lobe of lung:  Total Tumor Size :  4.2 cm  Histologic Type:  Invasive acinar adenocarcinoma  Histologic Grade :  G3, poorly differentiated  Visceral Pleura Invasion  :  Not identified  Direct Invasion of Adjacent Structures :   Not applicable (no adjacent structures present)  Treatment Effect :   No known presurgical therapy  Lymphovascular Invasion :   Present (not otherwise specified)  Margin Status for Invasive Carcinoma :  All margins negative for invasive carcinoma ; 3.5 cm to the vascular margin  Lymph Node(s) from Prior Procedures  : left 11, left 10, and 7--no carcinoma identified;  Included, but not in count  Regional Lymph Node Status :  All regional lymph nodes negative for tumor  Number of Lymph Nodes with Tumor : 0  Number of Lymph Nodes Examined  : likely 12 in this procedure  Young Site(s) Examined :  Right level 8, level 7, level 2, level 4, level 11, and level 12  Distant Site(s) Involved, if applicable  none known  PATHOLOGIC STAGE CLASSIFICATION (pTNM, AJCC 8th Edition):  pT2b pN0 pMX  Additional Findings:  There is a focal area of considerable emphysema with cavity formation  "     Case reviewed in thoracic multidisciplinary tumor board on 05/08/2024 and recommendations include "Referral to Med/Onc for discussion of adjuvant systemic therapy for Stage IIB disease"        He started adjuvant chemo with Cisplatin and ALimta on 6/10/2024 and completed 4 cycles as of 8/13/2024            His CT scans from 10/7/2024 revealed "New right lower lobe 15 mm ground-glass and small right pleural effusion, possibly infectious/inflammatory, neoplastic, or posttraumatic in the setting of a healing posterior right 8th rib fracture.  Recommend follow-up as per oncologic protocol. Intra and extrahepatic biliary ductal dilatation, similar to priors"           His CT CAP on 1/17/2025 T"here is been interval improvement in aeration at the right lung base since 10/07/2024 without worrisome detrimental change.  Several small pulmonary nodules are noted the largest of 4 mm new since 03/15/2024 but stable since 10/07/2024.  Continued longer term follow-up for size stability would be suggested.  2. Decreased liver density as can be seen with fatty infiltration of the liver  3. Prominent common bile duct and intrahepatic biliary ducts, please correlate for a distal biliary obstructive type process.  This appears dilated to the level of the ampulla.  Direct visualization of the duodenal ampulla may be of use but biliary dilatation appears to have been present since at least 12/11/2023  4. Heterogeneous prostate, correlation with PSA may be of use.  5. Renal hypodensities bilaterally statistically favored relate to cysts several too small to accurately categorize  6. Nonobstructive left renal stone  7. Fusion of the SI joints as can be seen with sacroiliitis or degenerative change  8. Degenerative changes in the spine with central canal narrowing at the L4-L5 level suspected.  If necessary dedicated imaging could be performed"    He is on surveillance    HPIHe comes in to review his CT CAP 7/7/2025 "There is a new 4 " "mm pulmonary nodule left lung base.  Other pulmonary nodules are not significantly changed.  Suggest continued follow-up with attention to the new 4 mm nodule. Small 4-5 mm low-density foci in the liver although not fully characterize are not significantly changed compared to prior exams. Continued biliary duct dilatation not appearing significantly changed Nonspecific mild dilatation of distal small bowel loops.  There is large amount of stool within colon particularly right colon and this may be secondary to stasis and stool.  No apparent wall thickening or surrounding inflammatory change"      Review of Systems   Constitutional:  Negative for appetite change, fatigue and unexpected weight change.   HENT:  Negative for mouth sores.    Eyes:  Negative for visual disturbance.   Respiratory:  Negative for cough and shortness of breath.    Cardiovascular:  Negative for chest pain.   Gastrointestinal:  Negative for abdominal pain and diarrhea.   Genitourinary:  Negative for frequency.   Musculoskeletal:  Negative for back pain.   Integumentary:  Negative for rash.   Neurological:  Negative for headaches.   Hematological:  Negative for adenopathy.   Psychiatric/Behavioral:  The patient is not nervous/anxious.    All other systems reviewed and are negative.         Objective     Physical Exam         LABS:  WBC   Date Value Ref Range Status   07/09/2025 4.72 3.90 - 12.70 K/uL Final     Hemoglobin   Date Value Ref Range Status   03/13/2025 12.3 (L) 14.0 - 18.0 g/dL Final   03/13/2025 12.3 (L) 14.0 - 18.0 g/dL Final     HGB   Date Value Ref Range Status   07/09/2025 10.7 (L) 14.0 - 18.0 gm/dL Final     POC Hematocrit   Date Value Ref Range Status   07/11/2013 32 (L) 36 - 54 %PCV Final     Hematocrit   Date Value Ref Range Status   03/13/2025 35.7 (L) 40.0 - 54.0 % Final   03/13/2025 35.7 (L) 40.0 - 54.0 % Final     HCT   Date Value Ref Range Status   07/09/2025 33.3 (L) 40.0 - 54.0 % Final     Platelet Count   Date Value " Ref Range Status   07/09/2025 130 (L) 150 - 450 K/uL Final     Platelets   Date Value Ref Range Status   03/13/2025 130 (L) 150 - 450 K/uL Final   03/13/2025 130 (L) 150 - 450 K/uL Final     Gran # (ANC)   Date Value Ref Range Status   03/13/2025 3.7 1.8 - 7.7 K/uL Final   03/13/2025 3.7 1.8 - 7.7 K/uL Final     Gran %   Date Value Ref Range Status   03/13/2025 71.8 38.0 - 73.0 % Final   03/13/2025 71.8 38.0 - 73.0 % Final       Chemistry        Component Value Date/Time     07/09/2025 0743     (L) 06/02/2025 1359     03/13/2025 1539     03/13/2025 1539    K 4.4 07/09/2025 0743    K 4.8 06/02/2025 1359    K 5.5 (H) 03/13/2025 1539    K 5.5 (H) 03/13/2025 1539     07/09/2025 0743     03/13/2025 1539     03/13/2025 1539    CO2 29 07/09/2025 0743    CO2 24 06/02/2025 1359    CO2 24 03/13/2025 1539    CO2 24 03/13/2025 1539    BUN 25 (H) 07/09/2025 0743    CREATININE 1.0 07/09/2025 0743    GLU 92 07/09/2025 0743     (H) 03/13/2025 1539     (H) 03/13/2025 1539        Component Value Date/Time    CALCIUM 8.8 07/09/2025 0743    CALCIUM 8.7 (L) 06/02/2025 1359    CALCIUM 8.9 03/13/2025 1539    CALCIUM 8.9 03/13/2025 1539    ALKPHOS 79 07/09/2025 0743    ALKPHOS 81 06/02/2025 1359    ALKPHOS 96 03/13/2025 1539    ALKPHOS 96 03/13/2025 1539    AST 29 07/09/2025 0743    AST 31 06/02/2025 1359    AST 24 03/13/2025 1539    AST 24 03/13/2025 1539    ALT 19 07/09/2025 0743    ALT 21 06/02/2025 1359    ALT 24 03/13/2025 1539    ALT 24 03/13/2025 1539    BILITOT 0.5 07/09/2025 0743    BILITOT 0.5 06/02/2025 1359    BILITOT 0.5 03/13/2025 1539    BILITOT 0.5 03/13/2025 1539    ESTGFRAFRICA >60.0 05/31/2022 0918    EGFRNONAA 56.9 (A) 05/31/2022 0918          Assessment and Plan     1. Primary lung adenocarcinoma, right  -     CT Chest Abdomen Pelvis With IV Contrast (XPD) Routine Oral Contrast; Future; Expected date: 07/10/2025  -     CBC w/ DIFF; Future; Expected date:  07/10/2025  -     CMP; Future; Expected date: 07/10/2025    2. Liver replaced by transplant  Overview:  HCV stage 2 grade 1        Route Chart for Scheduling    Med Onc Chart Routing      Follow up with physician 4 months. Schedule CBc, CMP, CT chest, abdomen/pelvis and see me   Follow up with NICOLE    Infusion scheduling note    Injection scheduling note    Labs    Imaging    Pharmacy appointment    Other referrals                 Mr. Grier is doing very well clinically with no evidence of recurrence on his scans, he will return in 4 months with repeat labs and CT scans    Liver replaced by transplant he continues to follow-up with transplant hepatology     code applied: patient requires or will require a continuous, longitudinal, and active collaborative plan of care related to this patient's health condition, history of lung cancer the management of which requires the direction of a practitioner with specialized clinical knowledge, skill, and expertise.      Above care plan was discussed with patient and all questions were addressed to his satisfaction

## 2025-07-11 ENCOUNTER — DOCUMENTATION ONLY (OUTPATIENT)
Dept: HEMATOLOGY/ONCOLOGY | Facility: CLINIC | Age: 70
End: 2025-07-11
Payer: MEDICARE

## 2025-07-15 ENCOUNTER — OFFICE VISIT (OUTPATIENT)
Dept: TRANSPLANT | Facility: CLINIC | Age: 70
End: 2025-07-15
Attending: INTERNAL MEDICINE
Payer: MEDICARE

## 2025-07-15 ENCOUNTER — PATIENT MESSAGE (OUTPATIENT)
Dept: TRANSPLANT | Facility: CLINIC | Age: 70
End: 2025-07-15
Payer: MEDICARE

## 2025-07-15 DIAGNOSIS — Z29.89 PROPHYLACTIC IMMUNOTHERAPY: Primary | Chronic | ICD-10-CM

## 2025-07-15 DIAGNOSIS — Z94.4 LIVER REPLACED BY TRANSPLANT: ICD-10-CM

## 2025-07-15 NOTE — PROGRESS NOTES
The patient location is: home ([place])  The chief complaint leading to consultation is: post-transplant follow up    Visit type: audiovisual    Face to Face time with patient: 4 minutes  25 minutes of total time spent on the encounter, which includes face to face time and non-face to face time preparing to see the patient (eg, review of tests), Obtaining and/or reviewing separately obtained history, Documenting clinical information in the electronic or other health record, Independently interpreting results (not separately reported) and communicating results to the patient/family/caregiver, or Care coordination (not separately reported).         Each patient to whom he or she provides medical services by telemedicine is:  (1) informed of the relationship between the physician and patient and the respective role of any other health care provider with respect to management of the patient; and (2) notified that he or she may decline to receive medical services by telemedicine and may withdraw from such care at any time.    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  Posttransplant he developed recurrent hepatitis C with stage III fibrosis.  He was treated with antiviral therapy and has had a sustained virological response with undetectable HCV RNA.  Because he has advanced fibrosis he has been enrolled in a hepatoma surveillance program.  He is now being followed by cross-sectional imaging because of a history of lung cancer.  He is also being evaluated by Urology for gross hematuria.  His allograft function is stable on sirolimus 1 mg daily.  I have made no changes to his current medications.  He will return to clinic in 1 year's time.

## 2025-07-15 NOTE — LETTER
July 15, 2025        Neto Roblero  [address] [facility] [place] [address]  Phone: [phone]  Fax: [phone]             [facility] [address]  [address]  [facility] [address]  Phone: [phone]   Patient: Mario Grier   MR Number: [id number]   YOB: 1955   Date of Visit: 7/15/2025       Dear Dr. Neto Roblero    Thank you for referring Mario Grier to me for evaluation. Attached you will find relevant portions of my assessment and plan of care.    If you have questions, please do not hesitate to call me. I look forward to following Mario Grier along with you.    Sincerely,    Jim Ball MD    Enclosure    If you would like to receive this communication electronically, please contact [email or web address] or [phone] to request [employer] access.    [employer] is a tool which provides read-only access to select patient information with whom you have a relationship. Its easy to use and provides real time access to review your patients record including encounter summaries, notes, results, and demographic information.    If you feel you have received this communication in error or would no longer like to receive these types of communications, please e-mail [email or web address]

## 2025-07-28 ENCOUNTER — PATIENT MESSAGE (OUTPATIENT)
Dept: HEMATOLOGY/ONCOLOGY | Facility: CLINIC | Age: 70
End: 2025-07-28
Payer: MEDICARE

## 2025-09-04 ENCOUNTER — TELEPHONE (OUTPATIENT)
Dept: CARDIOLOGY | Facility: CLINIC | Age: 70
End: 2025-09-04
Payer: MEDICARE

## (undated) DEVICE — DRESSING TRANS 4X4 TEGADERM

## (undated) DEVICE — CANNULA REDUCER 12-8MM

## (undated) DEVICE — RELOAD SUREFORM 45 4.6 BLK 6R

## (undated) DEVICE — TUBING SUC UNIV W/CONN 12FT

## (undated) DEVICE — ELECTRODE REM PLYHSV RETURN 9

## (undated) DEVICE — GLOVE BIOGEL SKINSENSE PI 7.5

## (undated) DEVICE — SUT 2-0 VICRYL / CT-1

## (undated) DEVICE — TRAY MINOR GEN SURG OMC

## (undated) DEVICE — DRAPE COLUMN DAVINCI XI

## (undated) DEVICE — SUT SILK 0 BLK BR FSL 18 IN

## (undated) DEVICE — DRAPE SCOPE PILLOW WARMER

## (undated) DEVICE — SUT 0 VICRYL / CT-1

## (undated) DEVICE — NDL SPINAL 18GX3.5 SPINOCAN

## (undated) DEVICE — SUT VICRYL 3-0 27 SH

## (undated) DEVICE — PORT AIRSEAL 12/120MM LPI

## (undated) DEVICE — CANNULA SEAL 12MM

## (undated) DEVICE — SUT SILK 0 STRANDS 30IN BLK

## (undated) DEVICE — RELOAD SUREFORM 45 3.5 BLU 6R

## (undated) DEVICE — CONTAINER SPECIMEN OR STER 4OZ

## (undated) DEVICE — DRAIN CHEST DRY SUCTION

## (undated) DEVICE — DRAPE ARM DAVINCI XI

## (undated) DEVICE — DRESSING TRANS 2X2 TEGADERM

## (undated) DEVICE — SYR SLIP TIP 20CC

## (undated) DEVICE — DRAPE INCISE IOBAN 2 23X17IN

## (undated) DEVICE — SET TRI-LUMEN FILTERED TUBE

## (undated) DEVICE — SUT MCRYL PLUS 4-0 PS2 27IN

## (undated) DEVICE — GOWN SMART IMP BREATHABLE XXLG

## (undated) DEVICE — TAPE SILK 3IN

## (undated) DEVICE — DRAPE ABDOMINAL TIBURON 14X11

## (undated) DEVICE — COVER LIGHT HANDLE

## (undated) DEVICE — SOL WATER STRL IRR 1000ML

## (undated) DEVICE — RELOAD SUREFORM 45 2.5 WHT 6R

## (undated) DEVICE — SYR ONLY LUER LOCK 20CC

## (undated) DEVICE — DRESSING SURGICAL 1X3

## (undated) DEVICE — RELOAD SUREFORM 45 4.3 GRN 6R

## (undated) DEVICE — POWDER ARISTA AH 3G

## (undated) DEVICE — ELECTRODE BLADE INSULATED 1 IN

## (undated) DEVICE — APPLICATOR SURGICAL ARISTA XL

## (undated) DEVICE — KIT ANTIFOG W/SPONG & FLUID

## (undated) DEVICE — STAPLER SUREFORM CRVD TIP 45

## (undated) DEVICE — SEAL UNIVERSAL 5MM-8MM XI

## (undated) DEVICE — REINFORCEMENT STAPLE LINE 60MM

## (undated) DEVICE — HEMOSTAT SURGICEL NUKNIT 3X4IN